# Patient Record
Sex: MALE | Race: WHITE | Employment: OTHER | URBAN - METROPOLITAN AREA
[De-identification: names, ages, dates, MRNs, and addresses within clinical notes are randomized per-mention and may not be internally consistent; named-entity substitution may affect disease eponyms.]

---

## 2017-04-11 ENCOUNTER — HOSPITAL ENCOUNTER (OUTPATIENT)
Dept: NON INVASIVE DIAGNOSTICS | Age: 82
Discharge: HOME | End: 2017-04-11
Attending: INTERNAL MEDICINE
Payer: MEDICARE

## 2017-04-11 DIAGNOSIS — R01.1 HEART MURMUR, SYSTOLIC: ICD-10-CM

## 2017-04-11 PROCEDURE — 93306 TTE W/DOPPLER COMPLETE: CPT

## 2017-04-12 NOTE — PROGRESS NOTES
Echo report reviewed and shows moderate Aortic stenosis which is the cause of his murmur  It is not bad enough to require replacement at this time but I recommend cardiology consult to follow him closely.  Please inform the patient

## 2017-04-21 PROBLEM — I35.0 AORTIC STENOSIS, MODERATE: Status: ACTIVE | Noted: 2017-04-21

## 2017-04-21 PROBLEM — Z87.39 HISTORY OF GOUT: Status: ACTIVE | Noted: 2017-04-21

## 2017-07-28 PROBLEM — Z87.39 HISTORY OF GOUT: Status: RESOLVED | Noted: 2017-04-21 | Resolved: 2017-07-28

## 2018-02-08 ENCOUNTER — HOSPITAL ENCOUNTER (OUTPATIENT)
Dept: PHYSICAL THERAPY | Age: 83
Discharge: HOME | End: 2018-02-08
Attending: INTERNAL MEDICINE
Payer: MEDICARE

## 2018-02-08 NOTE — PROGRESS NOTES
Nelly Lennox  : 1932  Primary: [employer] Medicare o  Secondary:  [address]  [facility] [address], [place] [address]  [phone]   [phone]        OUTPATIENT DAILY NOTE    NAME/AGE/GENDER: Nelly Lennox is a 80 y.o. male. DATE: 2018    Patient canceled physical therapy initial evaluation today.     Alayna Nguyễn, PT

## 2018-02-12 ENCOUNTER — HOSPITAL ENCOUNTER (OUTPATIENT)
Dept: PHYSICAL THERAPY | Age: 83
Discharge: HOME | End: 2018-02-12
Attending: INTERNAL MEDICINE
Payer: MEDICARE

## 2018-02-12 PROCEDURE — 97161 PT EVAL LOW COMPLEX 20 MIN: CPT

## 2018-02-12 PROCEDURE — G8979 MOBILITY GOAL STATUS: HCPCS

## 2018-02-12 PROCEDURE — G8978 MOBILITY CURRENT STATUS: HCPCS

## 2018-02-12 PROCEDURE — 97110 THERAPEUTIC EXERCISES: CPT

## 2018-02-12 NOTE — THERAPY EVALUATION
Mary Kwok  : 1932 [facility]  [address], [address], [place] [address]  [phone]   [phone]        OUTPATIENT PHYSICAL THERAPY:Initial Assessment 2018   ICD-10: Treatment Diagnosis: Low back pain (M54.5)  Precautions/Allergies:   Neurontin [gabapentin]   Fall Risk Score: 1 (? 5 = High Risk)  MD Orders: evaluate and treat MEDICAL/REFERRING DIAGNOSIS:  Chronic left-sided low back pain without sciatica [M54.5, G89.29]   DATE OF ONSET: --  REFERRING PHYSICIAN: Sheridan David MD  RETURN PHYSICIAN APPOINTMENT: --     INITIAL ASSESSMENT:  Mr. Joie Denise presents with left side low back pain with strength, ROM and postural abnormalities. Pt will benefit from skilled physical therapy to address dysfunctions and pain. PROBLEM LIST (Impacting functional limitations):  1. Decreased ADL/Functional Activities  2. Increased Pain  3. Decreased [place] with Home Exercise Program INTERVENTIONS PLANNED:  1. Home Exercise Program (HEP)  2. Manual Therapy including joint and soft tissue manipulation and mobilization, and dry needling  3. Therapeutic Exercise/Strengthening  4. Modalities including heat/cold application and electrical stimulation   TREATMENT PLAN:  Effective Dates: 18 TO 18. Frequency/Duration: 2 times a week for 4 weeks  GOALS: (Goals have been discussed and agreed upon with patient.)  Short-Term Functional Goals: Time Frame: 4 weeks  1. Pt will be independent with > or = 2 exercises in HEP. 2. Pt will report < or = 4/10 pain getting into and out of car. Discharge Goals: Time Frame: 8 weeks  1. Pt will be independent with > or = 4 exercises in HEP. 2. Pt will report < or = 2/10 pain getting into and out of car. 3. Pt will report < or = 10 on Oswestry.    Rehabilitation Potential For Stated Goals: Good  Regarding Daina Osler Wheless's therapy, I certify that the treatment plan above will be carried out by a therapist or under their direction. Thank you for this referral,  Joselyn Echols, PT, DPT                 The information in this section was collected on 2/12/18 (except where otherwise noted). 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 The back pain began Nov/Dec 2017. MD gave him a mm relaxor which helped some. Pain is worse getting in/out of car, sitting in car. Past Medical History/Comorbidities:   Mr. Dionicio Ma  has a past medical history of Cancer of prostate ([facility] [address] (2001); CKD (chronic kidney disease) stage 3, GFR 30-59 ml/min (8/18/2012); Gout (8/18/2012); Metabolic syndrome (1/09/5724); Mixed hyperlipidemia (8/18/2012); Moderate aortic valve stenosis; Peptic ulcer disease (8/18/2012); Unspecified essential hypertension (8/18/2012); and Urinary retention. He also has no past medical history of Difficult intubation; Malignant hyperthermia due to anesthesia; Nausea & vomiting; Pseudocholinesterase deficiency; or Unspecified adverse effect of anesthesia. Mr. Dionicio Ma  has a past surgical history that includes hx appendectomy (1950); hx cholecystectomy; hx gastrectomy; hx colonoscopy (2002); and hx turp (2000, 2015). Social History/Living Environment:     house  Prior Level of Function/Work/Activity:  Works  for dental lab 3 days per week  Dominant Side:         RIGHT  Current Medications:       Current Outpatient Prescriptions:     tiZANidine (ZANAFLEX) 4 mg tablet, Take 1 Tab by mouth three (3) times daily as needed. , Disp: 30 Tab, Rfl: 1    methylPREDNISolone (MEDROL DOSEPACK) 4 mg tablet, As directed, Disp: 1 Dose Pack, Rfl: 0    tamsulosin (FLOMAX) 0.4 mg capsule, Take 1 Cap by mouth daily (after dinner). , Disp: 90 Cap, Rfl: 1    benazepril (LOTENSIN) 40 mg tablet, Take 1 Tab by mouth daily. , Disp: 90 Tab, Rfl: 1    amLODIPine (NORVASC) 5 mg tablet, Take 1 Tab by mouth daily. , Disp: 90 Tab, Rfl: 1    allopurinol (ZYLOPRIM) 300 mg tablet, Take 1 Tab by mouth daily. , Disp: 90 Tab, Rfl: 1    SORE THROAT, BENZOCAINE-MENTH, 15-2.6 mg lozg lozenge, , Disp: , Rfl:     acetaminophen (TYLENOL EXTRA STRENGTH) 500 mg tablet, Take 1,000 mg by mouth nightly., Disp: , Rfl:     red yeast rice extract 600 mg cap, Take 600 mg by mouth daily. , Disp: , Rfl:    Date Last Reviewed:  2/12/2018     Number of Personal Factors/Comorbidities that affect the Plan of Care: 1-2: MODERATE COMPLEXITY   EXAMINATION:   Observation/Orthostatic Postural Assessment:          In standing forward trunk lean at the hips, increased hip flexion   Palpation:          Increased tenderness to PA glides to mid-lumbar segments and associated left low back pain  ROM:          See below  Strength:          Hip strength grossly 4/5 bilaterally        Decreased core strength  Balance:          No functional deficits noted  SFMA Top Tier (FN = Functional and Non-painful, FP = Functional and Painful, DP = Dysfunctional and Painful, DN = Dysfunctional and Non-painful)  Cervical flexion: FN  Cervical extension: DN   Cervical rotation: L FN, R DN  Upper Extremity Pattern 1 (extension, IR): L FN, R DN  Upper Extremity Pattern 2 (flexion, ER): L FN, R DN  Multi-segmental flexion: DN  Multi-segmental extension: DP  Multi-segmental rotation: L DP, R DP  Single-Leg Stance: L --, R --  Overhead Deep Squat: --   Summary: trunk extension and right rotation resulted in the most pain increases     Body Structures Involved:  1. Nerves  2. Joints  3. Muscles Body Functions Affected:  1. Sensory/Pain  2. Neuromusculoskeletal  3. Movement Related Activities and Participation Affected:  1. General Tasks and Demands  2. Mobility  3.  Community, Social and [place]   Number of elements (examined above) that affect the Plan of Care: 1-2: LOW COMPLEXITY   CLINICAL PRESENTATION:   Presentation: Stable and uncomplicated: LOW COMPLEXITY   CLINICAL DECISION MAKING:   Outcome Measure: Tool Used: Modified Oswestry Low Back Pain Questionnaire  Score:  Initial: 12/50  Most Recent: X/50 (Date: -- )   Interpretation of Score: Each section is scored on a 0-5 scale, 5 representing the greatest disability. The scores of each section are added together for a total score of 50. Score 0 1-10 11-20 21-30 31-40 41-49 50   Modifier CH CI CJ CK CL CM CN     ? Mobility - Walking and Moving Around:     - CURRENT STATUS: CJ - 20%-39% impaired, limited or restricted    - GOAL STATUS: CI - 1%-19% impaired, limited or restricted    - D/C STATUS:  ---------------To be determined---------------    Medical Necessity:   · Skilled intervention continues to be required due to decreased function. Reason for Services/Other Comments:  · Patient continues to require skilled intervention due to decreased function. Use of outcome tool(s) and clinical judgement create a POC that gives a: Clear prediction of patient's progress: LOW COMPLEXITY            TREATMENT:   (In addition to Assessment/Re-Assessment sessions the following treatments were rendered)  Pre-treatment Symptoms/Complaints:  3/10 pain  Pain: Initial:     3/10 Post Session:  1/10     THERAPEUTIC EXERCISE: (10 minutes):  Exercises per grid below to improve mobility and strength. Date:  2/12 Date:   Date:     Activity/Exercise Parameters Parameters Parameters         Prone press up X 10     Standing lumbar extension over fulcrum X 10                                 MANUAL THERAPY: ( 5 minutes): To increase motion and reduce pain  - cross friction massage over L lumbar paraspinal    MODALITIES: (10 minutes): for pain and tone reduction  Moist heat to low back    Treatment/Session Assessment:    · Response to Treatment:  Pt reported decreased pain after manual therapy plus extension exercises, increased pain during flexion activities.   · Compliance with Program/Exercises: Will assess as treatment progresses. · Recommendations/Intent for next treatment session: \"Next visit will focus on advancements to more challenging activities\".   Total Treatment Duration:  PT Patient Time In/Time Out  Time In: 1100  Time Out: 1200    Future Appointments  Date Time Provider Nishant Fisher   2/16/2018 1:00 PM Sloan Ferrer PT, DPT [facility]   2/19/2018 7:30 AM Gale Basilio PT [facility]   2/23/2018 1:45 PM Sloan Ferrer PT, DPT [facility]   2/26/2018 7:30 AM Gale Basilio PT [facility]   3/2/2018 1:00 PM Sloan Ferrer PT, DPT [facility]   3/5/2018 7:30 AM Gale Basilio PT [facility]   3/9/2018 1:00 PM Sloan Ferrer PT, DPT [facility]   3/12/2018 7:30 AM Gale Basilio PT [facility]   3/16/2018 1:00 PM Sloan Ferrer PT, DPT [facility]   3/27/2018 8:15 AM Kane Marc MD [facility]       Sloan Ferrer PT, DPT

## 2018-02-12 NOTE — PROGRESS NOTES
Ambulatory/Rehab Services H2 Model Falls Risk Assessment    Risk Factor Pts. ·   Confusion/Disorientation/Impulsivity  []    4 ·   Symptomatic Depression  []   2 ·   Altered Elimination  []   1 ·   Dizziness/Vertigo  []   1 ·   Gender (Male)  [x]   1 ·   Any administered antiepileptics (anticonvulsants):  []   2 ·   Any administered benzodiazepines:  []   1 ·   Visual Impairment (specify):  []   1 ·   Portable Oxygen Use  []   1 ·   Orthostatic ? BP  []   1 ·   History of Recent Falls (within 3 mos.)  []   5     Ability to Rise from Chair (choose one) Pts. ·   Ability to rise in a single movement  []   0 ·   Pushes up, successful in one attempt  []   1 ·   Multiple attempts, but successful  []   3 ·   Unable to rise without assistance  []   4   Total: (5 or greater = High Risk) 1     Falls Prevention Plan:   []                Physical Limitations to Exercise (specify):   []                Mobility Assistance Device (type):   []                Exercise/Equipment Adaptation (specify):    ©2010 [facility] [address] Patent #[id number].  Federal Law prohibits the replication, distribution or use without written permission from [facility] [employer]

## 2018-02-19 ENCOUNTER — HOSPITAL ENCOUNTER (OUTPATIENT)
Dept: PHYSICAL THERAPY | Age: 83
End: 2018-02-19
Attending: INTERNAL MEDICINE
Payer: MEDICARE

## 2018-02-23 ENCOUNTER — APPOINTMENT (OUTPATIENT)
Dept: PHYSICAL THERAPY | Age: 83
End: 2018-02-23
Attending: INTERNAL MEDICINE
Payer: MEDICARE

## 2018-02-26 ENCOUNTER — APPOINTMENT (OUTPATIENT)
Dept: PHYSICAL THERAPY | Age: 83
End: 2018-02-26
Attending: INTERNAL MEDICINE
Payer: MEDICARE

## 2018-03-02 ENCOUNTER — APPOINTMENT (OUTPATIENT)
Dept: PHYSICAL THERAPY | Age: 83
End: 2018-03-02
Attending: INTERNAL MEDICINE

## 2018-03-05 ENCOUNTER — APPOINTMENT (OUTPATIENT)
Dept: PHYSICAL THERAPY | Age: 83
End: 2018-03-05
Attending: INTERNAL MEDICINE

## 2018-03-09 ENCOUNTER — APPOINTMENT (OUTPATIENT)
Dept: PHYSICAL THERAPY | Age: 83
End: 2018-03-09
Attending: INTERNAL MEDICINE

## 2018-03-12 ENCOUNTER — APPOINTMENT (OUTPATIENT)
Dept: PHYSICAL THERAPY | Age: 83
End: 2018-03-12
Attending: INTERNAL MEDICINE

## 2018-03-16 ENCOUNTER — APPOINTMENT (OUTPATIENT)
Dept: PHYSICAL THERAPY | Age: 83
End: 2018-03-16
Attending: INTERNAL MEDICINE

## 2018-11-07 ENCOUNTER — HOSPITAL ENCOUNTER (OUTPATIENT)
Dept: GENERAL RADIOLOGY | Age: 83
Discharge: HOME | End: 2018-11-07
Attending: INTERNAL MEDICINE
Payer: MEDICARE

## 2018-11-07 DIAGNOSIS — R13.19 OTHER DYSPHAGIA: ICD-10-CM

## 2018-11-07 PROCEDURE — 74247 XR UPPER GI W KUB AIR CONT: CPT

## 2018-11-07 PROCEDURE — 74011000250 HC RX REV CODE- 250

## 2018-11-07 PROCEDURE — 74011000255 HC RX REV CODE- 255

## 2018-11-07 RX ADMIN — BARIUM SULFATE 300 ML: 0.6 SUSPENSION ORAL at 14:06

## 2018-11-07 RX ADMIN — BARIUM SULFATE 135 ML: 980 POWDER, FOR SUSPENSION ORAL at 14:05

## 2018-11-07 RX ADMIN — ANTACID/ANTIFLATULENT 4 G: 380; 550; 10; 10 GRANULE, EFFERVESCENT ORAL at 14:06

## 2018-11-07 NOTE — PROGRESS NOTES
Upper GI xray report reviewed and showed reflux and a small hiatal hernia, but his swallowing was normal without any cause for his symptoms seen.   Please inform the patient

## 2019-02-18 PROBLEM — J06.9 URI, ACUTE: Status: ACTIVE | Noted: 2019-02-18

## 2019-11-27 PROBLEM — J06.9 URI, ACUTE: Status: RESOLVED | Noted: 2019-02-18 | Resolved: 2019-11-27

## 2020-01-31 ENCOUNTER — HOSPITAL ENCOUNTER (INPATIENT)
Age: 85
LOS: 1 days | Discharge: HOME HEALTH | DRG: 065 | End: 2020-02-02
Attending: EMERGENCY MEDICINE | Admitting: INTERNAL MEDICINE
Payer: MEDICARE

## 2020-01-31 ENCOUNTER — APPOINTMENT (OUTPATIENT)
Dept: CT IMAGING | Age: 85
DRG: 065 | End: 2020-01-31
Attending: EMERGENCY MEDICINE
Payer: MEDICARE

## 2020-01-31 DIAGNOSIS — E78.2 MIXED HYPERLIPIDEMIA: ICD-10-CM

## 2020-01-31 DIAGNOSIS — R42 DIZZINESS: ICD-10-CM

## 2020-01-31 DIAGNOSIS — R27.0 ATAXIA: ICD-10-CM

## 2020-01-31 DIAGNOSIS — R47.1 DYSARTHRIA: Primary | ICD-10-CM

## 2020-01-31 DIAGNOSIS — I35.0 AORTIC STENOSIS, MODERATE: ICD-10-CM

## 2020-01-31 DIAGNOSIS — I10 ESSENTIAL HYPERTENSION: ICD-10-CM

## 2020-01-31 DIAGNOSIS — I63.9 ACUTE CVA (CEREBROVASCULAR ACCIDENT) (HCC): ICD-10-CM

## 2020-01-31 PROBLEM — E87.5 HYPERKALEMIA: Status: ACTIVE | Noted: 2020-01-31

## 2020-01-31 PROBLEM — N17.9 AKI (ACUTE KIDNEY INJURY) (HCC): Status: ACTIVE | Noted: 2020-01-31

## 2020-01-31 PROBLEM — R20.2 LEFT LEG PARESTHESIAS: Status: ACTIVE | Noted: 2020-01-31

## 2020-01-31 LAB
ALBUMIN SERPL-MCNC: 3.7 G/DL (ref 3.2–4.6)
ALBUMIN/GLOB SERPL: 1.1 {RATIO} (ref 1.2–3.5)
ALP SERPL-CCNC: 109 U/L (ref 50–136)
ALT SERPL-CCNC: 26 U/L (ref 12–65)
ANION GAP SERPL CALC-SCNC: 8 MMOL/L (ref 7–16)
AST SERPL-CCNC: 30 U/L (ref 15–37)
BASOPHILS # BLD: 0.1 K/UL (ref 0–0.2)
BASOPHILS NFR BLD: 1 % (ref 0–2)
BILIRUB SERPL-MCNC: 0.5 MG/DL (ref 0.2–1.1)
BUN SERPL-MCNC: 33 MG/DL (ref 8–23)
CALCIUM SERPL-MCNC: 9.3 MG/DL (ref 8.3–10.4)
CHLORIDE SERPL-SCNC: 110 MMOL/L (ref 98–107)
CO2 SERPL-SCNC: 20 MMOL/L (ref 21–32)
CREAT SERPL-MCNC: 1.71 MG/DL (ref 0.8–1.5)
DIFFERENTIAL METHOD BLD: ABNORMAL
EOSINOPHIL # BLD: 0.6 K/UL (ref 0–0.8)
EOSINOPHIL NFR BLD: 6 % (ref 0.5–7.8)
ERYTHROCYTE [DISTWIDTH] IN BLOOD BY AUTOMATED COUNT: 13.9 % (ref 11.9–14.6)
GLOBULIN SER CALC-MCNC: 3.5 G/DL (ref 2.3–3.5)
GLUCOSE SERPL-MCNC: 90 MG/DL (ref 65–100)
HCT VFR BLD AUTO: 42.7 % (ref 41.1–50.3)
HGB BLD-MCNC: 13.6 G/DL (ref 13.6–17.2)
IMM GRANULOCYTES # BLD AUTO: 0.1 K/UL (ref 0–0.5)
IMM GRANULOCYTES NFR BLD AUTO: 1 % (ref 0–5)
LYMPHOCYTES # BLD: 1.9 K/UL (ref 0.5–4.6)
LYMPHOCYTES NFR BLD: 22 % (ref 13–44)
MCH RBC QN AUTO: 33.5 PG (ref 26.1–32.9)
MCHC RBC AUTO-ENTMCNC: 31.9 G/DL (ref 31.4–35)
MCV RBC AUTO: 105.2 FL (ref 79.6–97.8)
MONOCYTES # BLD: 0.8 K/UL (ref 0.1–1.3)
MONOCYTES NFR BLD: 9 % (ref 4–12)
NEUTS SEG # BLD: 5.5 K/UL (ref 1.7–8.2)
NEUTS SEG NFR BLD: 61 % (ref 43–78)
NRBC # BLD: 0 K/UL (ref 0–0.2)
PLATELET # BLD AUTO: 158 K/UL (ref 150–450)
PMV BLD AUTO: 12 FL (ref 9.4–12.3)
POTASSIUM SERPL-SCNC: 5.9 MMOL/L (ref 3.5–5.1)
PROT SERPL-MCNC: 7.2 G/DL (ref 6.3–8.2)
RBC # BLD AUTO: 4.06 M/UL (ref 4.23–5.6)
SODIUM SERPL-SCNC: 138 MMOL/L (ref 136–145)
TROPONIN I SERPL-MCNC: <0.02 NG/ML (ref 0.02–0.05)
WBC # BLD AUTO: 8.9 K/UL (ref 4.3–11.1)

## 2020-01-31 PROCEDURE — 74011250637 HC RX REV CODE- 250/637: Performed by: FAMILY MEDICINE

## 2020-01-31 PROCEDURE — 85025 COMPLETE CBC W/AUTO DIFF WBC: CPT

## 2020-01-31 PROCEDURE — 99285 EMERGENCY DEPT VISIT HI MDM: CPT

## 2020-01-31 PROCEDURE — 96372 THER/PROPH/DIAG INJ SC/IM: CPT

## 2020-01-31 PROCEDURE — 70450 CT HEAD/BRAIN W/O DYE: CPT

## 2020-01-31 PROCEDURE — 74011250636 HC RX REV CODE- 250/636: Performed by: FAMILY MEDICINE

## 2020-01-31 PROCEDURE — 84484 ASSAY OF TROPONIN QUANT: CPT

## 2020-01-31 PROCEDURE — 93005 ELECTROCARDIOGRAM TRACING: CPT | Performed by: EMERGENCY MEDICINE

## 2020-01-31 PROCEDURE — 99218 HC RM OBSERVATION: CPT

## 2020-01-31 PROCEDURE — 80053 COMPREHEN METABOLIC PANEL: CPT

## 2020-01-31 RX ORDER — FINASTERIDE 5 MG/1
5 TABLET, FILM COATED ORAL DAILY
Status: DISCONTINUED | OUTPATIENT
Start: 2020-02-01 | End: 2020-02-02 | Stop reason: HOSPADM

## 2020-01-31 RX ORDER — PANTOPRAZOLE SODIUM 40 MG/1
40 TABLET, DELAYED RELEASE ORAL DAILY
Status: DISCONTINUED | OUTPATIENT
Start: 2020-02-01 | End: 2020-02-02 | Stop reason: HOSPADM

## 2020-01-31 RX ORDER — AMLODIPINE BESYLATE 5 MG/1
5 TABLET ORAL DAILY
Status: DISCONTINUED | OUTPATIENT
Start: 2020-02-01 | End: 2020-02-02 | Stop reason: HOSPADM

## 2020-01-31 RX ORDER — SODIUM CHLORIDE 0.9 % (FLUSH) 0.9 %
5-40 SYRINGE (ML) INJECTION EVERY 8 HOURS
Status: DISCONTINUED | OUTPATIENT
Start: 2020-01-31 | End: 2020-02-02 | Stop reason: HOSPADM

## 2020-01-31 RX ORDER — SODIUM CHLORIDE 9 MG/ML
100 INJECTION, SOLUTION INTRAVENOUS CONTINUOUS
Status: DISPENSED | OUTPATIENT
Start: 2020-01-31 | End: 2020-02-01

## 2020-01-31 RX ORDER — LISINOPRIL 20 MG/1
40 TABLET ORAL DAILY
Status: DISCONTINUED | OUTPATIENT
Start: 2020-02-01 | End: 2020-02-02 | Stop reason: HOSPADM

## 2020-01-31 RX ORDER — FLUTICASONE PROPIONATE 50 MCG
2 SPRAY, SUSPENSION (ML) NASAL DAILY
Status: DISCONTINUED | OUTPATIENT
Start: 2020-02-01 | End: 2020-02-02 | Stop reason: HOSPADM

## 2020-01-31 RX ORDER — ASPIRIN 325 MG
325 TABLET ORAL DAILY
Status: DISCONTINUED | OUTPATIENT
Start: 2020-02-01 | End: 2020-02-02 | Stop reason: HOSPADM

## 2020-01-31 RX ORDER — HEPARIN SODIUM 5000 [USP'U]/ML
5000 INJECTION, SOLUTION INTRAVENOUS; SUBCUTANEOUS EVERY 8 HOURS
Status: DISCONTINUED | OUTPATIENT
Start: 2020-01-31 | End: 2020-02-02 | Stop reason: HOSPADM

## 2020-01-31 RX ORDER — SODIUM CHLORIDE 0.9 % (FLUSH) 0.9 %
5-40 SYRINGE (ML) INJECTION AS NEEDED
Status: DISCONTINUED | OUTPATIENT
Start: 2020-01-31 | End: 2020-02-02 | Stop reason: HOSPADM

## 2020-01-31 RX ORDER — ATORVASTATIN CALCIUM 40 MG/1
40 TABLET, FILM COATED ORAL
Status: DISCONTINUED | OUTPATIENT
Start: 2020-01-31 | End: 2020-02-02 | Stop reason: HOSPADM

## 2020-01-31 RX ORDER — ALLOPURINOL 300 MG/1
150 TABLET ORAL DAILY
Status: DISCONTINUED | OUTPATIENT
Start: 2020-02-01 | End: 2020-02-02 | Stop reason: HOSPADM

## 2020-01-31 RX ORDER — TAMSULOSIN HYDROCHLORIDE 0.4 MG/1
0.4 CAPSULE ORAL
Status: DISCONTINUED | OUTPATIENT
Start: 2020-02-01 | End: 2020-02-02 | Stop reason: HOSPADM

## 2020-01-31 RX ORDER — ALLOPURINOL 300 MG/1
300 TABLET ORAL DAILY
Status: DISCONTINUED | OUTPATIENT
Start: 2020-02-01 | End: 2020-01-31 | Stop reason: DRUGHIGH

## 2020-01-31 RX ADMIN — ATORVASTATIN CALCIUM 40 MG: 40 TABLET, FILM COATED ORAL at 22:32

## 2020-01-31 RX ADMIN — HEPARIN SODIUM 5000 UNITS: 5000 INJECTION INTRAVENOUS; SUBCUTANEOUS at 22:34

## 2020-01-31 RX ADMIN — Medication 10 ML: at 22:41

## 2020-01-31 RX ADMIN — SODIUM CHLORIDE 100 ML/HR: 900 INJECTION, SOLUTION INTRAVENOUS at 22:40

## 2020-01-31 NOTE — ED TRIAGE NOTES
Ems called to home for dizziness. [place] normal at 0900 today but felt dizziness and off balance when he got out of bed. Describes as off balance not the room spinning. Speech is a little delayed but not slurred. bgl 90, bp 130/70, hr 50s SA. Denies headache, no balance issues per EMS with walking.

## 2020-01-31 NOTE — ED PROVIDER NOTES
This is an 55-year-old gentleman who awoke at 8 AM.  Did not notice any issues but did not get out of bed. When he got to bed at 9 AM he noticed himself feeling off balance and had to hold on 2 items for support and keep from falling. He did not have a fall. He still he felt kind of clumsy in his left arm and felt like his left leg was not working right. A neighbor earlier today noticed that his speech was off. He came in by EMS. He states that this point he feels fine when he is laying down. He feels like he would have some trouble walking and being off balance if he got up. He states he does not feel like he has any difficulty with his left arm or leg anymore. States his speech is almost completely better. No history of stroke or TIA. Has history hypertension prostate cancers. Remote history of peptic ulcer disease. Denies any cardiac issues. No vertigo or double vision trouble swallowing. The history is provided by the patient. Dizziness   This is a new problem. The current episode started 6 to 12 hours ago. The problem has been gradually improving. There was left upper extremity focality noted. Primary symptoms include focal weakness, loss of balance and speech difficulty. Pertinent negatives include no loss of sensation, no visual change and no mental status change. There has been no fever. Pertinent negatives include no shortness of breath, no chest pain, no vomiting, no altered mental status, no confusion, no headaches, no nausea and no bladder incontinence.         Past Medical History:   Diagnosis Date    Cancer of prostate ([facility] [address] 2001    radiation tx     CKD (chronic kidney disease) stage 3, GFR 30-59 ml/min ([facility]) 8/18/2012    Gout 3/95/0913    Metabolic syndrome 7/08/0561    Mixed hyperlipidemia 8/18/2012    Moderate aortic valve stenosis     Peptic ulcer disease 8/18/2012    Unspecified essential hypertension 8/18/2012    Urinary retention        Past Surgical History: Procedure Laterality Date    HX APPENDECTOMY  1950    HX CHOLECYSTECTOMY      HX COLONOSCOPY  2002    HX GASTRECTOMY      V&A, 1999    HX TURP  ,     Dx CA, followed by radiation Rx         Family History:   Problem Relation Age of Onset    Dementia Father        Social History     Socioeconomic History    Marital status:      Spouse name: Not on file    Number of children: Not on file    Years of education: Not on file    Highest education level: Not on file   Occupational History    Not on file   Social Needs    Financial resource strain: Not on file    Food insecurity:     Worry: Not on file     Inability: Not on file    Transportation needs:     Medical: Not on file     Non-medical: Not on file   Tobacco Use    Smoking status: Former Smoker     Packs/day: 1.00     Years: 10.00     Pack years: 10.00     Last attempt to quit: 1968     Years since quittin.0    Smokeless tobacco: Never Used    Tobacco comment: quit 60 years ago    Substance and Sexual Activity    Alcohol use: No    Drug use: No    Sexual activity: Not Currently   Lifestyle    Physical activity:     Days per week: Not on file     Minutes per session: Not on file    Stress: Not on file   Relationships    Social connections:     Talks on phone: Not on file     Gets together: Not on file     Attends Mandaen service: Not on file     Active member of club or organization: Not on file     Attends meetings of clubs or organizations: Not on file     Relationship status: Not on file    Intimate partner violence:     Fear of current or ex partner: Not on file     Emotionally abused: Not on file     Physically abused: Not on file     Forced sexual activity: Not on file   Other Topics Concern    Not on file   Social History Narrative    Lives alone, , drives, two daughters he is close to, Nahid Castañeda [phone], Temo Martino, close to both of them         ALLERGIES: Neurontin [gabapentin]    Review of Systems   Constitutional: Negative for chills and fever. Respiratory: Negative for cough and shortness of breath. Cardiovascular: Negative for chest pain and palpitations. Gastrointestinal: Negative for abdominal pain, diarrhea, nausea and vomiting. Genitourinary: Negative for bladder incontinence, dysuria and flank pain. Musculoskeletal: Negative for back pain and neck pain. Skin: Negative for color change and rash. Neurological: Positive for dizziness, focal weakness, speech difficulty, weakness, light-headedness and loss of balance. Negative for syncope, numbness and headaches. Psychiatric/Behavioral: Negative for confusion. All other systems reviewed and are negative. There were no vitals filed for this visit. Physical Exam  Vitals signs and nursing note reviewed. Constitutional:       General: He is not in acute distress. Appearance: He is well-developed. HENT:      Head: Normocephalic and atraumatic. Right Ear: External ear normal.      Left Ear: External ear normal.      Mouth/Throat:      Pharynx: No oropharyngeal exudate. Eyes:      Conjunctiva/sclera: Conjunctivae normal.      Pupils: Pupils are equal, round, and reactive to light. Neck:      Musculoskeletal: Normal range of motion and neck supple. Cardiovascular:      Rate and Rhythm: Normal rate and regular rhythm. Heart sounds: No murmur. Pulmonary:      Effort: No respiratory distress. Breath sounds: Normal breath sounds. Abdominal:      General: Bowel sounds are normal.      Palpations: Abdomen is soft. There is no mass. Tenderness: There is no abdominal tenderness. There is no guarding or rebound. Hernia: No hernia is present. Skin:     General: Skin is warm and dry. Neurological:      Mental Status: He is alert and oriented to person, place, and time. GCS: GCS eye subscore is 4. GCS verbal subscore is 5. GCS motor subscore is 6. Motor: No pronator drift. Coordination: Finger-Nose-Finger Test normal.      Gait: Gait normal.      Comments: Nl speech   Psychiatric:         Speech: Speech normal.          MDM  Number of Diagnoses or Management Options  Diagnosis management comments: Concern for TIA or small stroke. Not TPA candidate due to time factors. Do not believe he would be a candidate for revascularization as his stroke scale is may be 1 at this point. Amount and/or Complexity of Data Reviewed  Clinical lab tests: ordered and reviewed  Tests in the radiology section of CPT®: ordered and reviewed  Tests in the medicine section of CPT®: ordered and reviewed  Independent visualization of images, tracings, or specimens: yes    Risk of Complications, Morbidity, and/or Mortality  Presenting problems: moderate  Diagnostic procedures: minimal  Management options: low    Patient Progress  Patient progress: stable         Procedures    Results Include:    Recent Results (from the past 24 hour(s))   CBC WITH AUTOMATED DIFF    Collection Time: 01/31/20  6:41 PM   Result Value Ref Range    WBC 8.9 4.3 - 11.1 K/uL    RBC 4.06 (L) 4.23 - 5.6 M/uL    HGB 13.6 13.6 - 17.2 g/dL    HCT 42.7 41.1 - 50.3 %    .2 (H) 79.6 - 97.8 FL    MCH 33.5 (H) 26.1 - 32.9 PG    MCHC 31.9 31.4 - 35.0 g/dL    RDW 13.9 11.9 - 14.6 %    PLATELET 057 699 - 210 K/uL    MPV 12.0 9.4 - 12.3 FL    ABSOLUTE NRBC 0.00 0.0 - 0.2 K/uL    DF AUTOMATED      NEUTROPHILS 61 43 - 78 %    LYMPHOCYTES 22 13 - 44 %    MONOCYTES 9 4.0 - 12.0 %    EOSINOPHILS 6 0.5 - 7.8 %    BASOPHILS 1 0.0 - 2.0 %    IMMATURE GRANULOCYTES 1 0.0 - 5.0 %    ABS. NEUTROPHILS 5.5 1.7 - 8.2 K/UL    ABS. LYMPHOCYTES 1.9 0.5 - 4.6 K/UL    ABS. MONOCYTES 0.8 0.1 - 1.3 K/UL    ABS. EOSINOPHILS 0.6 0.0 - 0.8 K/UL    ABS. BASOPHILS 0.1 0.0 - 0.2 K/UL    ABS. IMM.  GRANS. 0.1 0.0 - 0.5 K/UL   METABOLIC PANEL, COMPREHENSIVE    Collection Time: 01/31/20  6:41 PM   Result Value Ref Range    Sodium 138 136 - 145 mmol/L    Potassium 5.9 (H) 3.5 - 5.1 mmol/L    Chloride 110 (H) 98 - 107 mmol/L    CO2 20 (L) 21 - 32 mmol/L    Anion gap 8 7 - 16 mmol/L    Glucose 90 65 - 100 mg/dL    BUN 33 (H) 8 - 23 MG/DL    Creatinine 1.71 (H) 0.8 - 1.5 MG/DL    GFR est AA 49 (L) >60 ml/min/1.73m2    GFR est non-AA 40 (L) >60 ml/min/1.73m2    Calcium 9.3 8.3 - 10.4 MG/DL    Bilirubin, total 0.5 0.2 - 1.1 MG/DL    ALT (SGPT) 26 12 - 65 U/L    AST (SGOT) 30 15 - 37 U/L    Alk. phosphatase 109 50 - 136 U/L    Protein, total 7.2 6.3 - 8.2 g/dL    Albumin 3.7 3.2 - 4.6 g/dL    Globulin 3.5 2.3 - 3.5 g/dL    A-G Ratio 1.1 (L) 1.2 - 3.5     TROPONIN I    Collection Time: 01/31/20  6:41 PM   Result Value Ref Range    Troponin-I, Qt. <0.02 (L) 0.02 - 0.05 NG/ML     Ct Head Wo Cont    Result Date: 1/31/2020  Exam: CT head without contrast. Indication: Ataxia. Comparison: None. Multiple axial images obtained through the brain without intravenous contrast. Radiation dose reduction techniques were used for this study: All CT scans performed at this facility use one or all of the following: Automated exposure control, adjustment of the mA and/or kVp according to patient's size, iterative reconstruction. FINDINGS: Diffuse cerebral atrophy with commensurate ex vacuo dilatation of the ventricles. Scattered periventricular and centrum semiovale white matter hypointensities most indicative of chronic ischemic white matter change. No evidence of intracranial mass, hemorrhage, or large territorial infarct. The basal cisterns are patent. No extra-axial fluid collection or mass effect. Bilateral lens replacements. The paranasal sinuses are clear. The mastoid air cells and middle ears are clear. No significant osseous or extracranial soft tissue lesions. IMPRESSION: 1. No evidence of acute intracranial abnormality. Chronic changes as above. Concern for TIA or small stroke. Will discuss with hospitalist regarding admission.

## 2020-02-01 ENCOUNTER — APPOINTMENT (OUTPATIENT)
Dept: ULTRASOUND IMAGING | Age: 85
DRG: 065 | End: 2020-02-01
Attending: INTERNAL MEDICINE
Payer: MEDICARE

## 2020-02-01 ENCOUNTER — APPOINTMENT (OUTPATIENT)
Dept: MRI IMAGING | Age: 85
DRG: 065 | End: 2020-02-01
Attending: FAMILY MEDICINE
Payer: MEDICARE

## 2020-02-01 PROBLEM — I63.9 ACUTE CVA (CEREBROVASCULAR ACCIDENT) (HCC): Status: ACTIVE | Noted: 2020-02-01

## 2020-02-01 LAB
ANION GAP SERPL CALC-SCNC: 5 MMOL/L (ref 7–16)
ATRIAL RATE: 55 BPM
BUN SERPL-MCNC: 29 MG/DL (ref 8–23)
CALCIUM SERPL-MCNC: 9.6 MG/DL (ref 8.3–10.4)
CALCULATED P AXIS, ECG09: 40 DEGREES
CALCULATED R AXIS, ECG10: -61 DEGREES
CALCULATED T AXIS, ECG11: -21 DEGREES
CHLORIDE SERPL-SCNC: 114 MMOL/L (ref 98–107)
CHOLEST SERPL-MCNC: 131 MG/DL
CO2 SERPL-SCNC: 23 MMOL/L (ref 21–32)
CREAT SERPL-MCNC: 1.61 MG/DL (ref 0.8–1.5)
CRP SERPL HS-MCNC: 0.6 MG/L
CRP SERPL-MCNC: <0.3 MG/DL (ref 0–0.9)
DIAGNOSIS, 93000: NORMAL
ERYTHROCYTE [DISTWIDTH] IN BLOOD BY AUTOMATED COUNT: 13.8 % (ref 11.9–14.6)
ERYTHROCYTE [SEDIMENTATION RATE] IN BLOOD: 6 MM/HR (ref 0–20)
EST. AVERAGE GLUCOSE BLD GHB EST-MCNC: 105 MG/DL
GLUCOSE SERPL-MCNC: 82 MG/DL (ref 65–100)
HBA1C MFR BLD: 5.3 % (ref 4.8–6)
HCT VFR BLD AUTO: 38.8 % (ref 41.1–50.3)
HDLC SERPL-MCNC: 45 MG/DL (ref 40–60)
HDLC SERPL: 2.9 {RATIO}
HGB BLD-MCNC: 12.3 G/DL (ref 13.6–17.2)
LDLC SERPL CALC-MCNC: 62.2 MG/DL
LIPID PROFILE,FLP: ABNORMAL
MCH RBC QN AUTO: 33 PG (ref 26.1–32.9)
MCHC RBC AUTO-ENTMCNC: 31.7 G/DL (ref 31.4–35)
MCV RBC AUTO: 104 FL (ref 79.6–97.8)
NRBC # BLD: 0 K/UL (ref 0–0.2)
P-R INTERVAL, ECG05: 170 MS
PLATELET # BLD AUTO: 133 K/UL (ref 150–450)
PMV BLD AUTO: 11.6 FL (ref 9.4–12.3)
POTASSIUM SERPL-SCNC: 5.1 MMOL/L (ref 3.5–5.1)
Q-T INTERVAL, ECG07: 420 MS
QRS DURATION, ECG06: 108 MS
QTC CALCULATION (BEZET), ECG08: 401 MS
RBC # BLD AUTO: 3.73 M/UL (ref 4.23–5.6)
SODIUM SERPL-SCNC: 142 MMOL/L (ref 136–145)
TRIGL SERPL-MCNC: 119 MG/DL (ref 35–150)
VENTRICULAR RATE, ECG03: 55 BPM
VLDLC SERPL CALC-MCNC: 23.8 MG/DL (ref 6–23)
WBC # BLD AUTO: 7.3 K/UL (ref 4.3–11.1)

## 2020-02-01 PROCEDURE — 70551 MRI BRAIN STEM W/O DYE: CPT

## 2020-02-01 PROCEDURE — 86140 C-REACTIVE PROTEIN: CPT

## 2020-02-01 PROCEDURE — 93306 TTE W/DOPPLER COMPLETE: CPT

## 2020-02-01 PROCEDURE — 85027 COMPLETE CBC AUTOMATED: CPT

## 2020-02-01 PROCEDURE — 74011250637 HC RX REV CODE- 250/637: Performed by: INTERNAL MEDICINE

## 2020-02-01 PROCEDURE — 65660000000 HC RM CCU STEPDOWN

## 2020-02-01 PROCEDURE — 92610 EVALUATE SWALLOWING FUNCTION: CPT

## 2020-02-01 PROCEDURE — 93880 EXTRACRANIAL BILAT STUDY: CPT

## 2020-02-01 PROCEDURE — 80061 LIPID PANEL: CPT

## 2020-02-01 PROCEDURE — 99222 1ST HOSP IP/OBS MODERATE 55: CPT | Performed by: NURSE PRACTITIONER

## 2020-02-01 PROCEDURE — 97161 PT EVAL LOW COMPLEX 20 MIN: CPT

## 2020-02-01 PROCEDURE — 97116 GAIT TRAINING THERAPY: CPT

## 2020-02-01 PROCEDURE — 74011250637 HC RX REV CODE- 250/637: Performed by: FAMILY MEDICINE

## 2020-02-01 PROCEDURE — 86141 C-REACTIVE PROTEIN HS: CPT

## 2020-02-01 PROCEDURE — 85652 RBC SED RATE AUTOMATED: CPT

## 2020-02-01 PROCEDURE — 74011250636 HC RX REV CODE- 250/636: Performed by: FAMILY MEDICINE

## 2020-02-01 PROCEDURE — 36415 COLL VENOUS BLD VENIPUNCTURE: CPT

## 2020-02-01 PROCEDURE — 97165 OT EVAL LOW COMPLEX 30 MIN: CPT

## 2020-02-01 PROCEDURE — 83036 HEMOGLOBIN GLYCOSYLATED A1C: CPT

## 2020-02-01 PROCEDURE — 99218 HC RM OBSERVATION: CPT

## 2020-02-01 PROCEDURE — 80048 BASIC METABOLIC PNL TOTAL CA: CPT

## 2020-02-01 RX ADMIN — HEPARIN SODIUM 5000 UNITS: 5000 INJECTION INTRAVENOUS; SUBCUTANEOUS at 14:31

## 2020-02-01 RX ADMIN — AMLODIPINE BESYLATE 5 MG: 5 TABLET ORAL at 09:06

## 2020-02-01 RX ADMIN — ASPIRIN 325 MG ORAL TABLET 325 MG: 325 PILL ORAL at 09:06

## 2020-02-01 RX ADMIN — Medication 1 EACH: at 22:29

## 2020-02-01 RX ADMIN — Medication 10 ML: at 14:32

## 2020-02-01 RX ADMIN — Medication 5 ML: at 06:25

## 2020-02-01 RX ADMIN — ALLOPURINOL 150 MG: 300 TABLET ORAL at 09:06

## 2020-02-01 RX ADMIN — ATORVASTATIN CALCIUM 40 MG: 40 TABLET, FILM COATED ORAL at 20:58

## 2020-02-01 RX ADMIN — PANTOPRAZOLE SODIUM 40 MG: 40 TABLET, DELAYED RELEASE ORAL at 09:06

## 2020-02-01 RX ADMIN — HEPARIN SODIUM 5000 UNITS: 5000 INJECTION INTRAVENOUS; SUBCUTANEOUS at 06:23

## 2020-02-01 RX ADMIN — HEPARIN SODIUM 5000 UNITS: 5000 INJECTION INTRAVENOUS; SUBCUTANEOUS at 20:58

## 2020-02-01 RX ADMIN — Medication 10 ML: at 21:00

## 2020-02-01 RX ADMIN — FINASTERIDE 5 MG: 5 TABLET, FILM COATED ORAL at 09:06

## 2020-02-01 RX ADMIN — FLUTICASONE PROPIONATE 2 SPRAY: 50 SPRAY, METERED NASAL at 09:10

## 2020-02-01 RX ADMIN — LISINOPRIL 40 MG: 20 TABLET ORAL at 09:06

## 2020-02-01 RX ADMIN — TAMSULOSIN HYDROCHLORIDE 0.4 MG: 0.4 CAPSULE ORAL at 18:01

## 2020-02-01 NOTE — PROGRESS NOTES
01/31/20 2126   NIH Stroke Scale   Interval Other (comment)  (Dual admission [facility]. )   LOC 0   LOC Questions 0   LOC Commands 0   Best Gaze 0   Visual 0   Facial Palsy 0   Motor Right Arm 0   Motor Left Arm 0   Motor Right Leg 0   Motor Left Leg 0   Limb Ataxia 0   Sensory 0   Best Language 0   Dysarthria 0   Extinction and Inattention 0   Total 0     Dual admission [facility] with Perla Zelaya RN.

## 2020-02-01 NOTE — PROGRESS NOTES
Problem: Self Care Deficits Care Plan (Adult)  Goal: *Acute Goals and Plan of Care (Insert Text)  Description  1. Patient will complete total body bathing and dressing with modified independence and adaptive equipment as needed. 2. Patient will complete toileting with independence. 3. Patient will tolerate 30 minutes of OT treatment with 2-3 rest breaks to increase activity tolerance for ADLs. 4. Patient will complete functional transfers with modified independence and adaptive equipment as needed. 5. Patient will complete functional mobility for household distances with modified independence and good safety awareness. Timeframe: 7 visits      Outcome: Progressing Towards Goal     OCCUPATIONAL THERAPY: Initial Assessment and AM 2/1/2020  INPATIENT:    Payor: Isaac Gordon / Plan: [address] [place] [address] HMO / Product Type: Managed Care Medicare /      NAME/AGE/GENDER: Janessa Gallegos is a 80 y.o. male   PRIMARY DIAGNOSIS:  CVA (cerebral vascular accident) ([facility] [address] [I63.9]  Acute CVA (cerebrovascular accident) ([address] [I63.9] Acute CVA (cerebrovascular accident) ([facility] [address] Acute CVA (cerebrovascular accident) ([address]        ICD-10: Treatment Diagnosis:    Generalized Muscle Weakness (M62.81)  Other lack of cordination (R27.8)  Dizziness and Giddiness (R42)   Precautions/Allergies:     Neurontin [gabapentin]      ASSESSMENT:     Mr. Michael Shah presents to the hospital with acute CVA. Pt reports feeling dizzy/off-balanced with some L sided deficits. Pt was supine in the bed upon arrival. Pt is alert and appropriate for his age. Pt reports that he feels his speech has returned to normal but did have some difficulty with eating this am. Pt completed bed mobility with SBA. Pt sits at midline at the edge of the bed. Pt denies any visual deficits but does report an episode of blurry vision ~3 weeks ago. Pt was able to see therapist's badge and visual tracking/visual fields appear WFL.  Pt has intact sensation and strength in B UEs. Pt does appears to have some decreased coordination with finger to nose in the L UE initially. Pt is L handed. Pt stood with CGA and reports he felt unsteady. Pt provided with a walker in which he states helped him to feel more steady. Pt completed functional mobility with CGA/SBA out into the hallway to get on a stretcher to go down to MRI. Assessment was somewhat brief/limited due to patient going off the floor. Pt appears to be functioning slightly below baseline and will benefit from OT services to address stated goals and plan of care. This section established at most recent assessment   PROBLEM LIST (Impairments causing functional limitations):  Decreased ADL/Functional Activities  Decreased Transfer Abilities  Decreased Ambulation Ability/Technique  Decreased Balance  Decreased Activity Tolerance  Decreased [place] with Home Exercise Program   INTERVENTIONS PLANNED: (Benefits and precautions of occupational therapy have been discussed with the patient.)  Activities of daily living training  Adaptive equipment training  Balance training  Clothing management  Neuromuscular re-eduation  Therapeutic activity  Therapeutic exercise     TREATMENT PLAN: Frequency/Duration: Follow patient 3 times per week to address above goals. Rehabilitation Potential For Stated Goals: Excellent     REHAB RECOMMENDATIONS (at time of discharge pending progress):    Placement: It is my opinion, based on this patient's performance to date, that Mr. Mesfin Galvez may benefit from participating in 1-2 additional therapy sessions in order to continue to assess for rehab potential and then make recommendation for disposition at discharge.   Equipment:   TBD               OCCUPATIONAL PROFILE AND HISTORY:   History of Present Injury/Illness (Reason for Referral):  See H&P  Past Medical History/Comorbidities:   Mr. Mesfin Galvez  has a past medical history of Cancer of prostate ([facility] [address] (2001), CKD (chronic kidney disease) stage 3, GFR 30-59 ml/min ([facility] [address] (8/18/2012), Gout (7/42/4990), Metabolic syndrome (7/21/0904), Mixed hyperlipidemia (8/18/2012), Moderate aortic valve stenosis, Peptic ulcer disease (8/18/2012), Unspecified essential hypertension (8/18/2012), and Urinary retention. He also has no past medical history of Difficult intubation, Malignant hyperthermia due to anesthesia, Nausea & vomiting, Pseudocholinesterase deficiency, or Unspecified adverse effect of anesthesia. Mr. Randee Dhillon  has a past surgical history that includes hx appendectomy (1950); hx cholecystectomy; hx gastrectomy; hx colonoscopy (2002); and hx turp (2000, 2015). Social History/Living Environment:   Home Environment: Private residence  # Steps to Enter: 3  Rails to Enter: Yes  Hand Rails : Bilateral  One/Two Story Residence: One story  Living Alone: No  Support Systems: Child(baltazar)  Patient Expects to be Discharged to[de-identified] Private residence  Current DME Used/Available at Home: [employer], straight, Walker, rolling  Tub or Shower Type: Shower  Prior Level of Function/Work/Activity:  Pt lives with his daughter and is typically independent with ADL/functional mobility. Pt denies any falls. Pt still drives and was working up until Labor Day of last year. Personal Factors: Other factors that influence how disability is experienced by the patient:  multiple co-morbidities    Number of Personal Factors/Comorbidities that affect the Plan of Care: Expanded review of therapy/medical records (1-2):  MODERATE COMPLEXITY   ASSESSMENT OF OCCUPATIONAL PERFORMANCE[de-identified]   Activities of Daily Living:   Basic ADLs (From Assessment) Complex ADLs (From Assessment)   Feeding: Setup  Oral Facial Hygiene/Grooming: Setup  Bathing: Minimum assistance  Upper Body Dressing: Stand-by assistance  Lower Body Dressing: Minimum assistance  Toileting: Minimum assistance Instrumental ADL  Meal Preparation: Moderate assistance  Homemaking:  Moderate assistance   Grooming/Bathing/Dressing Activities of Daily Living     Cognitive Retraining  Safety/Judgement: Awareness of environment; Fall prevention                       Bed/Mat Mobility  Rolling: Stand-by assistance  Supine to Sit: Stand-by assistance  Sit to Supine: Contact guard assistance  Sit to Stand: Contact guard assistance  Stand to Sit: Contact guard assistance  Bed to Chair: Contact guard assistance  Scooting: Supervision     Most Recent Physical Functioning:   Gross Assessment:  AROM: Within functional limits  Strength:  Within functional limits  Coordination: Generally decreased, functional(slightly less coordinated on L side)  Tone: Normal  Sensation: Intact               Posture:     Balance:  Sitting: Intact  Standing: Impaired  Standing - Static: Fair  Standing - Dynamic : Fair Bed Mobility:  Rolling: Stand-by assistance  Supine to Sit: Stand-by assistance  Sit to Supine: Contact guard assistance  Scooting: Supervision  Wheelchair Mobility:     Transfers:  Sit to Stand: Contact guard assistance  Stand to Sit: Contact guard assistance  Bed to Chair: Contact guard assistance            Patient Vitals for the past 6 hrs:   BP BP Patient Position SpO2 Pulse   20 0800 121/70 At rest 92 % 64   20 1200 135/73 At rest 94 % 72       Mental Status  Neurologic State: Alert  Orientation Level: Oriented X4  Cognition: Appropriate for age attention/concentration, Follows commands  Perception: Appears intact  Perseveration: No perseveration noted  Safety/Judgement: Awareness of environment, Fall prevention                          Physical Skills Involved:  Balance  Activity Tolerance  Gross Motor Control Cognitive Skills Affected (resulting in the inability to perform in a timely and safe manner):  None  Psychosocial Skills Affected:  None    Number of elements that affect the Plan of Care: 1-3:  LOW COMPLEXITY   CLINICAL DECISION MAKIN [facility] [address] [id number] AM-PAC 6 Clicks   Daily Activity Inpatient Short Form  How much help from another person does the patient currently need. .. Total A Lot A Little None   1. Putting on and taking off regular lower body clothing? [] 1   [] 2   [x] 3   [] 4   2. Bathing (including washing, rinsing, drying)? [] 1   [] 2   [x] 3   [] 4   3. Toileting, which includes using toilet, bedpan or urinal?   [] 1   [] 2   [x] 3   [] 4   4. Putting on and taking off regular upper body clothing? [] 1   [] 2   [x] 3   [] 4   5. Taking care of personal grooming such as brushing teeth? [] 1   [] 2   [x] 3   [] 4   6. Eating meals? [] 1   [] 2   [x] 3   [] 4   © 2007, Trustees of [address] [place] [address] [address] [address], under license to [employer]. All rights reserved      Score:  Initial: 18 Most Recent: X (Date: -- )    Interpretation of Tool:  Represents activities that are increasingly more difficult (i.e. Bed mobility, Transfers, Gait). Medical Necessity:     Patient demonstrates   good   rehab potential due to higher previous functional level. Reason for Services/Other Comments:  Patient continues to require skilled intervention due to   Decreased balance and safety with ADL/functional transfers   . Use of outcome tool(s) and clinical judgement create a POC that gives a: LOW COMPLEXITY         TREATMENT:   (In addition to Assessment/Re-Assessment sessions the following treatments were rendered)     Pre-treatment Symptoms/Complaints:    Pain: Initial:   Pain Intensity 1: 0  Post Session:  0/10     Assessment/Reassessment only, no treatment provided today    Braces/Orthotics/Lines/Etc:   O2 Device: Room air  Treatment/Session Assessment:    Response to Treatment:  Eval only  Interdisciplinary Collaboration:   Occupational Therapist  Registered Nurse  After treatment position/precautions:   Stretcher with transport to MRI   Compliance with Program/Exercises: Will assess as treatment progresses. Recommendations/Intent for next treatment session:   \"Next visit will focus on advancements to more challenging activities and reduction in assistance provided\".   Total Treatment Duration:  OT Patient Time In/Time Out  Time In: 0915  Time Out: [address] [facility],[address], OT

## 2020-02-01 NOTE — PROGRESS NOTES
Hospitalist Progress Note    Patient: Megan Loving MRN: [id number]  SSN: [id number]    YOB: 1932  Age: 80 y.o. Sex: male      Admit Date: 1/31/2020    LOS: 0 days     Subjective:     80year old CM with as PMH of HLD admitted on 1/31/20 for CVA work up due to acute dizziness and left sided heaviness. 2/1 - Feels slightly better. Still with \"dizziness\" when stands up to walk. Left sided heaviness improving. Denies CP/SOB/palpitations. Review of systems negative except stated above. Objective:     Visit Vitals  /70 (BP 1 Location: Right arm, BP Patient Position: At rest)   Pulse 64   Temp 98 °F (36.7 °C)   Resp 19   SpO2 92%      Oxygen Therapy  O2 Sat (%): 92 % (02/01/20 0800)  Pulse via Oximetry: 50 beats per minute (02/01/20 0000)  O2 Device: Room air (02/01/20 0000)      Intake and Output:     Intake/Output Summary (Last 24 hours) at 2/1/2020 1147  Last data filed at 2/1/2020 0322  Gross per 24 hour   Intake    Output 475 ml   Net -475 ml         Physical Exam:   GENERAL: alert, cooperative, no distress, appears stated age  EYE: conjunctivae/corneas clear. PERRL. THROAT & NECK: normal and no erythema or exudates noted. LUNG: clear to auscultation bilaterally  HEART: regular rate and rhythm, S1S2, III/VI TOM, no JVD  ABDOMEN: soft, non-tender, non-distended. Bowel sounds normal.   EXTREMITIES:  No edema, 2+ pedal/radial pulses bilaterally, finger-to-nose slightly off  SKIN: no rash or abnormalities  NEUROLOGIC: A&Ox3. Cranial nerves 2-12 grossly intact.     Lab/Data Review:  Recent Results (from the past 24 hour(s))   EKG, 12 LEAD, INITIAL    Collection Time: 01/31/20  6:31 PM   Result Value Ref Range    Ventricular Rate 55 BPM    Atrial Rate 55 BPM    P-R Interval 170 ms    QRS Duration 108 ms    Q-T Interval 420 ms    QTC Calculation (Bezet) 401 ms    Calculated P Axis 40 degrees    Calculated R Axis -61 degrees    Calculated T Axis -21 degrees    Diagnosis       Sinus bradycardia with sinus arrhythmia  Left axis deviation / lafb  Possible Anterior infarct , age undetermined  Abnormal ECG  When compared with ECG of 10-AUG-2000 16:22,  Inverted T waves have replaced nonspecific T wave abnormality in Inferior   leads  Confirmed by [facility]  MD ()KYRIE ([id number]) on 2/1/2020 8:15:01 AM     CBC WITH AUTOMATED DIFF    Collection Time: 01/31/20  6:41 PM   Result Value Ref Range    WBC 8.9 4.3 - 11.1 K/uL    RBC 4.06 (L) 4.23 - 5.6 M/uL    HGB 13.6 13.6 - 17.2 g/dL    HCT 42.7 41.1 - 50.3 %    .2 (H) 79.6 - 97.8 FL    MCH 33.5 (H) 26.1 - 32.9 PG    MCHC 31.9 31.4 - 35.0 g/dL    RDW 13.9 11.9 - 14.6 %    PLATELET 322 852 - 944 K/uL    MPV 12.0 9.4 - 12.3 FL    ABSOLUTE NRBC 0.00 0.0 - 0.2 K/uL    DF AUTOMATED      NEUTROPHILS 61 43 - 78 %    LYMPHOCYTES 22 13 - 44 %    MONOCYTES 9 4.0 - 12.0 %    EOSINOPHILS 6 0.5 - 7.8 %    BASOPHILS 1 0.0 - 2.0 %    IMMATURE GRANULOCYTES 1 0.0 - 5.0 %    ABS. NEUTROPHILS 5.5 1.7 - 8.2 K/UL    ABS. LYMPHOCYTES 1.9 0.5 - 4.6 K/UL    ABS. MONOCYTES 0.8 0.1 - 1.3 K/UL    ABS. EOSINOPHILS 0.6 0.0 - 0.8 K/UL    ABS. BASOPHILS 0.1 0.0 - 0.2 K/UL    ABS. IMM. GRANS. 0.1 0.0 - 0.5 K/UL   METABOLIC PANEL, COMPREHENSIVE    Collection Time: 01/31/20  6:41 PM   Result Value Ref Range    Sodium 138 136 - 145 mmol/L    Potassium 5.9 (H) 3.5 - 5.1 mmol/L    Chloride 110 (H) 98 - 107 mmol/L    CO2 20 (L) 21 - 32 mmol/L    Anion gap 8 7 - 16 mmol/L    Glucose 90 65 - 100 mg/dL    BUN 33 (H) 8 - 23 MG/DL    Creatinine 1.71 (H) 0.8 - 1.5 MG/DL    GFR est AA 49 (L) >60 ml/min/1.73m2    GFR est non-AA 40 (L) >60 ml/min/1.73m2    Calcium 9.3 8.3 - 10.4 MG/DL    Bilirubin, total 0.5 0.2 - 1.1 MG/DL    ALT (SGPT) 26 12 - 65 U/L    AST (SGOT) 30 15 - 37 U/L    Alk.  phosphatase 109 50 - 136 U/L    Protein, total 7.2 6.3 - 8.2 g/dL    Albumin 3.7 3.2 - 4.6 g/dL    Globulin 3.5 2.3 - 3.5 g/dL    A-G Ratio 1.1 (L) 1.2 - 3.5     TROPONIN I    Collection Time: 01/31/20 6:41 PM   Result Value Ref Range    Troponin-I, Qt. <0.02 (L) 0.02 - 0.05 NG/ML   CBC W/O DIFF    Collection Time: 02/01/20  5:17 AM   Result Value Ref Range    WBC 7.3 4.3 - 11.1 K/uL    RBC 3.73 (L) 4.23 - 5.6 M/uL    HGB 12.3 (L) 13.6 - 17.2 g/dL    HCT 38.8 (L) 41.1 - 50.3 %    .0 (H) 79.6 - 97.8 FL    MCH 33.0 (H) 26.1 - 32.9 PG    MCHC 31.7 31.4 - 35.0 g/dL    RDW 13.8 11.9 - 14.6 %    PLATELET 233 (L) 733 - 450 K/uL    MPV 11.6 9.4 - 12.3 FL    ABSOLUTE NRBC 0.00 0.0 - 0.2 K/uL   LIPID PANEL    Collection Time: 02/01/20  5:17 AM   Result Value Ref Range    LIPID PROFILE          Cholesterol, total 131 <200 MG/DL    Triglyceride 119 35 - 150 MG/DL    HDL Cholesterol 45 40 - 60 MG/DL    LDL, calculated 62.2 <100 MG/DL    VLDL, calculated 23.8 (H) 6.0 - 23.0 MG/DL    CHOL/HDL Ratio 2.9     HEMOGLOBIN A1C WITH EAG    Collection Time: 02/01/20  5:17 AM   Result Value Ref Range    Hemoglobin A1c 5.3 4.8 - 6.0 %    Est. average glucose 379 mg/dL   METABOLIC PANEL, BASIC    Collection Time: 02/01/20  5:17 AM   Result Value Ref Range    Sodium 142 136 - 145 mmol/L    Potassium 5.1 3.5 - 5.1 mmol/L    Chloride 114 (H) 98 - 107 mmol/L    CO2 23 21 - 32 mmol/L    Anion gap 5 (L) 7 - 16 mmol/L    Glucose 82 65 - 100 mg/dL    BUN 29 (H) 8 - 23 MG/DL    Creatinine 1.61 (H) 0.8 - 1.5 MG/DL    GFR est AA 52 (L) >60 ml/min/1.73m2    GFR est non-AA 43 (L) >60 ml/min/1.73m2    Calcium 9.6 8.3 - 10.4 MG/DL       Imaging:  Mri Brain Wo Cont    Result Date: 2/1/2020  MRI of the brain INDICATION: Ataxia and left-sided weakness, history of prostate cancer Standard MRI sequences were obtained through the brain in multiple planes without IV contrast FINDINGS: Small areas of peripheral resected diffusion are also present in the left occipital and superior parietal cortex. .  These appear more acute. There is no associated hemorrhage. There is moderate chronic change in white matter. The ventricles are normal in size.   There are no extra-axial fluid collections. There are no intracranial masses. There are no bony lesions. The sinuses are clear. IMPRESSION: Multiple small lacunar infarcts. Left occipital and parietal infarcts are probably acute. Right carli infarct is likely subacute. No evidence of hemorrhage. Ct Head Wo Cont    Result Date: 1/31/2020  Exam: CT head without contrast. Indication: Ataxia. Comparison: None. Multiple axial images obtained through the brain without intravenous contrast. Radiation dose reduction techniques were used for this study: All CT scans performed at this facility use one or all of the following: Automated exposure control, adjustment of the mA and/or kVp according to patient's size, iterative reconstruction. FINDINGS: Diffuse cerebral atrophy with commensurate ex vacuo dilatation of the ventricles. Scattered periventricular and centrum semiovale white matter hypointensities most indicative of chronic ischemic white matter change. No evidence of intracranial mass, hemorrhage, or large territorial infarct. The basal cisterns are patent. No extra-axial fluid collection or mass effect. Bilateral lens replacements. The paranasal sinuses are clear. The mastoid air cells and middle ears are clear. No significant osseous or extracranial soft tissue lesions. IMPRESSION: 1. No evidence of acute intracranial abnormality. Chronic changes as above. No results found for this visit on 01/31/20. Cultures:   All Micro Results     None          Assessment/Plan:     Principal Problem:    Acute CVA (cerebrovascular accident) ([address] (2/1/2020)  - MRI Brain with multiple small lacunar infarcts - left occipital and parietal infarcts are acute & right carli infarct is likely subacute  - Continue remote telemetry  - Continue ASA 325mg  - Continue Lipitor  - Continue Lisinopril  - Check Carotid U/S  - ECHO pending  - Consult Neurology  - PT/OT/SLP    Active Problems:    Dizziness (1/31/2020)  - Due to #1  - Slowly improving  - PT/OT      Left leg paresthesias (1/31/2020)  - Due to #1  - Slowly improving  - PT/OT      Irregular cardiac rhythm (5/14/2013)  - Patient unsure to tell me rhythm  - States he had \"complete work up\" twice with normal results  - Continue remote telemetry      Hyperkalemia (1/31/2020)  - Resolved  - Check BMP daily      Hypertension (8/18/2012)  - Stable  - Continue Lisinopril  - Continue Amlodipine      Chronic kidney disease, stage III (moderate) (HCC) (8/18/2012)  - Stable      Peptic ulcer disease (8/18/2012)  - No acute issues  - Continue Protonix      Aortic stenosis, moderate (4/21/2017)  - ECHO pending      Mixed hyperlipidemia (8/18/2012)  - Continue Atorvastatin    Dispo - Check ECHO and carotid U/S. Neurology consult. PT/OT/SLP. Hopefully discharge in next 1-2 days.     DIET REGULAR    DVT Prophylaxis: Heparin      Signed By: Alexander Mathis DO     February 1, 2020

## 2020-02-01 NOTE — CONSULTS
Consult    Patient: Doris Cristina MRN: [id number]     YOB: 1932  Age: 80 y.o. Sex: male      Subjective:      Doris Cristina is a 80 y.o. male who is being seen for stroke. PMH significant for prostate CA, CKD stage 3, AV stenosis, PUD, HTN, Hyperlipidemia, former smoker. Patient stated he went to bed on Thursday night around 10 PM, when he woke up on Friday morning at 9:00 AM he noticed that he was having difficulty ambulating and stated he felt off balance, dizzy, and weak in his LUE/LE. He stated he had to hold on to the wall and chair to keep from falling. He stated he felt clumsy with his LUE, and LLE was not working right. Stated he had some improvement in his symptoms, and did not seek medical attention at that time. About 4 PM, he called his friend on the phone and his friend reported his speech was slurred. His friend came to his house and called 911. Denies visual changes, nausea/vomiting, HA, CP, SOB, palpitation. EMS brought patient to ED. Code S was not initiated. Initial NIH 1 for dysarthria. CT of head negative for acute intracranial abnormality. He was not candidate for tPA due to outside of 4.5hour timeframe and low NIH score of 1. He was not candidate for CRHISTINA due to low NIH. TTE completed in 8/19 demonstrated EF 60-65%, LA and RA mild dilation, AV sclerosis with reduced leaflet excursion, mild AV stenosis.       Past Medical History:   Diagnosis Date    Cancer of prostate ([facility] [address] 2001    radiation tx     CKD (chronic kidney disease) stage 3, GFR 30-59 ml/min ([facility]) 8/18/2012    Gout 7/32/4577    Metabolic syndrome 3/03/0250    Mixed hyperlipidemia 8/18/2012    Moderate aortic valve stenosis     Peptic ulcer disease 8/18/2012    Unspecified essential hypertension 8/18/2012    Urinary retention      Past Surgical History:   Procedure Laterality Date    HX APPENDECTOMY  1950    HX CHOLECYSTECTOMY      HX COLONOSCOPY  2002    HX GASTRECTOMY      V&A, 1999  HX TURP  , 2015    Dx CA, followed by radiation Rx      Family History   Problem Relation Age of Onset    Dementia Father      Social History     Tobacco Use    Smoking status: Former Smoker     Packs/day: 1.00     Years: 10.00     Pack years: 10.00     Last attempt to quit: 1968     Years since quittin.0    Smokeless tobacco: Never Used    Tobacco comment: quit 60 years ago    Substance Use Topics    Alcohol use: No      Current Facility-Administered Medications   Medication Dose Route Frequency Provider Last Rate Last Dose    amLODIPine (NORVASC) tablet 5 mg  5 mg Oral DAILY Arian TREVIÑO MD   5 mg at 20 0906    lisinopril (PRINIVIL, ZESTRIL) tablet 40 mg  40 mg Oral DAILY Arian TREVIÑO MD   40 mg at 20 2478    finasteride (PROSCAR) tablet 5 mg  5 mg Oral DAILY Arian TREVIÑO MD   5 mg at 20 0906    fluticasone propionate (FLONASE) 50 mcg/actuation nasal spray 2 Spray  2 Spray Both Nostrils DAILY Yao Gonsalez MD   2 Startex at 20 0910    pantoprazole (PROTONIX) tablet 40 mg  40 mg Oral DAILY Arian TREVIÑO MD   40 mg at 20 5134    tamsulosin (FLOMAX) capsule 0.4 mg  0.4 mg Oral PCD Yao Gonsalez MD        sodium chloride (NS) flush 5-40 mL  5-40 mL IntraVENous Q8H Arian TREVIÑO MD   5 mL at 20 8018    sodium chloride (NS) flush 5-40 mL  5-40 mL IntraVENous PRN Yao Gonsalez MD        0.9% sodium chloride infusion  100 mL/hr IntraVENous CONTINUOUS Arian TREVIÑO  mL/hr at 20 2240 100 mL/hr at 20 2240    aspirin tablet 325 mg  325 mg Oral DAILY Arian TREVIÑO MD   325 mg at 20 3488    atorvastatin (LIPITOR) tablet 40 mg  40 mg Oral QHS Arian TREVIÑO MD   40 mg at 20 2232    heparin (porcine) injection 5,000 Units  5,000 Units SubCUTAneous Q8H Arian TREVIÑO MD   5,000 Units at 20 7109    allopurinoL (ZYLOPRIM) tablet 150 mg  150 mg Oral DAILY Yao Gonsalez MD   150 mg at 02/01/20 0906        Allergies   Allergen Reactions    Neurontin [Gabapentin] Other (comments)     Dizzy. Pt reports that he's now taking the medicine at night before bed - no longer having issues and is continuing to take this med.  9/22/2016       Review of Systems:  CONSTITUTIONAL:  Denies weight loss, fever, chills, weakness or fatigue. HEENT:  Eyes:  Denies visual loss, blurred vision, double vision or yellow sclerae. Ears, Nose, Throat: [place] Denies sneezing, congestion, runny nose or sore throat. SKIN:  Denies rash or itching. CARDIOVASCULAR:  Denies chest pain, chest pressure or chest discomfort. No palpitations or edema. RESPIRATORY:  Denies shortness of breath, cough or sputum. GASTROINTESTINAL:  Denies anorexia, nausea, vomiting or diarrhea. No abdominal pain or blood. GENITOURINARY:  Denies burning with urination. NEUROLOGICAL:  LUE/LE weakness, gait imbalance Denies headache, dizziness, syncope, paralysis, ataxia, numbness or tingling in the extremities. Denies change in bowel or bladder control. MUSCULOSKELETAL: chronic bilateral knee pain  Denies muscle, back pain, joint pain or stiffness. HEMATOLOGIC:  Denies anemia, bleeding or bruising. LYMPHATICS:  Denies enlarged nodes. PSYCHIATRIC: Denies history depression or anxiety. ENDOCRINOLOGIC:  Denies reports of sweating, cold or heat intolerance. No polyuria or polydipsia. ALLERGIES:  Denies history of asthma, hives, eczema or rhinitis. Objective:     Vitals:    02/01/20 0000 02/01/20 0400 02/01/20 0800 02/01/20 1200   BP: 146/69 120/55 121/70 135/73   Pulse: (!) 50 (!) 46 64 72   Resp: 19 20 19 18   Temp: 97.9 °F (36.6 °C) 97.4 °F (36.3 °C) 98 °F (36.7 °C) 97.6 °F (36.4 °C)   SpO2: 98% 94% 92% 94%        Physical Exam:  General - Well developed, well nourished, in no apparent distress. Pleasant and conversent. HEENT - Normocephalic, atraumatic. Conjunctiva, tympanic membranes, and oropharynx are clear.    Neck - Supple without masses, no bruits   Cardiovascular - Regular rate and rhythm. Normal S1, S2 with systolic murmur, no rubs, or gallops. Lungs - Clear to auscultation. Abdomen - Soft, nontender with normal bowel sounds. Extremities - Peripheral pulses intact. No edema and no rashes. Neurological examination - Comprehension, attention, memory and reasoning are intact. Language and speech are mild dysarthria. On cranial nerve examination, (II, III, IV, VI) pupils are equal, round, and reactive to light. Visual acuity is adequate. Visual fields are full to finger confrontation. Extraocular motility is normal. (V, VII) Face is symmetric and sensation is intact to light touch. (VIII) Hearing is intact. (IX, X) Palate and uvula elevate symmetrically. Voice is normal. (XI) Shoulder shrug is strong and equal bilaterally. (XII)Tongue is midline. Motor examination - There is normal muscle tone and bulk. Power is full throughout, with exception of LUE pronation. Muscle stretch reflexes are normoactive and there are no pathological reflexes present. Plantar response is flexor. Sensation is intact to light touch, pinprick, vibration and proprioception in all extremities. Cerebellar examination is normal finger/nose and heel/shin.       NIHSS   NIHSS Score: 1  1a-Level of Consciousness 0  1b-What is Month/Age 0  1c-Open/Close Eyes&Hand 0  2 -Best Gaze 0  3 -Visual Fields 0  4 -Facial Palsy 0  5a-Motor-Left Arm 0  5b-Motor-Right Arm 0  6a-Motor-Left Leg 0  6b-Motor-Right Leg 0  7 -Limb Ataxia 0  8 -Sensory 0  9 -Best Language 0  10-Dysarthria 1  11-Extinction/Inattention 0    Lab Results   Component Value Date/Time    Cholesterol, total 131 02/01/2020 05:17 AM    HDL Cholesterol 45 02/01/2020 05:17 AM    LDL, calculated 62.2 02/01/2020 05:17 AM    VLDL, calculated 23.8 (H) 02/01/2020 05:17 AM    Triglyceride 119 02/01/2020 05:17 AM    CHOL/HDL Ratio 2.9 02/01/2020 05:17 AM        Lab Results   Component Value Date/Time    Hemoglobin A1c 5.3 02/01/2020 05:17 AM        CT Results (most recent):  Results from [place] encounter on 01/31/20   CT HEAD WO CONT    Narrative Exam: CT head without contrast.  Indication: Ataxia. Comparison: None. Multiple axial images obtained through the brain without intravenous contrast.   Radiation dose reduction techniques were used for this study: All CT scans  performed at this facility use one or all of the following: Automated exposure  control, adjustment of the mA and/or kVp according to patient's size, iterative  reconstruction. FINDINGS:    Diffuse cerebral atrophy with commensurate ex vacuo dilatation of the  ventricles. Scattered periventricular and centrum semiovale white matter  hypointensities most indicative of chronic ischemic white matter change. No  evidence of intracranial mass, hemorrhage, or large territorial infarct. The  basal cisterns are patent. No extra-axial fluid collection or mass effect. Bilateral lens replacements. The paranasal sinuses are clear. The mastoid air  cells and middle ears are clear. No significant osseous or extracranial soft  tissue lesions. Impression IMPRESSION:  1. No evidence of acute intracranial abnormality. Chronic changes as above.        Results for orders placed or performed during the hospital encounter of 01/31/20   EKG, 12 LEAD, INITIAL   Result Value Ref Range    Ventricular Rate 55 BPM    Atrial Rate 55 BPM    P-R Interval 170 ms    QRS Duration 108 ms    Q-T Interval 420 ms    QTC Calculation (Bezet) 401 ms    Calculated P Axis 40 degrees    Calculated R Axis -61 degrees    Calculated T Axis -21 degrees    Diagnosis       Sinus bradycardia with sinus arrhythmia  Left axis deviation / lafb  Possible Anterior infarct , age undetermined  Abnormal ECG  When compared with ECG of 10-AUG-2000 16:22,  Inverted T waves have replaced nonspecific T wave abnormality in Inferior   leads  Confirmed by Jonas Escalona MD (), KYRIE AYALA ([id number]) on 2/1/2020 8:15:01 AM          MRI Results (most recent):   Results from [place] encounter on 01/31/20   MRI BRAIN WO CONT    Narrative MRI of the brain     INDICATION: Ataxia and left-sided weakness, history of prostate cancer    Standard MRI sequences were obtained through the brain in multiple planes  without IV contrast    FINDINGS:  Small areas of peripheral resected diffusion are also present in the left  occipital and superior parietal cortex. .  These appear more acute. There is no  associated hemorrhage. There is moderate chronic change in white matter. The  ventricles are normal in size. There are no extra-axial fluid collections. There are no intracranial masses. There are no bony lesions. The sinuses are  clear. Impression IMPRESSION: Multiple small lacunar infarcts. Left occipital and parietal  infarcts are probably acute. Right carli infarct is likely subacute. No  evidence of hemorrhage. Most recent CTA:      Most recent MRA:      Most recent Echo:  No results found for this visit on 01/31/20. Assessment:     80year old male being seen for acute ischemic stroke, multiple vascular territories, likely cardio embolic etiology. NIH 1 for dysarthria. CT of head negative for acute intracranial abnormalities. No CTA or MRA given patient's renal function. TTE completed in 8/19 demonstrated EF 60-65%, LA and RA mild dilation, AV sclerosis with reduced leaflet excursion, mild AV stenosis. Carotid US and TTE with bubble study pending.       Plan:     · Start ASA 81 mg daily   · Initiate high intensity statin   · Neurochecks Q4H  · Carotid US pending  · Bedside swallow test   · Labs: A1c, FLP, TSH, Cardiac enzymes, BMP, CBC  · Telemetry and echocardiogram with bubble study  · PT/OT/ST  · DVT prophylaxis   · BP management - normotensive, with long-term goal <130/80  · Smoking cessation if applicable   · Diabetes education if applicable   · Depression Screening prior to discharge      Signed By: Doe Mcintosh NP     February 1, 2020      Attending physician note  MRI reviewed. Location of infarcts specifically cortical, and the lateral medullary all suggest cardioembolic event. Echo last summer not specifically remarkable but being repeated. Based upon the size of the infarcts there is no contraindication to the immediate usage of anticoagulation, and even if no findings on the echo I would have sufficient strong presumption that that is the etiology to recommend a direct oral anticoagulant  Only item conceivably in the differential would be a vasculitis which based upon age and gender would be most unlikely.   Sedimentation rate and C-reactive protein will be checked

## 2020-02-01 NOTE — PROGRESS NOTES
[address]  Face to Face Encounter    Patients Name: Codi Dominguez    YOB: 1932    Ordering Physician: Yarely Lopez DO    Primary Diagnosis: CVA (cerebral vascular accident) [facility]) [I63.9]  Acute CVA (cerebrovascular accident) [facility]) [I63.9]    Date of Face to Face:   2/1/2020                                  Face to Face Encounter findings are related to primary reason for home care:   yes. 1. I certify that the patient needs intermittent care as follows: skilled nursing care:  skilled observation/assessment, patient education  physical therapy: strengthening  occupational therapy:  ADL safety (ie. cooking, bathing, dressing)    2. I certify that this patient is homebound, that is: 1) patient requires the use of a walker device, special transportation, or assistance of another to leave the home; or 2) patient's condition makes leaving the home medically contraindicated; and 3) patient has a normal inability to leave the home and leaving the home requires considerable and taxing effort. Patient may leave the home for infrequent and short duration for medical reasons, and occasional absences for non-medical reasons. Homebound status is due to the following functional limitations: Patient with strength deficits limiting the performance of all ADL's without caregiver assistance or the use of an assistive device. 3. I certify that this patient is under my care and that I, or a nurse practitioner or  [id number], or clinical nurse specialist, or certified nurse midwife, working with me, had a Face-to-Face Encounter that meets the physician Face-to-Face Encounter requirements.   The following are the clinical findings from the [address] [place] [address] encounter that support the need for skilled services and is a summary of the encounter: see hospital medical record     See attached progess note      Rad Peoples LMSW  2/1/2020      THE FOLLOWING TO BE COMPLETED BY THE COMMUNITY PHYSICIAN:    I concur with the findings described above from the F2F encounter that this patient is homebound and in need of a skilled service.     Certifying Physician: _____________________________________      Printed Certifying Physician Name: _____________________________________    Date: _________________

## 2020-02-01 NOTE — PROGRESS NOTES
SPEECH PATHOLOGY NOTE:  Consult received and chart reviewed. Attempted to see patient for initial assessment, but he is currently off floor for MRI. Will try again at a later time/date as schedule permits and patient available.   Dulce Gonsales MA, CCC-SLP

## 2020-02-01 NOTE — PROGRESS NOTES
02/01/20 0654   NIH Stroke Scale   Interval Other (comment)  (Dual shift change NIH. )   LOC 0   LOC Questions 0   LOC Commands 0   Best Gaze 0   Visual 0   Facial Palsy 0   Motor Right Arm 0   Motor Left Arm 0   Motor Right Leg 0   Motor Left Leg 0   Limb Ataxia 0   Sensory 0   Best Language 0   Dysarthria 0   Extinction and Inattention 0   Total 0     Dual shift change NIH with Ericka Mueller RN.

## 2020-02-01 NOTE — PROGRESS NOTES
02/01/20 1836   NIH Stroke Scale   Interval Other (comment)  (dual nih with Chavez Mejia RN)   LOC 0   LOC Questions 0   LOC Commands 0   Best Gaze 0   Visual 0   Facial Palsy 0   Motor Right Arm 0   Motor Left Arm 0   Motor Right Leg 0   Motor Left Leg 0   Limb Ataxia 0   Sensory 0   Best Language 0   Dysarthria 0   Extinction and Inattention 0   Total 0   Dual [facility] with Ashlyn ELLIOTT

## 2020-02-01 NOTE — PROGRESS NOTES
01/31/20 2126   Dual Skin Pressure Injury Assessment   Dual Skin Pressure Injury Assessment X   Second Care Provider (Based on [address] [place] [address]) Beba Montoya RN   Skin Integumentary   Skin Integumentary (WDL) X   Skin Color Appropriate for ethnicity; Red  (Reddened edgar area. )   Skin Condition/Temp Dry;Fragile; Warm  (Thick toenails. )   Skin Integrity Scars (comment); Other (comment)  (Scattered scars. Area to back; \"old boil\" per pt. )   Turgor Non-tenting   Hair Growth Present   Varicosities Absent   Wound Prevention and Protection Methods   Orientation of Wound Prevention Posterior   Location of Wound Prevention Sacrum/Coccyx   Wound Offloading (Prevention Methods) Bed, pressure reduction mattress;Pillows;Repositioning     Sacral area intact. Reddened edgar area. Mucous membranes pink, moist, intact.

## 2020-02-01 NOTE — PROGRESS NOTES
Problem: Mobility Impaired (Adult and Pediatric)  Goal: *Acute Goals and Plan of Care (Insert Text)  Description  LTG:  (1.)Mr. Checo Juarez will move from supine to sit and sit to supine , scoot up and down and roll side to side in bed with MODIFIED INDEPENDENCE within 7 treatment day(s). (2.)Mr. Checo Juarez will transfer from bed to chair and chair to bed with MODIFIED INDEPENDENCE using the least restrictive device within 7 treatment day(s). (3.)Mr. Checo Juarez will ambulate with MODIFIED INDEPENDENCE for >xv=046 feet with the least restrictive device within 7 treatment day(s). (4.)Mr. Checo Juarez will perform B LE therapeutic exercises x 20 reps with INDEPENDENCE within 7 days to increase strength for improved safety and independence in transfers and gait. (5.)Mr. Checo Juarez will ascend/descend 4 steps with 1 handrail(s) and MODIFIED INDEPENDENCE within 7 day(s) for safe entry/exit of home.  ________________________________________________________________________________________________     Outcome: Progressing Towards Goal      PHYSICAL THERAPY: Initial Assessment, Daily Note, and PM 2/1/2020  INPATIENT: PT Visit Days : 1  Payor: Edgar Davis / Plan: [address] [place] [address] HMO / Product Type: [employer] Care Medicare /       NAME/AGE/GENDER: Denise Moralez is a 80 y.o. male   PRIMARY DIAGNOSIS: CVA (cerebral vascular accident) ([address] [I63.9]  Acute CVA (cerebrovascular accident) ([address] [I63.9] Acute CVA (cerebrovascular accident) ([address] Acute CVA (cerebrovascular accident) ([facility] [address]        ICD-10: Treatment Diagnosis:    Other lack of cordination (R27.8)  Difficulty in walking, Not elsewhere classified (R26.2)   Precaution/Allergies:  Neurontin [gabapentin]      ASSESSMENT:     Mr. Checo Juarez presents standing in room on contact with daughter at bedside. Pt lives with his daughter in one level home with basement. Pt was independent with all functional mobility and gait without assistive device prior to admission.  Pt drives and has no history of falls. Pt admitted for CVA. Pt is alert, oriented and agreeable to PT evaluation. Pt was able to move around room and perform ADLs modified independently with no significant loss of balance. Pt then ambulated 500' with no assistive device. Pt has old knee and hip injury which leads to an antalgic gait pattern. Pt demonstrated impaired dynamic standing balance but no overt  loss of balance. Worked on decreased trunk sway and increase step clearance with improved posture. Gait pattern improved with cueing. Pt left up in bedside chair with call bell in reach and chair alarm on and SLP at bedside. Skilled PT to con't to follow to maximize function and independence. Would benefit from HHPT if balance remains impaired at discharge. This section established at most recent assessment   PROBLEM LIST (Impairments causing functional limitations):  Decreased Ambulation Ability/Technique  Decreased Balance  Decreased [place] with Home Exercise Program   INTERVENTIONS PLANNED: (Benefits and precautions of physical therapy have been discussed with the patient.)  Balance Exercise  Bed Mobility  Gait Training  Home Exercise Program (HEP)  Therapeutic Activites  Therapeutic Exercise/Strengthening  Transfer Training     TREATMENT PLAN: Frequency/Duration: 3 times a week for duration of hospital stay  Rehabilitation Potential For Stated Goals: Good     REHAB RECOMMENDATIONS (at time of discharge pending progress):    Placement: It is my opinion, based on this patient's performance to date, that Mr. Machelle Kapadia may benefit from [address] after discharge due to the functional deficits listed above that are likely to improve with skilled rehabilitation because he/she has multiple medical issues that affect his/her functional mobility in the community.   Equipment:   None at this time              HISTORY:   History of Present Injury/Illness (Reason for Referral):  Per chart: Katelynn Kessler is an 80 y.o. male with a past medical history of CKD stage III,  who presents to the ER with report of feeling off balance and some clumsiness in his left arm and left leg. This started around 0900 this morning and has persisted until a few hours ago. Currently, he reports resolution of symptoms unless he gets up to walk, at which point he feels unsteady. Denies any problems with speech or swallowing  . Past Medical History/Comorbidities:   Mr. Michael Shah  has a past medical history of Cancer of prostate ([facility] [address] (2001), CKD (chronic kidney disease) stage 3, GFR 30-59 ml/min ([facility] [address] (8/18/2012), Gout (0/61/7079), Metabolic syndrome (7/43/6480), Mixed hyperlipidemia (8/18/2012), Moderate aortic valve stenosis, Peptic ulcer disease (8/18/2012), Unspecified essential hypertension (8/18/2012), and Urinary retention. He also has no past medical history of Difficult intubation, Malignant hyperthermia due to anesthesia, Nausea & vomiting, Pseudocholinesterase deficiency, or Unspecified adverse effect of anesthesia. Mr. Michael Shah  has a past surgical history that includes hx appendectomy (1950); hx cholecystectomy; hx gastrectomy; hx colonoscopy (2002); and hx turp (2000, 2015). Social History/Living Environment:   Home Environment: Private residence  # Steps to Enter: 3  Rails to Enter: Yes  Hand Rails : Bilateral  Wheelchair Ramp: No  One/Two Story Residence: One story  Living Alone: No  Support Systems: Child(baltazar)  Patient Expects to be Discharged to[de-identified] Private residence  Current DME Used/Available at Home: [address], [address], straight, Walker, rolling  Tub or Shower Type: Shower  Prior Level of Function/Work/Activity:  Pt lives with daughter in one level home. Drives. No recent falls. Independent with gait and ADLs. Personal Factors:          Sex:  male        Age:  80 y.o.    Number of Personal Factors/Comorbidities that affect the Plan of Care: 1-2: MODERATE COMPLEXITY   EXAMINATION:   Most Recent Physical Functioning:   Gross Assessment:  AROM: Within functional limits  Strength: Generally decreased, functional  Coordination: Generally decreased, functional  Tone: Normal  Sensation: Intact               Posture:     Balance:  Sitting: Intact  Standing: Impaired  Standing - Static: Fair  Standing - Dynamic : Fair Bed Mobility:  Rolling: (pt standing in room on contact, not tested)  Wheelchair Mobility:     Transfers:  Sit to Stand: Stand-by assistance  Stand to Sit: Stand-by assistance  Bed to Chair: Stand-by assistance  Gait:     Step Length: Right shortened;Left shortened  Swing Pattern: Right asymmetrical  Stance: Right decreased;Weight shift  Gait Abnormalities: Antalgic;Decreased step clearance;Trunk sway increased  Distance (ft): 500 Feet (ft)  Ambulation - Level of Assistance: Stand-by assistance      Body Structures Involved:  Nerves  Bones  Muscles Body Functions Affected:  Neuromusculoskeletal  Movement Related Activities and Participation Affected:  General Tasks and [address], Social and Civic Life   Number of elements that affect the Plan of Care: 4+: HIGH COMPLEXITY   CLINICAL PRESENTATION:   Presentation: Stable and uncomplicated: LOW COMPLEXITY   CLINICAL DECISION MAKIN [facility] [address] [id number] AM-PAC 6 Clicks   Basic Mobility Inpatient Short Form  How much difficulty does the patient currently have. .. Unable A Lot A Little None   1. Turning over in bed (including adjusting bedclothes, sheets and blankets)? [] 1   [] 2   [] 3   [x] 4   2. Sitting down on and standing up from a chair with arms ( e.g., wheelchair, bedside commode, etc.)   [] 1   [] 2   [x] 3   [] 4   3. Moving from lying on back to sitting on the side of the bed? [] 1   [] 2   [] 3   [x] 4   How much help from another person does the patient currently need. .. Total A Lot A Little None   4. Moving to and from a bed to a chair (including a wheelchair)? [] 1   [] 2   [x] 3   [] 4   5. Need to walk in hospital room?    [] 1   [] 2   [x] 3   [] 4 6.  Climbing 3-5 steps with a railing? [] 1   [] 2   [x] 3   [] 4   © 2007, Trustees of [address] [place] [address] [address] [address], under license to [employer]. All rights reserved      Score:  Initial: 20 Most Recent: X (Date: -- )    Interpretation of Tool:  Represents activities that are increasingly more difficult (i.e. Bed mobility, Transfers, Gait). Medical Necessity:     Patient demonstrates   good   rehab potential due to higher previous functional level. Reason for Services/Other Comments:  Patient   continues to require present interventions due to patient's inability to function at baseline   . Use of outcome tool(s) and clinical judgement create a POC that gives a: Clear prediction of patient's progress: LOW COMPLEXITY            TREATMENT:   (In addition to Assessment/Re-Assessment sessions the following treatments were rendered)   Pre-treatment Symptoms/Complaints: feels speech is still slurred compared to baseline  Pain: Initial:   Pain Intensity 1: 0  Post Session:  0/10     Gait Training ( 10 minutes):  Gait training to improve and/or restore physical functioning as related to mobility, strength, balance, and coordination. Ambulated 500 Feet (ft) with Stand-by assistance using no assistive device  and minimal   verbal cues related to their stance phase, knee position and motion, hip position and motion, and pelvis position and motion to promote proper body alignment and promote proper body mechanics. Instruction in proper gait cycle to improve safety and reduce fall risk.     Braces/Orthotics/Lines/Etc:   O2 Device: Room air  Treatment/Session Assessment:    Response to Treatment:  tolerated with no signs of distress  Interdisciplinary Collaboration:   Physical Therapist  Registered Nurse  After treatment position/precautions:   Up in chair  Bed alarm/tab alert on  Bed/Chair-wheels locked  Bed in low position  Call light within reach  RN notified  SLP at bedside for evaluation   Compliance with Program/Exercises: Will assess as treatment progresses  Recommendations/Intent for next treatment session: \"Next visit will focus on advancements to more challenging activities and reduction in assistance provided\".   Total Treatment Duration:  PT Patient Time In/Time Out  Time In: 1325  Time Out: [address], PT

## 2020-02-01 NOTE — DISCHARGE INSTRUCTIONS

## 2020-02-01 NOTE — H&P
HOSPITALIST INITIAL HISTORY AND PHYSICAL    NAME:  Leonard Andrade   Age:  80 y.o.  :   1932   MRN:   [id number]  PCP: Princess Oneyda MD  Consulting MD:  Treatment Team: Attending Provider: Yusef Hall MD    CHIEF COMPLAINT: weakness, dizziness    HISTORY OF PRESENT ILLNESS:   Leonard Andrade is an 80 y.o. male with a past medical history of CKD stage III,  who presents to the ER with report of feeling off balance and some clumsiness in his left arm and left leg. This started around 0900 this morning and has persisted until a few hours ago. Currently, he reports resolution of symptoms unless he gets up to walk, at which point he feels unsteady. Denies any problems with speech or swallowing  . REVIEW OF SYSTEMS: Comprehensive ROS performed. Denies fever, chills, nausea, vomiting, otherwise negative. Past Medical History:   Diagnosis Date    Cancer of prostate ([facility] [address]     radiation tx     CKD (chronic kidney disease) stage 3, GFR 30-59 ml/min [facility] 2012    Gout     Metabolic syndrome     Mixed hyperlipidemia 2012    Moderate aortic valve stenosis     Peptic ulcer disease 2012    Unspecified essential hypertension 2012    Urinary retention         Past Surgical History:   Procedure Laterality Date    HX APPENDECTOMY      HX CHOLECYSTECTOMY      HX COLONOSCOPY      HX GASTRECTOMY      V&A,     HX TURP  ,     Dx CA, followed by radiation Rx       Prior to Admission Medications   Prescriptions Last Dose Informant Patient Reported? Taking?   acetaminophen (TYLENOL EXTRA STRENGTH) 500 mg tablet   Yes No   Sig: Take 1,000 mg by mouth nightly. allopurinol (ZYLOPRIM) 300 mg tablet   No No   Sig: Take 1 Tab by mouth daily. amLODIPine (NORVASC) 5 mg tablet   No No   Sig: Take 1 Tab by mouth daily. benazepril (LOTENSIN) 40 mg tablet   No No   Sig: Take 1 Tab by mouth daily.    finasteride (PROSCAR) 5 mg tablet   No No Sig: Take 1 Tab by mouth daily. fluticasone propionate (FLONASE) 50 mcg/actuation nasal spray   No No   Si Sprays by Both Nostrils route daily. pantoprazole (PROTONIX) 40 mg tablet   No No   Sig: Take 1 Tab by mouth daily. red yeast rice extract 600 mg cap   Yes No   Sig: Take 600 mg by mouth daily. tamsulosin (FLOMAX) 0.4 mg capsule   No No   Sig: Take 1 Cap by mouth daily (after dinner). Facility-Administered Medications: None       Allergies   Allergen Reactions    Neurontin [Gabapentin] Other (comments)     Dizzy. Pt reports that he's now taking the medicine at night before bed - no longer having issues and is continuing to take this med.  2016       FAMILY HISTORY: Reviewed. Negative except   Family History   Problem Relation Age of Onset    Dementia Father        Social History     Tobacco Use    Smoking status: Former Smoker     Packs/day: 1.00     Years: 10.00     Pack years: 10.00     Last attempt to quit: 1968     Years since quittin.0    Smokeless tobacco: Never Used    Tobacco comment: quit 60 years ago    Substance Use Topics    Alcohol use: No         Objective:     Visit Vitals  /81 (BP 1 Location: Right arm, BP Patient Position: At rest)   Pulse (!) 52   Temp 98.4 °F (36.9 °C)   Resp 17   SpO2 95%      Temp (24hrs), Av °F (36.7 °C), Min:97.5 °F (36.4 °C), Max:98.4 °F (36.9 °C)    Oxygen Therapy  O2 Sat (%): 95 % (20)  Pulse via Oximetry: 52 beats per minute (20)  O2 Device: Room air (20)  Physical Exam:  General:    The patient is a pleasant elderly male in no acute distress. Head:   Normocephalic/atraumatic. Eyes:  No palpebral pallor or scleral icterus. ENT:  External auricular and nasal exam within normal limits. Mucous membranes are moist.  Neck:  Supple, non-tender, no JVD. Lungs:   Clear to auscultation bilaterally without wheezes or crackles.     No respiratory distress or accessory muscle use.  Heart:   Regular rate and rhythm, without murmurs, rubs, or gallops. Abdomen:   Soft, non-tender, non-distended with normoactive bowel sounds. Genitourinary: No tenderness over the bladder or bilateral CVAs. Extremities: Without clubbing, cyanosis, or edema. Skin:     Normal color, texture, and turgor. No rashes, lesions, or jaundice. Pulses: Radial and dorsalis pedis pulses present 2+ bilaterally. Capillary refill <2s. Neurologic: CN II-XII grossly intact and symmetrical.     Moving all four extremities well with no focal deficits. 5/5 BUE/BLE strength proximally/distally  Psychiatric: Pleasant demeanor, appropriate affect.  Alert and oriented x 3    Data Review:   Recent Results (from the past 24 hour(s))   EKG, 12 LEAD, INITIAL    Collection Time: 01/31/20  6:31 PM   Result Value Ref Range    Ventricular Rate 55 BPM    Atrial Rate 55 BPM    P-R Interval 170 ms    QRS Duration 108 ms    Q-T Interval 420 ms    QTC Calculation (Bezet) 401 ms    Calculated P Axis 40 degrees    Calculated R Axis -61 degrees    Calculated T Axis -21 degrees    Diagnosis       Sinus bradycardia with sinus arrhythmia  Left axis deviation  Possible Anterior infarct , age undetermined  Abnormal ECG  When compared with ECG of 10-AUG-2000 16:22,  Inverted T waves have replaced nonspecific T wave abnormality in Inferior   leads     CBC WITH AUTOMATED DIFF    Collection Time: 01/31/20  6:41 PM   Result Value Ref Range    WBC 8.9 4.3 - 11.1 K/uL    RBC 4.06 (L) 4.23 - 5.6 M/uL    HGB 13.6 13.6 - 17.2 g/dL    HCT 42.7 41.1 - 50.3 %    .2 (H) 79.6 - 97.8 FL    MCH 33.5 (H) 26.1 - 32.9 PG    MCHC 31.9 31.4 - 35.0 g/dL    RDW 13.9 11.9 - 14.6 %    PLATELET 173 523 - 754 K/uL    MPV 12.0 9.4 - 12.3 FL    ABSOLUTE NRBC 0.00 0.0 - 0.2 K/uL    DF AUTOMATED      NEUTROPHILS 61 43 - 78 %    LYMPHOCYTES 22 13 - 44 %    MONOCYTES 9 4.0 - 12.0 %    EOSINOPHILS 6 0.5 - 7.8 %    BASOPHILS 1 0.0 - 2.0 %    IMMATURE GRANULOCYTES 1 0.0 - 5.0 %    ABS. NEUTROPHILS 5.5 1.7 - 8.2 K/UL    ABS. LYMPHOCYTES 1.9 0.5 - 4.6 K/UL    ABS. MONOCYTES 0.8 0.1 - 1.3 K/UL    ABS. EOSINOPHILS 0.6 0.0 - 0.8 K/UL    ABS. BASOPHILS 0.1 0.0 - 0.2 K/UL    ABS. IMM. GRANS. 0.1 0.0 - 0.5 K/UL   METABOLIC PANEL, COMPREHENSIVE    Collection Time: 01/31/20  6:41 PM   Result Value Ref Range    Sodium 138 136 - 145 mmol/L    Potassium 5.9 (H) 3.5 - 5.1 mmol/L    Chloride 110 (H) 98 - 107 mmol/L    CO2 20 (L) 21 - 32 mmol/L    Anion gap 8 7 - 16 mmol/L    Glucose 90 65 - 100 mg/dL    BUN 33 (H) 8 - 23 MG/DL    Creatinine 1.71 (H) 0.8 - 1.5 MG/DL    GFR est AA 49 (L) >60 ml/min/1.73m2    GFR est non-AA 40 (L) >60 ml/min/1.73m2    Calcium 9.3 8.3 - 10.4 MG/DL    Bilirubin, total 0.5 0.2 - 1.1 MG/DL    ALT (SGPT) 26 12 - 65 U/L    AST (SGOT) 30 15 - 37 U/L    Alk. phosphatase 109 50 - 136 U/L    Protein, total 7.2 6.3 - 8.2 g/dL    Albumin 3.7 3.2 - 4.6 g/dL    Globulin 3.5 2.3 - 3.5 g/dL    A-G Ratio 1.1 (L) 1.2 - 3.5     TROPONIN I    Collection Time: 01/31/20  6:41 PM   Result Value Ref Range    Troponin-I, Qt. <0.02 (L) 0.02 - 0.05 NG/ML       Imaging /Procedures /Studies:  Ct Head Wo Cont    Result Date: 1/31/2020  IMPRESSION: 1. No evidence of acute intracranial abnormality. Chronic changes as above. Assessment and Plan:     Principal Problem:    CVA (cerebral vascular accident) ([facility] [address] (1/31/2020)    Concerning symptoms. Observe on remote tele. MRI brain, TTE. NPO. PT/OT/ST/rehab. Consults. ASA, Lipitor, Lovenox. Active Problems:    Dizziness (1/31/2020)    Per above. Left leg paresthesias (1/31/2020)    Per above. Hyperkalemia (1/31/2020)    Mild. IVF. FOllow BMP. ANTONY (acute kidney injury) ([facility] [address] (1/31/2020)    IVF, follow BMP. Chronic kidney disease, stage III (moderate) (HCC) (8/18/2012)    Follow BMP. Hypertension (8/18/2012)    Stable. Continue home meds. Mixed hyperlipidemia (8/18/2012)    Stable. Continue home meds.       Peptic ulcer disease (8/18/2012)    Stable. DVT Prophylaxis: Lovenox      Code Status: FULL CODE      Disposition: Observe on remote tele for evaluation and treatment as per above.       Anticipated discharge: < 2midnights     Signed By: Jarrett Valdivia MD     January 31, 2020

## 2020-02-01 NOTE — PROGRESS NOTES
LMSW met with pt. He resides alone and normally manages ADL's. Consulted for discharge planning after admission for CVA. Reviewed therapy notes for recommendations. PT and OT recommended HH follow up therapy. ST plans to do a cognitive assessment but cleared pt today for a regular diet with thin liquids. Provided pt with [facility] for review. Pt chose a referral to [facility]. Will finalize referral to [facility] for follow up care as appropriate prior to pt discharge home. Care Management Interventions  PCP Verified by CM: Eugenie Layne)  Mode of Transport at Discharge: Other (see comment)(family)  Transition of Care Consult (CM Consult): [address]: Yes  Discharge Durable Medical Equipment: (Pt has a rolling walker and cane per his report)  Physical Therapy Consult: Yes  Occupational Therapy Consult: Yes  Speech Therapy Consult: Yes  Current Support Network: Own Home, Family Lives Nearby  Confirm Follow Up Transport: Family  The Plan for Transition of Care is Related to the Following Treatment Goals : Pt will need supportive care services to return to his functional baseline.     The Patient and/or Patient Representative was Provided with a Choice of Provider and Agrees with the Discharge Plan?: Yes  Name of the Patient Representative Who was Provided with a Choice of Provider and Agrees with the Discharge Plan: pt  Freedom of Choice List was Provided with Basic Dialogue that Supports the Patient's Individualized Plan of Care/Goals, Treatment Preferences and Shares the Quality Data Associated with the Providers?: Yes  [place] Resource Information Provided?: No  Discharge Location  Discharge Placement: Home with home health

## 2020-02-01 NOTE — PROGRESS NOTES
SPEECH LANGUAGE PATHOLOGY: DYSPHAGIA- Initial Assessment    NAME/AGE/GENDER: [facility] is a 80 y.o. male  DATE: 2/1/2020  PRIMARY DIAGNOSIS: CVA (cerebral vascular accident) ([facility] [I63.9]  Acute CVA (cerebrovascular accident) ([facility] [I63.9]      ICD-10: Treatment Diagnosis: R13.12 Dysphagia, Oropharyngeal Phase    INTERDISCIPLINARY COLLABORATION: Physical Therapist  PRECAUTIONS/ALLERGIES: Neurontin [gabapentin]       SUBJECTIVE   Patient just completing physical therapy assessment and is upright in chair. History of Present Injury/Illness: Mr. Torie Turner  has a past medical history of Cancer of prostate ([facility] (2001), CKD (chronic kidney disease) stage 3, GFR 30-59 ml/min ([facility] (8/18/2012), Gout (2/08/5287), Metabolic syndrome (3/44/8400), Mixed hyperlipidemia (8/18/2012), Moderate aortic valve stenosis, Peptic ulcer disease (8/18/2012), Unspecified essential hypertension (8/18/2012), and Urinary retention. He also has no past medical history of Difficult intubation, Malignant hyperthermia due to anesthesia, Nausea & vomiting, Pseudocholinesterase deficiency, or Unspecified adverse effect of anesthesia. Azalia Mcelroy He also  has a past surgical history that includes hx appendectomy (1950); hx cholecystectomy; hx gastrectomy; hx colonoscopy (2002); and hx turp (2000, 2015). Problem List:  (Impairments causing functional limitations):  1.  CVA   Previous Dysphagia: NONE REPORTED    Diet Prior to Evaluation: regular textures, thin liquids       Orientation:   Person  Place  Time  Situation    Pain: Pain Scale 1: Numeric (0 - 10)  Pain Intensity 1: 0       Cognitive-Linguistic Screen:   Speech Production:   o WNL   Expressive Language:  o Appears intact   Receptive Language:  o Appears intact   Cognition:   o Needs further assessment  o Appears intact       OBJECTIVE   Oral Motor:   · Labial: No impairment  · Dentition: Intact  · Oral Hygiene: Adequate  · Lingual: No impairment     Swallow assessment:   Patient presented with thin liquids, applesauce, mixed consistency and cracker. Patient self-fed all trials. ASSESSMENT   Patient presents with oral and pharyngeal phase of swallow within functional limits with no overt signs or symptoms of aspiration observed with any consistency assessed. Swallows of all textures timely, clear to cervical auscultation and with adequate laryngeal excursion. Voice clear after swallow. No oral residue observed. Conversational speech clear and appropriate. Patient may benefit from high-level cognitive assessment due to CVA per MRI. Recommend continue current regular texture diet and thin liquids. Give meds with water. No further skilled swallow intervention currently indicated. Tool Used: Dysphagia Outcome and Severity Scale (DOLORES)    Score Comments   Normal Diet  [] 7 With no strategies or extra time needed   Functional Swallow  [] 6 May have mild oral or pharyngeal delay   Mild Dysphagia  [] 5 Which may require one diet consistency restricted    Mild-Moderate Dysphagia  [] 4 With 1-2 diet consistencies restricted   Moderate Dysphagia  [] 3 With 2 or more diet consistencies restricted   Moderate-Severe Dysphagia  [] 2 With partial PO strategies (trials with ST only)   Severe Dysphagia  [] 1 With inability to tolerate any PO safely      Score:  Initial: 7 Most Recent: 7 (Date 02/01/20 )   Interpretation of Tool: The Dysphagia Outcome and Severity Scale (DOLORSE) is a simple, easy-to-use, 7-point scale developed to systematically rate the functional severity of dysphagia based on objective assessment and make recommendations for diet level, independence level, and type of nutrition. Current Medications:   No current facility-administered medications on file prior to encounter. Current Outpatient Medications on File Prior to Encounter   Medication Sig Dispense Refill    allopurinol (ZYLOPRIM) 300 mg tablet Take 1 Tab by mouth daily.  90 Tab 1    amLODIPine (NORVASC) 5 mg tablet Take 1 Tab by mouth daily. 90 Tab 1    benazepril (LOTENSIN) 40 mg tablet Take 1 Tab by mouth daily. 90 Tab 1    finasteride (PROSCAR) 5 mg tablet Take 1 Tab by mouth daily. 90 Tab 1    fluticasone propionate (FLONASE) 50 mcg/actuation nasal spray 2 Sprays by Both Nostrils route daily. 3 Bottle 1    pantoprazole (PROTONIX) 40 mg tablet Take 1 Tab by mouth daily. 90 Tab 1    tamsulosin (FLOMAX) 0.4 mg capsule Take 1 Cap by mouth daily (after dinner). 90 Cap 1    acetaminophen (TYLENOL EXTRA STRENGTH) 500 mg tablet Take 1,000 mg by mouth nightly.  red yeast rice extract 600 mg cap Take 600 mg by mouth daily.              PLAN    FREQUENCY/DURATION: Speech therapy to follow up for assessment of cognitive-linguistic function.     - Recommendations for next treatment session: Next treatment will address cognitive assessment     REHABILITATION POTENTIAL FOR STATED GOALS: Excellent         RECOMMENDATIONS   DIET:    continue prescribed diet    MEDICATIONS: With liquid     ASPIRATION PRECAUTIONS  · Slow rate of intake  · Small bites/sips  · Upright at 90 degrees during meal     COMPENSATORY STRATEGIES/MODIFICATIONS  · None     EDUCATION:  · Recommendations discussed with Patient     RECOMMENDATIONS for CONTINUED SPEECH THERAPY:   To be determined after cognitive assessment       SAFETY:  After treatment position/precautions:  · Up in chair    Total Treatment Duration:   Time In: 1350  Time Out: 705 [facility] MA, CCC-SLP

## 2020-02-02 ENCOUNTER — HOME HEALTH ADMISSION (OUTPATIENT)
Dept: HOME HEALTH SERVICES | Facility: HOME HEALTH | Age: 85
End: 2020-02-02

## 2020-02-02 VITALS
TEMPERATURE: 97.7 F | HEART RATE: 73 BPM | RESPIRATION RATE: 18 BRPM | DIASTOLIC BLOOD PRESSURE: 61 MMHG | SYSTOLIC BLOOD PRESSURE: 105 MMHG | OXYGEN SATURATION: 97 %

## 2020-02-02 LAB
ANION GAP SERPL CALC-SCNC: 7 MMOL/L (ref 7–16)
BUN SERPL-MCNC: 32 MG/DL (ref 8–23)
CALCIUM SERPL-MCNC: 9.1 MG/DL (ref 8.3–10.4)
CHLORIDE SERPL-SCNC: 113 MMOL/L (ref 98–107)
CO2 SERPL-SCNC: 21 MMOL/L (ref 21–32)
CREAT SERPL-MCNC: 1.65 MG/DL (ref 0.8–1.5)
ERYTHROCYTE [DISTWIDTH] IN BLOOD BY AUTOMATED COUNT: 13.7 % (ref 11.9–14.6)
GLUCOSE SERPL-MCNC: 98 MG/DL (ref 65–100)
HCT VFR BLD AUTO: 37.3 % (ref 41.1–50.3)
HGB BLD-MCNC: 12 G/DL (ref 13.6–17.2)
MCH RBC QN AUTO: 33.1 PG (ref 26.1–32.9)
MCHC RBC AUTO-ENTMCNC: 32.2 G/DL (ref 31.4–35)
MCV RBC AUTO: 102.8 FL (ref 79.6–97.8)
NRBC # BLD: 0 K/UL (ref 0–0.2)
PLATELET # BLD AUTO: 129 K/UL (ref 150–450)
PMV BLD AUTO: 11.3 FL (ref 9.4–12.3)
POTASSIUM SERPL-SCNC: 5 MMOL/L (ref 3.5–5.1)
RBC # BLD AUTO: 3.63 M/UL (ref 4.23–5.6)
SODIUM SERPL-SCNC: 141 MMOL/L (ref 136–145)
WBC # BLD AUTO: 7 K/UL (ref 4.3–11.1)

## 2020-02-02 PROCEDURE — 74011250636 HC RX REV CODE- 250/636: Performed by: FAMILY MEDICINE

## 2020-02-02 PROCEDURE — 80048 BASIC METABOLIC PNL TOTAL CA: CPT

## 2020-02-02 PROCEDURE — 99232 SBSQ HOSP IP/OBS MODERATE 35: CPT | Performed by: PSYCHIATRY & NEUROLOGY

## 2020-02-02 PROCEDURE — 36415 COLL VENOUS BLD VENIPUNCTURE: CPT

## 2020-02-02 PROCEDURE — 74011250637 HC RX REV CODE- 250/637: Performed by: FAMILY MEDICINE

## 2020-02-02 PROCEDURE — 85027 COMPLETE CBC AUTOMATED: CPT

## 2020-02-02 RX ORDER — ATORVASTATIN CALCIUM 40 MG/1
40 TABLET, FILM COATED ORAL
Qty: 30 TAB | Refills: 0 | Status: SHIPPED | OUTPATIENT
Start: 2020-02-02 | End: 2020-02-07 | Stop reason: SDUPTHER

## 2020-02-02 RX ORDER — ASPIRIN 81 MG/1
81 TABLET ORAL DAILY
Qty: 30 TAB | Refills: 0 | Status: SHIPPED | OUTPATIENT
Start: 2020-02-02

## 2020-02-02 RX ADMIN — FINASTERIDE 5 MG: 5 TABLET, FILM COATED ORAL at 08:10

## 2020-02-02 RX ADMIN — PANTOPRAZOLE SODIUM 40 MG: 40 TABLET, DELAYED RELEASE ORAL at 08:10

## 2020-02-02 RX ADMIN — ALLOPURINOL 150 MG: 300 TABLET ORAL at 08:10

## 2020-02-02 RX ADMIN — ASPIRIN 325 MG ORAL TABLET 325 MG: 325 PILL ORAL at 08:10

## 2020-02-02 RX ADMIN — HEPARIN SODIUM 5000 UNITS: 5000 INJECTION INTRAVENOUS; SUBCUTANEOUS at 05:52

## 2020-02-02 RX ADMIN — Medication 10 ML: at 05:53

## 2020-02-02 NOTE — PROGRESS NOTES
02/02/20 0655   NIH Stroke Scale   Interval Other (comment)  (dual NIH with Olga ELLIOTT)   LOC 0   LOC Questions 0   LOC Commands 0   Best Gaze 0   Visual 0   Facial Palsy 0   Motor Right Arm 0   Motor Left Arm 0   Motor Right Leg 0   Motor Left Leg 0   Limb Ataxia 0   Sensory 0   Best Language 0   Dysarthria 0   Extinction and Inattention 0   Total 0

## 2020-02-02 NOTE — PROGRESS NOTES
Pt resting in bed, call light within reach, side rails up x 3, and bed low and locked. Shift assessment complete. NIH performed in chart. No needs at this time.

## 2020-02-02 NOTE — DISCHARGE SUMMARY
Hospitalist Discharge Summary     Patient ID:  Juliana Malik  [id number]  77 y.o.  12/27/1932  Admit date: 1/31/2020  6:25 PM  Discharge date and time: 2/2/2020  Attending: Thelda Cushing, MD  PCP:  Shae Daly MD  Treatment Team: Attending Provider: Thelda Cushing, MD; Utilization Review: Mario Castillo RN; Consulting Provider: Ceci Bach NP    Principal Diagnosis Acute CVA (cerebrovascular accident) [facility])   Principal Problem:    Acute CVA (cerebrovascular accident) ([facility] (2/1/2020)    Active Problems:    Hypertension (8/18/2012)      Mixed hyperlipidemia (8/18/2012)      Overview: Calcium score of 24      Chronic kidney disease, stage III (moderate) ([facility] (8/18/2012)      Peptic ulcer disease (8/18/2012)      Overview: S/P Vagotomy & Antrectomy, Dr.Dane Jeremias Hoang            Irregular cardiac rhythm (5/14/2013)      Overview: EKG 9/22/2016 shows PAC's, sinus rhythm, left anterior fascicular block      Aortic stenosis, moderate (4/21/2017)      Dizziness (1/31/2020)      Left leg paresthesias (1/31/2020)      Hyperkalemia (1/31/2020)             Hospital Course:  Please refer to the admission H&P for details of presentation. In summary, the patient is an 79yo M with a history of prostate CA, CKD3, AV stenosis, HLD, HTN, former tobacco abuse who presented with dizziness and slurred speech for several hours. Evaluated as code S in ER. Initial CT head negative, and outside time window for TPA. Initial NIH only 1. Found on MRI to have multiple small lacunar infarcts acute and subacute. Neurology consulted: findings demonstrative of cardioembolic events and recommended starting oral anticoagulation. No arrythmia seen on tele, but patient does report some unknown history of abnormal rhythm. Echo with mild AS, otherwise unremarkable. CRP/ESR wnl. Carotid US negative. Labs otherwise unremarkable. All symptoms resolved at time of discharge. Will be discharged home with [facility].       Eliquis, baby asa, and statin initiated. Continue follow-up with pcp for HTN control. Significant Diagnostic Studies:   DUPLEX CAROTID BILATERAL   Final Result   IMPRESSION:     No hemodynamically significant stenosis is identified. Atherosclerosis is   present at the carotid bulbs. MRI BRAIN WO CONT   Final Result   IMPRESSION: Multiple small lacunar infarcts. Left occipital and parietal   infarcts are probably acute. Right carli infarct is likely subacute. No   evidence of hemorrhage. CT HEAD WO CONT   Final Result   IMPRESSION:   1. No evidence of acute intracranial abnormality. Chronic changes as above. Labs: Results:       Chemistry Recent Labs     02/02/20 0519 02/01/20 0517 01/31/20  1841   GLU 98 82 90    142 138   K 5.0 5.1 5.9*   * 114* 110*   CO2 21 23 20*   BUN 32* 29* 33*   CREA 1.65* 1.61* 1.71*   CA 9.1 9.6 9.3   AGAP 7 5* 8   AP  --   --  109   TP  --   --  7.2   ALB  --   --  3.7   GLOB  --   --  3.5   AGRAT  --   --  1.1*      CBC w/Diff Recent Labs     02/02/20 0519 02/01/20 0517 01/31/20  1841   WBC 7.0 7.3 8.9   RBC 3.63* 3.73* 4.06*   HGB 12.0* 12.3* 13.6   HCT 37.3* 38.8* 42.7   * 133* 158   GRANS  --   --  61   LYMPH  --   --  22   EOS  --   --  6      Cardiac Enzymes No results for input(s): CPK, CKND1, TOMAS in the last 72 hours. No lab exists for component: CKRMB, TROIP   Coagulation No results for input(s): PTP, INR, APTT, INREXT in the last 72 hours. Lipid Panel Lab Results   Component Value Date/Time    Cholesterol, total 131 02/01/2020 05:17 AM    HDL Cholesterol 45 02/01/2020 05:17 AM    LDL, calculated 62.2 02/01/2020 05:17 AM    VLDL, calculated 23.8 (H) 02/01/2020 05:17 AM    Triglyceride 119 02/01/2020 05:17 AM    CHOL/HDL Ratio 2.9 02/01/2020 05:17 AM      BNP No results for input(s): BNPP in the last 72 hours.    Liver Enzymes Recent Labs     01/31/20  1841   TP 7.2   ALB 3.7      SGOT 30      Thyroid Studies Lab Results Component Value Date/Time    TSH 3.920 11/27/2019 08:51 AM            Discharge Exam:  Visit Vitals  /61 (BP 1 Location: Left arm, BP Patient Position: At rest)   Pulse 73   Temp 97.7 °F (36.5 °C)   Resp 18   SpO2 97%     General appearance: alert, cooperative, no distress, appears stated age   Lungs: clear to auscultation bilaterally  Heart: regular rate and rhythm, S1, S2 normal, no murmur, click, rub or gallop  Abdomen: soft, non-tender. Bowel sounds normal. No masses,  no organomegaly  Extremities: no cyanosis or edema  Neurologic: Grossly normal    Disposition: home  Discharge Condition: stable  Patient Instructions:   Current Discharge Medication List      START taking these medications    Details   atorvastatin (LIPITOR) 40 mg tablet Take 1 Tab by mouth nightly. Qty: 30 Tab, Refills: 0      aspirin delayed-release 81 mg tablet Take 1 Tab by mouth daily. Qty: 30 Tab, Refills: 0      apixaban (ELIQUIS) 5 mg tablet Take 1 Tab by mouth two (2) times a day. Qty: 60 Tab, Refills: 0         CONTINUE these medications which have NOT CHANGED    Details   allopurinol (ZYLOPRIM) 300 mg tablet Take 1 Tab by mouth daily. Qty: 90 Tab, Refills: 1    Associated Diagnoses: Chronic idiopathic gout involving toe of right foot without tophus      amLODIPine (NORVASC) 5 mg tablet Take 1 Tab by mouth daily. Qty: 90 Tab, Refills: 1    Associated Diagnoses: Essential hypertension      benazepril (LOTENSIN) 40 mg tablet Take 1 Tab by mouth daily. Qty: 90 Tab, Refills: 1    Associated Diagnoses: Essential hypertension      finasteride (PROSCAR) 5 mg tablet Take 1 Tab by mouth daily. Qty: 90 Tab, Refills: 1    Associated Diagnoses: Urinary retention      fluticasone propionate (FLONASE) 50 mcg/actuation nasal spray 2 Sprays by Both Nostrils route daily.   Qty: 3 Bottle, Refills: 1    Associated Diagnoses: Eustachian tube dysfunction, right; Impacted cerumen of right ear      pantoprazole (PROTONIX) 40 mg tablet Take 1 Tab by mouth daily. Qty: 90 Tab, Refills: 1    Associated Diagnoses: Other dysphagia      tamsulosin (FLOMAX) 0.4 mg capsule Take 1 Cap by mouth daily (after dinner). Qty: 90 Cap, Refills: 1    Associated Diagnoses: Urinary retention      acetaminophen (TYLENOL EXTRA STRENGTH) 500 mg tablet Take 1,000 mg by mouth nightly. STOP taking these medications       red yeast rice extract 600 mg cap Comments:   Reason for Stopping:               Activity: PT/OT per Home Health  Diet: Resume previous diet    Follow-up:     Follow-up Appointments   Procedures    FOLLOW UP VISIT Appointment in: One Week PCP     PCP     Standing Status:   Standing     Number of Occurrences:   1     Order Specific Question:   Appointment in     Answer:    One Week           Time spent to discharge patient 35 minutes  Signed:  Jody Levy MD  2/2/2020  12:44 PM

## 2020-02-02 NOTE — CONSULTS
Admit Date: 1/31/2020  Referring Physician: Dr. Osborne Schools Making/Plan/Recommend: Medical chart reviewed. Patient seen and examined. Briefly, this is an 79 YO with PMH of prostate CA, CKD stage 3, AV stenosis, PUD, HTN who was admitted on 1/31 secondary to L sided weakness and ataxia with MRI + for multiple lacunar infarcts. In the left occipital/parietal and right carli areas. Seen by therapy with current level of function: ADLs - modified independence, ambulation 500' without AD and SBA. Cleared by ST for regular/thin liquid diet. Pt lives with his daughter in an one level home with basement. PLOF: independent and still drives. Functionally, patient is too advanced for acute rehabilitation. Agree with continuing home health therapy. Thank you for the opportunity to participate in the care of this patient.

## 2020-02-02 NOTE — PROGRESS NOTES
Discharge instructions reviewed, prescriptions given to pt.  Pt taken down via wheelchair, home with daughter

## 2020-02-02 NOTE — PROGRESS NOTES
Neurology Daily Progress Note     Assessment:     Acute ischemic stroke, multiple vascular territories, likely cardio embolic etiology. CT of head negative for acute intracranial abnormalities. No CTA or MRA given patient's renal function. TTE completed in 8/19 demonstrated EF 60-65%, LA and RA mild dilation, AV sclerosis with reduced leaflet excursion, mild AV stenosis. Carotid US negative for high grade stenosis. TTE EF 55-60%, negative for PFO or atrial enlargement; AV sclerosis with reduced leaflet excursion with mild stenosis. ESR and CRP negative. Continue secondary stroke prevention, recommend starting on Eliquis 5mg po BID. No additional work up is needed at this time. Patient can follow up with Dr. Laila Byers in the outpatient clinic after discharge. Will sign off. Plan:     · Continue ASA 81 mg daily   · Recommend starting on Eliquis 5mg po BID. · Continue high intensity statin   · Neurochecks Q4H  · Telemetry  · PT/OT/ST  · DVT prophylaxis   · BP management - normotensive, with long-term goal <130/80  · Smoking cessation if applicable   · Diabetes education if applicable   · Depression Screening prior to discharge      Subjective: Interval history:    Patient is doing well. Denies dizziness or weakness. Carotid US negative for high grade stenosis. TTE EF 55-60%, negative for PFO or atrial enlargement; AV sclerosis with reduced leaflet excursion with mild stenosis. ESR and CRP negative. History:    Blanquita Dukes is a 80 y.o. male who is being seen for stroke. Review of systems negative with exception of pertinent positives and negatives noted above.        Objective:     Vitals:    02/02/20 0400 02/02/20 0800 02/02/20 0815 02/02/20 1200   BP: 111/66 106/64 98/58 105/61   Pulse: 61 (!) 59  73   Resp: 18 18  18   Temp: 97.9 °F (36.6 °C) 97.6 °F (36.4 °C)  97.7 °F (36.5 °C)   SpO2: 92% 93%  97%          Current Facility-Administered Medications:     lip protectant (BLISTEX) ointment 1 Each, 1 Each, Topical, PRN, Jeanie Ortega, , 1 Each at 02/01/20 2229    amLODIPine (NORVASC) tablet 5 mg, 5 mg, Oral, DAILY, Daily Ramirez MD, Stopped at 02/02/20 0810    lisinopril (PRINIVIL, ZESTRIL) tablet 40 mg, 40 mg, Oral, DAILY, Daily Ramirez MD, Stopped at 02/02/20 0810    finasteride (PROSCAR) tablet 5 mg, 5 mg, Oral, DAILY, Yolanda TREVIÑO MD, 5 mg at 02/02/20 0810    fluticasone propionate (FLONASE) 50 mcg/actuation nasal spray 2 Spray, 2 Spray, Both Nostrils, DAILY, Yolanda TREVIÑO MD, 2 [place] at 02/01/20 0910    pantoprazole (PROTONIX) tablet 40 mg, 40 mg, Oral, DAILY, Yolanda TREVIÑO MD, 40 mg at 02/02/20 0810    tamsulosin (FLOMAX) capsule 0.4 mg, 0.4 mg, Oral, PCD, Yolanda TREVIÑO MD, 0.4 mg at 02/01/20 1801    sodium chloride (NS) flush 5-40 mL, 5-40 mL, IntraVENous, Q8H, Yolanda TREVIÑO MD, 10 mL at 02/02/20 0553    sodium chloride (NS) flush 5-40 mL, 5-40 mL, IntraVENous, PRN, Daily Ramirez MD    aspirin tablet 325 mg, 325 mg, Oral, DAILY, Yolanda TREVIÑO MD, 325 mg at 02/02/20 0810    atorvastatin (LIPITOR) tablet 40 mg, 40 mg, Oral, QHS, Yolanda TREVIÑO MD, 40 mg at 02/01/20 2058    heparin (porcine) injection 5,000 Units, 5,000 Units, SubCUTAneous, Q8H, Yolanda TREVIÑO MD, 5,000 Units at 02/02/20 3260    allopurinoL (ZYLOPRIM) tablet 150 mg, 150 mg, Oral, DAILY, Yolanda TREVIÑO MD, 150 mg at 02/02/20 6037    Recent Results (from the past 12 hour(s))   CBC W/O DIFF    Collection Time: 02/02/20  5:19 AM   Result Value Ref Range    WBC 7.0 4.3 - 11.1 K/uL    RBC 3.63 (L) 4.23 - 5.6 M/uL    HGB 12.0 (L) 13.6 - 17.2 g/dL    HCT 37.3 (L) 41.1 - 50.3 %    .8 (H) 79.6 - 97.8 FL    MCH 33.1 (H) 26.1 - 32.9 PG    MCHC 32.2 31.4 - 35.0 g/dL    RDW 13.7 11.9 - 14.6 %    PLATELET 728 (L) 526 - 450 K/uL    MPV 11.3 9.4 - 12.3 FL    ABSOLUTE NRBC 0.00 0.0 - 0.2 K/uL   METABOLIC PANEL, BASIC    Collection Time: 02/02/20  5:19 AM   Result Value Ref Range    Sodium 141 136 - 145 mmol/L    Potassium 5.0 3.5 - 5.1 mmol/L    Chloride 113 (H) 98 - 107 mmol/L    CO2 21 21 - 32 mmol/L    Anion gap 7 7 - 16 mmol/L    Glucose 98 65 - 100 mg/dL    BUN 32 (H) 8 - 23 MG/DL    Creatinine 1.65 (H) 0.8 - 1.5 MG/DL    GFR est AA 51 (L) >60 ml/min/1.73m2    GFR est non-AA 42 (L) >60 ml/min/1.73m2    Calcium 9.1 8.3 - 10.4 MG/DL     MRI Results (most recent):  Results from Hospital Encounter encounter on 01/31/20   MRI BRAIN WO CONT    Narrative MRI of the brain     INDICATION: Ataxia and left-sided weakness, history of prostate cancer    Standard MRI sequences were obtained through the brain in multiple planes  without IV contrast    FINDINGS:  Small areas of peripheral resected diffusion are also present in the left  occipital and superior parietal cortex. .  These appear more acute. There is no  associated hemorrhage. There is moderate chronic change in white matter. The  ventricles are normal in size. There are no extra-axial fluid collections. There are no intracranial masses. There are no bony lesions. The sinuses are  clear. Impression IMPRESSION: Multiple small lacunar infarcts. Left occipital and parietal  infarcts are probably acute. Right carli infarct is likely subacute. No  evidence of hemorrhage. Physical Exam:  General - Well developed, well nourished, in no apparent distress. Pleasant and conversent. HEENT - Normocephalic, atraumatic. Conjunctiva, tympanic membranes, and oropharynx are clear. Neck - Supple without masses, no bruits   Cardiovascular - Regular rate and rhythm. Normal S1, S2 with systolic murmur, no rubs, or gallops. Lungs - Clear to auscultation. Abdomen - Soft, nontender with normal bowel sounds. Extremities - Peripheral pulses intact. No edema and no rashes.      Neurological examination - Comprehension, attention, memory and reasoning are intact. Language and speech are mild dysarthria.    On cranial nerve examination, (II, III, IV, VI) pupils are equal, round, and reactive to light. Visual acuity is adequate. Visual fields are full to finger confrontation. Extraocular motility is normal. (V, VII) Face is symmetric and sensation is intact to light touch. (VIII) Hearing is intact. (IX, X) Palate and uvula elevate symmetrically. Voice is normal. (XI) Shoulder shrug is strong and equal bilaterally. (XII)Tongue is midline. Motor examination - There is normal muscle tone and bulk. Power is full throughout, with exception of LUE pronation. Muscle stretch reflexes are normoactive and there are no pathological reflexes present. Plantar response is flexor. Sensation is intact to light touch, pinprick, vibration and proprioception in all extremities. Cerebellar examination is normal finger/nose and heel/matta. Signed By: Jo Boxer, NP     February 2, 2020    Patient seen and examined.   He appears at neurologic baseline  Patient was not on any platelet inhibitor or anticoagulant therapy may therefore be reasonable to just add an aspirin at this point time but in the event of any recurrence particularly if continues to have evidence of embolic phenomenon on MRI will be appropriate to add on oral anticoagulant    Cardiac work-up negative at this point however

## 2020-02-04 ENCOUNTER — PATIENT OUTREACH (OUTPATIENT)
Dept: CASE MANAGEMENT | Age: 85
End: 2020-02-04

## 2020-02-04 NOTE — PROGRESS NOTES
This note will not be viewable in [address] Transition of Care Discharge Follow-up Questionnaire   Date/Time of Call:    2/4/2020 10:15 AM   What was the patient hospitalized for? Acute CVA   Does the patient understand his/her diagnosis and/or treatment and what happened during the hospitalization? Yes, spoke with patient and he states he has an understanding of his recent hospitalization, diagnosis and treatment. Did the patient receive discharge instructions? Yes    CM Assessed Risk for Readmission:         Patient stated Risk for Readmission:        Medium risk for readmission. Review any discharge instructions (see discharge instructions/AVS in [facility]). Ask patient if they understand these. Do they have any questions? Discharge instructions reviewed with patient and he has an understanding. Were home services ordered (nursing, PT, OT, ST, etc.)? Yes PT/OT    If so, has the first visit occurred? If not, why? (Assist with coordination of services if necessary.)    No, patient states that PCP did not want therapy to begin until he sees the patient on 2/7/2020. Was any DME ordered? No patient has his own RW and Patt Encarnacion    If so, has it been received? If not, why?  (Assist patient in obtaining DME orders &/or equipment if necessary.) N/A   Completed review of all medications (new, discontinued, and continued meds per the d/c instructions and medication tab in [facility]) START taking:  apixaban 2.5 mg tablet (ELIQUIS)  aspirin delayed-release 81 mg tablet  atorvastatin 40 mg tablet (LIPITOR)  STOP taking:  red yeast rice extract 600 mg Cap   Were all new prescriptions filled? If not, why?  (Assist patient in obtaining medications if necessary  escalate for CCM &/or SW if ongoing issues are verbalized by pt or anticipated)    Yes    Does the patient understand the purpose and dosing instructions for all medications?   (If patient has questions, provide explanation and education.)    Medications reviewed with patient and he verbalizes an understanding. Does the patient have any problems in performing ADLs? (If patient is unable to perform ADLs  what is the limiting factor(s)? Do they have a support system that can assist? If no support system is present, discuss possible assistance that they may be able to obtain. Escalate for CCM/SW if ongoing issues are verbalized by pt or anticipated)    Patient has no difficulty with ADLs. Does the patient have all follow-up appointments scheduled? 7 day f/up with PCP?   (f/up with PCP may be w/in 14 days if patient has a f/up with their specialist w/in 7 days)     7-14 day f/up with specialist?   (or per discharge instructions)     If f/up has not been made  what actions has the care coordinator made to accomplish this? Has transportation been arranged? FU with PCP Dr. Nae Oro scheduled for 2/7/2020. All other appointments scheduled appropriately. Discussed the importance of FU appointments with patient and he verbalized understanding. No transportation needs at this time. Any other questions or concerns expressed by the patient? Schedule next appointment with MIHAELA ATKINS Coordinator or refer to RN Case Manager/ per the workflow guidelines. When is care coordinators next follow-up call scheduled? If referred for CCM  what RN care manager       Was the referral assigned?                             7-14 days    [facility] Call Completed By: Vinod Dominguez LPN Care Coordinator

## 2020-02-14 ENCOUNTER — PATIENT OUTREACH (OUTPATIENT)
Dept: CASE MANAGEMENT | Age: 85
End: 2020-02-14

## 2020-02-14 NOTE — PROGRESS NOTES
This note will not be viewable in [address] Transitions of Care  Follow up Outreach Note   Outreach type Phone call: Spoke with patient     Home visit:   Date/Time of Outreach: 2/14/2020 12:00  PM      Has patient attended PCP or specialist follow-up appointments since last contact? What was outcome of appointment? When is next follow-up scheduled? FU with PCP Dr. Josefina Brantley completed on 2/7/2020. Next appointment scheduled for 3/27/2020. New patient appointment at [address] [place] [address] [facility] scheduled for 2/25/2020. Review medications. Any medication changes since last outreach? Does patient have any questions or issues related to their medications? Medications reviewed with patient. He verbalizes an understanding of medications. Home health active? If yes  any issue? Progress? No patient has a referral for [facility] but the patient says that no one has called to set up an appointment. CC called [facility] to clarify. They stated that the patient had to be cleared by his PCP prior to having home health services. CC called PCP office and left a message for Dr. Josefina Brantley for a return call. Referrals needed?  (CM, SW, HH, etc. )     No    Other issues/Miscellaneous? (Transportation, access to meals, ability to perform ADLs, adequate caregiver support, etc.)    Next Outreach Scheduled? Graduation from program?    15-30 days          Next Steps/Goals (if applicable):             Outreach completed by:    Eddie Viveros LPN Care Coordinator

## 2020-02-17 ENCOUNTER — APPOINTMENT (OUTPATIENT)
Dept: MRI IMAGING | Age: 85
DRG: 069 | End: 2020-02-17
Attending: FAMILY MEDICINE
Payer: MEDICARE

## 2020-02-17 ENCOUNTER — APPOINTMENT (OUTPATIENT)
Dept: CT IMAGING | Age: 85
DRG: 069 | End: 2020-02-17
Attending: EMERGENCY MEDICINE
Payer: MEDICARE

## 2020-02-17 ENCOUNTER — HOSPITAL ENCOUNTER (INPATIENT)
Age: 85
LOS: 1 days | Discharge: HOME | DRG: 069 | End: 2020-02-18
Attending: EMERGENCY MEDICINE | Admitting: FAMILY MEDICINE
Payer: MEDICARE

## 2020-02-17 ENCOUNTER — APPOINTMENT (OUTPATIENT)
Dept: MRI IMAGING | Age: 85
DRG: 069 | End: 2020-02-17
Attending: NURSE PRACTITIONER
Payer: MEDICARE

## 2020-02-17 DIAGNOSIS — G45.9 TIA (TRANSIENT ISCHEMIC ATTACK): Primary | ICD-10-CM

## 2020-02-17 PROBLEM — R53.1 LEFT-SIDED WEAKNESS: Status: ACTIVE | Noted: 2020-02-17

## 2020-02-17 PROBLEM — R47.81 SLURRED SPEECH: Status: ACTIVE | Noted: 2020-02-17

## 2020-02-17 LAB
ALBUMIN SERPL-MCNC: 3.7 G/DL (ref 3.2–4.6)
ALBUMIN/GLOB SERPL: 1.2 {RATIO} (ref 1.2–3.5)
ALP SERPL-CCNC: 107 U/L (ref 50–136)
ALT SERPL-CCNC: 45 U/L (ref 12–65)
ANION GAP SERPL CALC-SCNC: 5 MMOL/L (ref 7–16)
ANION GAP SERPL CALC-SCNC: 6 MMOL/L (ref 7–16)
AST SERPL-CCNC: 28 U/L (ref 15–37)
ATRIAL RATE: 76 BPM
BASOPHILS # BLD: 0.1 K/UL (ref 0–0.2)
BASOPHILS # BLD: 0.1 K/UL (ref 0–0.2)
BASOPHILS NFR BLD: 1 % (ref 0–2)
BASOPHILS NFR BLD: 1 % (ref 0–2)
BILIRUB SERPL-MCNC: 0.5 MG/DL (ref 0.2–1.1)
BUN SERPL-MCNC: 33 MG/DL (ref 8–23)
BUN SERPL-MCNC: 36 MG/DL (ref 8–23)
CALCIUM SERPL-MCNC: 9.5 MG/DL (ref 8.3–10.4)
CALCIUM SERPL-MCNC: 9.7 MG/DL (ref 8.3–10.4)
CALCULATED P AXIS, ECG09: 65 DEGREES
CALCULATED R AXIS, ECG10: -66 DEGREES
CALCULATED T AXIS, ECG11: 61 DEGREES
CHLORIDE SERPL-SCNC: 115 MMOL/L (ref 98–107)
CHLORIDE SERPL-SCNC: 116 MMOL/L (ref 98–107)
CO2 SERPL-SCNC: 21 MMOL/L (ref 21–32)
CO2 SERPL-SCNC: 23 MMOL/L (ref 21–32)
CREAT SERPL-MCNC: 1.8 MG/DL (ref 0.8–1.5)
CREAT SERPL-MCNC: 1.95 MG/DL (ref 0.8–1.5)
DIAGNOSIS, 93000: NORMAL
DIFFERENTIAL METHOD BLD: ABNORMAL
DIFFERENTIAL METHOD BLD: ABNORMAL
EOSINOPHIL # BLD: 0.4 K/UL (ref 0–0.8)
EOSINOPHIL # BLD: 0.6 K/UL (ref 0–0.8)
EOSINOPHIL NFR BLD: 5 % (ref 0.5–7.8)
EOSINOPHIL NFR BLD: 7 % (ref 0.5–7.8)
ERYTHROCYTE [DISTWIDTH] IN BLOOD BY AUTOMATED COUNT: 14.1 % (ref 11.9–14.6)
ERYTHROCYTE [DISTWIDTH] IN BLOOD BY AUTOMATED COUNT: 14.2 % (ref 11.9–14.6)
GLOBULIN SER CALC-MCNC: 3.2 G/DL (ref 2.3–3.5)
GLUCOSE BLD STRIP.AUTO-MCNC: 102 MG/DL (ref 65–100)
GLUCOSE SERPL-MCNC: 109 MG/DL (ref 65–100)
GLUCOSE SERPL-MCNC: 109 MG/DL (ref 65–100)
HCT VFR BLD AUTO: 41.1 % (ref 41.1–50.3)
HCT VFR BLD AUTO: 42.3 % (ref 41.1–50.3)
HGB BLD-MCNC: 13 G/DL (ref 13.6–17.2)
HGB BLD-MCNC: 13.2 G/DL (ref 13.6–17.2)
IMM GRANULOCYTES # BLD AUTO: 0 K/UL (ref 0–0.5)
IMM GRANULOCYTES # BLD AUTO: 0 K/UL (ref 0–0.5)
IMM GRANULOCYTES NFR BLD AUTO: 0 % (ref 0–5)
IMM GRANULOCYTES NFR BLD AUTO: 1 % (ref 0–5)
INR PPP: 1
LYMPHOCYTES # BLD: 1.7 K/UL (ref 0.5–4.6)
LYMPHOCYTES # BLD: 2.2 K/UL (ref 0.5–4.6)
LYMPHOCYTES NFR BLD: 20 % (ref 13–44)
LYMPHOCYTES NFR BLD: 26 % (ref 13–44)
MCH RBC QN AUTO: 33.1 PG (ref 26.1–32.9)
MCH RBC QN AUTO: 33.3 PG (ref 26.1–32.9)
MCHC RBC AUTO-ENTMCNC: 31.2 G/DL (ref 31.4–35)
MCHC RBC AUTO-ENTMCNC: 31.6 G/DL (ref 31.4–35)
MCV RBC AUTO: 105.4 FL (ref 79.6–97.8)
MCV RBC AUTO: 106 FL (ref 79.6–97.8)
MONOCYTES # BLD: 0.6 K/UL (ref 0.1–1.3)
MONOCYTES # BLD: 0.6 K/UL (ref 0.1–1.3)
MONOCYTES NFR BLD: 7 % (ref 4–12)
MONOCYTES NFR BLD: 7 % (ref 4–12)
NEUTS SEG # BLD: 4.9 K/UL (ref 1.7–8.2)
NEUTS SEG # BLD: 5.5 K/UL (ref 1.7–8.2)
NEUTS SEG NFR BLD: 58 % (ref 43–78)
NEUTS SEG NFR BLD: 67 % (ref 43–78)
NRBC # BLD: 0 K/UL (ref 0–0.2)
NRBC # BLD: 0 K/UL (ref 0–0.2)
P-R INTERVAL, ECG05: 152 MS
PLATELET # BLD AUTO: 151 K/UL (ref 150–450)
PLATELET # BLD AUTO: 161 K/UL (ref 150–450)
PMV BLD AUTO: 11.3 FL (ref 9.4–12.3)
PMV BLD AUTO: 11.4 FL (ref 9.4–12.3)
POTASSIUM SERPL-SCNC: 4.8 MMOL/L (ref 3.5–5.1)
POTASSIUM SERPL-SCNC: 4.9 MMOL/L (ref 3.5–5.1)
PROT SERPL-MCNC: 6.9 G/DL (ref 6.3–8.2)
PROTHROMBIN TIME: 13 SEC (ref 12–14.7)
Q-T INTERVAL, ECG07: 376 MS
QRS DURATION, ECG06: 114 MS
QTC CALCULATION (BEZET), ECG08: 423 MS
RBC # BLD AUTO: 3.9 M/UL (ref 4.23–5.6)
RBC # BLD AUTO: 3.99 M/UL (ref 4.23–5.6)
SODIUM SERPL-SCNC: 143 MMOL/L (ref 136–145)
SODIUM SERPL-SCNC: 143 MMOL/L (ref 136–145)
VENTRICULAR RATE, ECG03: 76 BPM
WBC # BLD AUTO: 8.2 K/UL (ref 4.3–11.1)
WBC # BLD AUTO: 8.4 K/UL (ref 4.3–11.1)

## 2020-02-17 PROCEDURE — 80053 COMPREHEN METABOLIC PANEL: CPT

## 2020-02-17 PROCEDURE — 99218 HC RM OBSERVATION: CPT

## 2020-02-17 PROCEDURE — 85025 COMPLETE CBC W/AUTO DIFF WBC: CPT

## 2020-02-17 PROCEDURE — 82962 GLUCOSE BLOOD TEST: CPT

## 2020-02-17 PROCEDURE — 86580 TB INTRADERMAL TEST: CPT | Performed by: FAMILY MEDICINE

## 2020-02-17 PROCEDURE — 70551 MRI BRAIN STEM W/O DYE: CPT

## 2020-02-17 PROCEDURE — 85610 PROTHROMBIN TIME: CPT

## 2020-02-17 PROCEDURE — 70450 CT HEAD/BRAIN W/O DYE: CPT

## 2020-02-17 PROCEDURE — 74011250637 HC RX REV CODE- 250/637: Performed by: FAMILY MEDICINE

## 2020-02-17 PROCEDURE — 94760 N-INVAS EAR/PLS OXIMETRY 1: CPT

## 2020-02-17 PROCEDURE — 70544 MR ANGIOGRAPHY HEAD W/O DYE: CPT

## 2020-02-17 PROCEDURE — 74011250637 HC RX REV CODE- 250/637: Performed by: EMERGENCY MEDICINE

## 2020-02-17 PROCEDURE — 92610 EVALUATE SWALLOWING FUNCTION: CPT

## 2020-02-17 PROCEDURE — 81003 URINALYSIS AUTO W/O SCOPE: CPT

## 2020-02-17 PROCEDURE — 36415 COLL VENOUS BLD VENIPUNCTURE: CPT

## 2020-02-17 PROCEDURE — 93005 ELECTROCARDIOGRAM TRACING: CPT | Performed by: EMERGENCY MEDICINE

## 2020-02-17 PROCEDURE — 99284 EMERGENCY DEPT VISIT MOD MDM: CPT

## 2020-02-17 PROCEDURE — 74011000302 HC RX REV CODE- 302: Performed by: FAMILY MEDICINE

## 2020-02-17 RX ORDER — NALOXONE HYDROCHLORIDE 0.4 MG/ML
0.4 INJECTION, SOLUTION INTRAMUSCULAR; INTRAVENOUS; SUBCUTANEOUS AS NEEDED
Status: DISCONTINUED | OUTPATIENT
Start: 2020-02-17 | End: 2020-02-18 | Stop reason: HOSPADM

## 2020-02-17 RX ORDER — TAMSULOSIN HYDROCHLORIDE 0.4 MG/1
0.4 CAPSULE ORAL
Status: DISCONTINUED | OUTPATIENT
Start: 2020-02-17 | End: 2020-02-18 | Stop reason: HOSPADM

## 2020-02-17 RX ORDER — ASPIRIN 81 MG/1
81 TABLET ORAL DAILY
Status: DISCONTINUED | OUTPATIENT
Start: 2020-02-18 | End: 2020-02-17 | Stop reason: SDUPTHER

## 2020-02-17 RX ORDER — POLYETHYLENE GLYCOL 3350 17 G/17G
17 POWDER, FOR SOLUTION ORAL
Status: DISCONTINUED | OUTPATIENT
Start: 2020-02-17 | End: 2020-02-18 | Stop reason: HOSPADM

## 2020-02-17 RX ORDER — ATORVASTATIN CALCIUM 40 MG/1
40 TABLET, FILM COATED ORAL
Status: DISCONTINUED | OUTPATIENT
Start: 2020-02-17 | End: 2020-02-18 | Stop reason: HOSPADM

## 2020-02-17 RX ORDER — LABETALOL HYDROCHLORIDE 5 MG/ML
5 INJECTION, SOLUTION INTRAVENOUS
Status: DISCONTINUED | OUTPATIENT
Start: 2020-02-17 | End: 2020-02-18 | Stop reason: HOSPADM

## 2020-02-17 RX ORDER — ONDANSETRON 2 MG/ML
4 INJECTION INTRAMUSCULAR; INTRAVENOUS
Status: DISCONTINUED | OUTPATIENT
Start: 2020-02-17 | End: 2020-02-18 | Stop reason: HOSPADM

## 2020-02-17 RX ORDER — GUAIFENESIN 100 MG/5ML
81 LIQUID (ML) ORAL DAILY
Status: DISCONTINUED | OUTPATIENT
Start: 2020-02-17 | End: 2020-02-18 | Stop reason: HOSPADM

## 2020-02-17 RX ORDER — ENOXAPARIN SODIUM 100 MG/ML
1 INJECTION SUBCUTANEOUS
Status: DISCONTINUED | OUTPATIENT
Start: 2020-02-17 | End: 2020-02-17 | Stop reason: ALTCHOICE

## 2020-02-17 RX ORDER — ALLOPURINOL 300 MG/1
300 TABLET ORAL DAILY
Status: DISCONTINUED | OUTPATIENT
Start: 2020-02-18 | End: 2020-02-18 | Stop reason: HOSPADM

## 2020-02-17 RX ORDER — PANTOPRAZOLE SODIUM 40 MG/1
40 TABLET, DELAYED RELEASE ORAL DAILY
Status: DISCONTINUED | OUTPATIENT
Start: 2020-02-18 | End: 2020-02-18 | Stop reason: HOSPADM

## 2020-02-17 RX ORDER — ACETAMINOPHEN 325 MG/1
650 TABLET ORAL
Status: DISCONTINUED | OUTPATIENT
Start: 2020-02-17 | End: 2020-02-18 | Stop reason: HOSPADM

## 2020-02-17 RX ORDER — FINASTERIDE 5 MG/1
5 TABLET, FILM COATED ORAL DAILY
Status: DISCONTINUED | OUTPATIENT
Start: 2020-02-18 | End: 2020-02-18 | Stop reason: HOSPADM

## 2020-02-17 RX ADMIN — ATORVASTATIN CALCIUM 40 MG: 40 TABLET, FILM COATED ORAL at 21:25

## 2020-02-17 RX ADMIN — Medication 1 EACH: at 17:45

## 2020-02-17 RX ADMIN — APIXABAN 10 MG: 5 TABLET, FILM COATED ORAL at 10:40

## 2020-02-17 RX ADMIN — APIXABAN 2.5 MG: 2.5 TABLET, FILM COATED ORAL at 21:25

## 2020-02-17 RX ADMIN — TUBERCULIN PURIFIED PROTEIN DERIVATIVE 5 UNITS: 5 INJECTION, SOLUTION INTRADERMAL at 16:27

## 2020-02-17 RX ADMIN — ASPIRIN 81 MG 81 MG: 81 TABLET ORAL at 12:52

## 2020-02-17 RX ADMIN — TAMSULOSIN HYDROCHLORIDE 0.4 MG: 0.4 CAPSULE ORAL at 17:45

## 2020-02-17 RX ADMIN — ACETAMINOPHEN 650 MG: 325 TABLET, FILM COATED ORAL at 17:45

## 2020-02-17 NOTE — PROGRESS NOTES
Patient with recent admit and discharge from [facility] (1-31-20 to 2-2-20) with referral to [facility]. PCP - Dr. Castro [facility]) - last office visit noted on 2-7-20. Patient also followed by [facility].

## 2020-02-17 NOTE — H&P
Hospitalist Admission History and Physical     NAME:  Epifania Hatchet   Age:  80 y.o.  :   1932   MRN:   [id number]  PCP: Keith Perry MD  Consulting MD:  Treatment Team: Attending Provider: Miryam Potter MD; Primary Nurse: Kala Olea    Chief Complaint   Patient presents with    Extremity Weakness         HPI:   Patient is a 80 y.o. male who presented to the ED for cc tingling in his left arm and leg. Also admits to difficulty with ambulation for the past one hour along with slurred speech. Hx of HLD, HTN, BPH, CKD stage 3, AV stenosis, prostate CA s/p radiation, and CVAs in the past. Patient with recent discharge on 20 for cc slurred speech in which he was found to have CVA thought to be cardioembolic etiology. Eliquis, ASA, and statin started. Patient states he stopped taking his Eliquis. Neurology has seen and recommended starting back his Eliquis. Vitals - stable    Labs- Benign    CT head no acute process. Past Medical History:   Diagnosis Date    Cancer of prostate ([address]     radiation tx     CKD (chronic kidney disease) stage 3, GFR 30-59 ml/min ([facility] 2012    Gout     Metabolic syndrome     Mixed hyperlipidemia 2012    Moderate aortic valve stenosis     Peptic ulcer disease 2012    Unspecified essential hypertension 2012    Urinary retention         Past Surgical History:   Procedure Laterality Date    HX APPENDECTOMY  1950    HX CHOLECYSTECTOMY      HX COLONOSCOPY      HX GASTRECTOMY      V&A,     HX TURP  ,     Dx CA, followed by radiation Rx        Family History   Problem Relation Age of Onset    Dementia Father        Social History     Patient does not qualify to have social determinant information on file (likely too young).    Social History Narrative    Lives alone, , drives, two daughters he is close to, Maximino Craig [phone], Maida Harmon, close to both of them        Social History     Tobacco Use    Smoking status: Former Smoker     Packs/day: 1.00     Years: 10.00     Pack years: 10.00     Last attempt to quit: 1968     Years since quittin.1    Smokeless tobacco: Never Used    Tobacco comment: quit 60 years ago    Substance Use Topics    Alcohol use: No        Social History     Substance and Sexual Activity   Drug Use No         Allergies   Allergen Reactions    Neurontin [Gabapentin] Other (comments)     Dizzy. Pt reports that he's now taking the medicine at night before bed - no longer having issues and is continuing to take this med.  2016       Prior to Admission medications    Medication Sig Start Date End Date Taking? Authorizing Provider   atorvastatin (LIPITOR) 40 mg tablet Take 1 Tab by mouth nightly. 20   Muna Chisholm MD   aspirin delayed-release 81 mg tablet Take 1 Tab by mouth daily. 20   Ann Sadler MD   apixaban (ELIQUIS) 2.5 mg tablet Take 1 Tab by mouth two (2) times a day. 20   Ann Sadler MD   allopurinol (ZYLOPRIM) 300 mg tablet Take 1 Tab by mouth daily. 19   Muna Chisholm MD   amLODIPine (NORVASC) 5 mg tablet Take 1 Tab by mouth daily. 19   Muna Chisholm MD   benazepril (LOTENSIN) 40 mg tablet Take 1 Tab by mouth daily. 19   Muna Chisholm MD   finasteride (PROSCAR) 5 mg tablet Take 1 Tab by mouth daily. 19   Muna Chisholm MD   fluticasone propionate (FLONASE) 50 mcg/actuation nasal spray 2 Sprays by Both Nostrils route daily. 19   Muna Chisholm MD   pantoprazole (PROTONIX) 40 mg tablet Take 1 Tab by mouth daily. 19   Muna Chisholm MD   tamsulosin (FLOMAX) 0.4 mg capsule Take 1 Cap by mouth daily (after dinner). 19   Muna Chisholm MD   acetaminophen (TYLENOL EXTRA STRENGTH) 500 mg tablet Take 1,000 mg by mouth nightly.     Provider, Historical           Review of Systems    Constitutional:  NAD  Eyes:  no change in visual acuity, no photophobia  Ears, nose, mouth, throat, and face: no  Odynphagia, dysphagia, no thrush or exudate, negative for chronic sinus congestion, recurrent headaches  Respiratory: negative for SOB, hemoptysis or cough  Cardiovascular: negative for CP, palpitations, or PND  Gastrointestinal: negative for abdominal pain, no hematemesis, hematochezia or BRBPR  Genitourinary: no urgency, frequency, or dysuria, no nocturia  Integument/breast: negative for skin rash or skin lesions  Hematologic/lymphatic: negative for known bleeding disorder  Musculoskeletal:negative for joint pain or joint tenderness  Neurological:left sided weakness and difficulty with ambulation. Behavioral/Psych: negative for depression or chronic anxiety,   Endocrine: negative for polydyspia, polyuria or intolerance to heat or cold  Allergic/Immunologic: negative for chronic allergic rhinitis, or known connective tissue disorder      Objective:     Visit Vitals  /76 (BP 1 Location: Left arm, BP Patient Position: At rest)   Pulse 76   Temp 98.4 °F (36.9 °C)   Resp 16   Ht 5' 8\" (1.727 m)   Wt 104.3 kg (230 lb)   SpO2 98%   BMI 34.97 kg/m²        No intake/output data recorded. No intake/output data recorded.     Data Review:   Recent Results (from the past 24 hour(s))   GLUCOSE, POC    Collection Time: 02/17/20  9:28 AM   Result Value Ref Range    Glucose (POC) 102 (H) 65 - 100 mg/dL   CBC WITH AUTOMATED DIFF    Collection Time: 02/17/20  9:29 AM   Result Value Ref Range    WBC 8.4 4.3 - 11.1 K/uL    RBC 3.99 (L) 4.23 - 5.6 M/uL    HGB 13.2 (L) 13.6 - 17.2 g/dL    HCT 42.3 41.1 - 50.3 %    .0 (H) 79.6 - 97.8 FL    MCH 33.1 (H) 26.1 - 32.9 PG    MCHC 31.2 (L) 31.4 - 35.0 g/dL    RDW 14.1 11.9 - 14.6 %    PLATELET 085 660 - 895 K/uL    MPV 11.3 9.4 - 12.3 FL    ABSOLUTE NRBC 0.00 0.0 - 0.2 K/uL    DF AUTOMATED      NEUTROPHILS 58 43 - 78 %    LYMPHOCYTES 26 13 - 44 %    MONOCYTES 7 4.0 - 12.0 %    EOSINOPHILS 7 0.5 - 7.8 %    BASOPHILS 1 0.0 - 2.0 %    IMMATURE GRANULOCYTES 0 0.0 - 5.0 %    ABS. NEUTROPHILS 4.9 1.7 - 8.2 K/UL    ABS. LYMPHOCYTES 2.2 0.5 - 4.6 K/UL    ABS. MONOCYTES 0.6 0.1 - 1.3 K/UL    ABS. EOSINOPHILS 0.6 0.0 - 0.8 K/UL    ABS. BASOPHILS 0.1 0.0 - 0.2 K/UL    ABS. IMM. GRANS. 0.0 0.0 - 0.5 K/UL   METABOLIC PANEL, COMPREHENSIVE    Collection Time: 02/17/20  9:29 AM   Result Value Ref Range    Sodium 143 136 - 145 mmol/L    Potassium 4.9 3.5 - 5.1 mmol/L    Chloride 115 (H) 98 - 107 mmol/L    CO2 23 21 - 32 mmol/L    Anion gap 5 (L) 7 - 16 mmol/L    Glucose 109 (H) 65 - 100 mg/dL    BUN 36 (H) 8 - 23 MG/DL    Creatinine 1.95 (H) 0.8 - 1.5 MG/DL    GFR est AA 42 (L) >60 ml/min/1.73m2    GFR est non-AA 35 (L) >60 ml/min/1.73m2    Calcium 9.7 8.3 - 10.4 MG/DL    Bilirubin, total 0.5 0.2 - 1.1 MG/DL    ALT (SGPT) 45 12 - 65 U/L    AST (SGOT) 28 15 - 37 U/L    Alk. phosphatase 107 50 - 136 U/L    Protein, total 6.9 6.3 - 8.2 g/dL    Albumin 3.7 3.2 - 4.6 g/dL    Globulin 3.2 2.3 - 3.5 g/dL    A-G Ratio 1.2 1.2 - 3.5     PROTHROMBIN TIME + INR    Collection Time: 02/17/20  9:29 AM   Result Value Ref Range    Prothrombin time 13.0 12.0 - 14.7 sec    INR 1.0     EKG, 12 LEAD, INITIAL    Collection Time: 02/17/20  9:38 AM   Result Value Ref Range    Ventricular Rate 76 BPM    Atrial Rate 76 BPM    P-R Interval 152 ms    QRS Duration 114 ms    Q-T Interval 376 ms    QTC Calculation (Bezet) 423 ms    Calculated P Axis 65 degrees    Calculated R Axis -66 degrees    Calculated T Axis 61 degrees    Diagnosis       !! AGE AND GENDER SPECIFIC ECG ANALYSIS !!   Normal sinus rhythm  Left anterior fascicular block  ST abnormality, possible digitalis effect  Abnormal ECG  When compared with ECG of 31-JAN-2020 18:31,  ST no longer depressed in Inferior leads  Non-specific change in ST segment in Lateral leads  T wave inversion no longer evident in Inferior leads  Nonspecific T wave abnormality, improved in Lateral leads         Physical Exam:     General: Alert, cooperative, no distress, appears stated age. Eyes:  Conjunctivae/corneas clear. Ears:  Normal TMs and external ear canals both ears. Nose: Nares normal. Septum midline. Mouth/Throat: Lips, mucosa, and tongue normal. Teeth and gums normal. No facial droop or tongue deviation. Neck:  no JVD. Back:   deferred   Lungs:   Clear to auscultation bilaterally. Heart:  Regular rate and rhythm, S1, S2 normal, blowing systolic murmur best heard at right 2nd intercostal space   Abdomen:   Soft, non-tender. Bowel sounds normal. No masses,  No organomegaly. Extremities: Chronic trace edema. 5/5 strength to right and left UE and LE bilaterally    Pulses: 2+ and symmetric all extremities. Skin: Skin color, texture, turgor normal. No rashes or lesions   Lymph nodes: Cervical, supraclavicular, and axillary nodes normal.   Neurologic: CNII-XII intact. Assessment and Plan     Principal Problem:    Slurred speech (2/17/2020)    Active Problems:    Hypertension (8/18/2012)      Mixed hyperlipidemia (8/18/2012)      Overview: Calcium score of 24      Chronic kidney disease, stage III (moderate) (HCC) (8/18/2012)      CVA (cerebral vascular accident) ([address] (1/31/2020)      Left-sided weakness (2/17/2020)    Slurred speech, left limb weakness - Suspecting recurrent CVA due to noncompliance of medication. Restart Eliquis but at 2.5mg BID, ASA, and statin. Order MRI. Recent admission earlier this month showed cholesterol 131, LDL 62.2. A1C 5.3. No significant stenosis on carotid duplex. No shunting seen on ECHO, mild aortic stenosis. PT/OT. Consult neurology. No hx of a fib. May need 30 day event monitor. Right atrium size normal.     HTN- allow permissive HTN today. PRN labetalol    CKD stage III - stable    Hx of CVA- medications as above.      Hx of prostate cancer - Followed by [facility] urology     DVT prophylaxis - Eliquis  Signed By: Jerry Bey DO   February 17, 2020

## 2020-02-17 NOTE — PROGRESS NOTES
Problem: Falls - Risk of  Goal: *Absence of Falls  Description  Document Toby Crenshaw Fall Risk and appropriate interventions in the flowsheet.   Outcome: Progressing Towards Goal  Note: Fall Risk Interventions:  Mobility Interventions: Bed/chair exit alarm, Communicate number of staff needed for ambulation/transfer, Patient to call before getting OOB         Medication Interventions: Bed/chair exit alarm, Patient to call before getting OOB, Teach patient to arise slowly    Elimination Interventions: Bed/chair exit alarm, Call light in reach, Patient to call for help with toileting needs, Stay With Me (per policy), Toilet paper/wipes in reach, Toileting schedule/hourly rounds

## 2020-02-17 NOTE — PROGRESS NOTES
SPEECH LANGUAGE PATHOLOGY: DYSPHAGIA- Initial Assessment    NAME/AGE/GENDER: Doris Cristina is a 80 y.o. male  DATE: 2/17/2020  PRIMARY DIAGNOSIS: Left-sided weakness [R53.1]  Slurred speech [R47.81]      ICD-10: Treatment Diagnosis: R13.12 Dysphagia, Oropharyngeal Phase    INTERDISCIPLINARY COLLABORATION: Registered Nurse  PRECAUTIONS/ALLERGIES: Neurontin [gabapentin]       SUBJECTIVE   Patient seen for dysphagia evaluation while sitting on side of bed eating lunch. He reports mild slurred speech that has not fully resolved, but speech intelligibility 100% at conversational level. History of Present Injury/Illness: Mr. Cara Howard  has a past medical history of Cancer of prostate ([facility] (2001), CKD (chronic kidney disease) stage 3, GFR 30-59 ml/min ([facility] (8/18/2012), Gout (3/51/3539), Metabolic syndrome (5/67/8531), Mixed hyperlipidemia (8/18/2012), Moderate aortic valve stenosis, Peptic ulcer disease (8/18/2012), Unspecified essential hypertension (8/18/2012), and Urinary retention. He also has no past medical history of Difficult intubation, Malignant hyperthermia due to anesthesia, Nausea & vomiting, Pseudocholinesterase deficiency, or Unspecified adverse effect of anesthesia. Bobo Balbuena He also  has a past surgical history that includes hx appendectomy (1950); hx cholecystectomy; hx gastrectomy; hx colonoscopy (2002); and hx turp (2000, 2015). Problem List:  (Impairments causing functional limitations):  1. Oropharyngeal dysphagia- No symptoms identified  2.   3.    Previous Dysphagia: NONE REPORTED    Diet Prior to Evaluation: Regular/thin       Orientation:   Person  Place  Time  Situation    Pain: Pain Scale 1: Numeric (0 - 10)  Pain Intensity 1: 0       Cognitive-Linguistic Screen:   Speech Production:   o Speech 100% intelligible at the conversational level; however, patient reports mild slurring prior to admission.     Expressive Language:  o WNL  o Needs further assessment  o Communicating at conversational level without apparent word finding difficulties. Patient denies any changes to expressive language.  Receptive Language:  o WNL  o Needs further assessment  o Patient followed commands during oral motor assessment and responded to open ended questions without apparent difficulty.  Cognition:   o Needs further assessment  o No apparent deficits. Patient recalling details leading up to hospitalization without difficulty. OBJECTIVE   Oral Motor:   · Labial: Decreased rate, Decreased seal, Impaired coordination, Flaccid and Left droop  · Dentition: Intact  · Oral Hygiene: Adequate  · Lingual: No impairment     Swallow assessment:   Patient presented with thin liquid via cup and straw, puree, mixed, and solid consistencies. Appropriate oral prep with all textures. Timely swallow initiation, and single swallows upon palpation. Adequate oral clearing. No overt signs or symptoms of airway compromise observed with liquid or solid textures. ASSESSMENT   Patient presents with oropharyngeal swallow function that is within normal limits. No s/sx of dysphagia. Recommend regular diet/thin liquids. No dysphagia treatment indicated. Patient with reports of slurred speech at time of admission, but denies any additional language or cognitive deficits. MRI pending. Will follow up for further assessment if indicated by imaging results.             Tool Used: Dysphagia Outcome and Severity Scale (DOLORES)    Score Comments   Normal Diet  [] 7 With no strategies or extra time needed   Functional Swallow  [] 6 May have mild oral or pharyngeal delay   Mild Dysphagia  [] 5 Which may require one diet consistency restricted    Mild-Moderate Dysphagia  [] 4 With 1-2 diet consistencies restricted   Moderate Dysphagia  [] 3 With 2 or more diet consistencies restricted   Moderate-Severe Dysphagia  [] 2 With partial PO strategies (trials with ST only)   Severe Dysphagia  [] 1 With inability to tolerate any PO safely      Score:  Initial: 7 Most Recent: x (Date 02/17/20 )   Interpretation of Tool: The Dysphagia Outcome and Severity Scale (DOLORES) is a simple, easy-to-use, 7-point scale developed to systematically rate the functional severity of dysphagia based on objective assessment and make recommendations for diet level, independence level, and type of nutrition. Current Medications:   No current facility-administered medications on file prior to encounter. Current Outpatient Medications on File Prior to Encounter   Medication Sig Dispense Refill    atorvastatin (LIPITOR) 40 mg tablet Take 1 Tab by mouth nightly. 90 Tab 1    aspirin delayed-release 81 mg tablet Take 1 Tab by mouth daily. 30 Tab 0    apixaban (ELIQUIS) 2.5 mg tablet Take 1 Tab by mouth two (2) times a day. 60 Tab 0    allopurinol (ZYLOPRIM) 300 mg tablet Take 1 Tab by mouth daily. 90 Tab 1    amLODIPine (NORVASC) 5 mg tablet Take 1 Tab by mouth daily. 90 Tab 1    benazepril (LOTENSIN) 40 mg tablet Take 1 Tab by mouth daily. 90 Tab 1    finasteride (PROSCAR) 5 mg tablet Take 1 Tab by mouth daily. 90 Tab 1    fluticasone propionate (FLONASE) 50 mcg/actuation nasal spray 2 Sprays by Both Nostrils route daily. 3 Bottle 1    pantoprazole (PROTONIX) 40 mg tablet Take 1 Tab by mouth daily. 90 Tab 1    tamsulosin (FLOMAX) 0.4 mg capsule Take 1 Cap by mouth daily (after dinner). 90 Cap 1    acetaminophen (TYLENOL EXTRA STRENGTH) 500 mg tablet Take 1,000 mg by mouth nightly. PLAN    FREQUENCY/DURATION: No further speech therapy indicated at this time as oropharyngeal swallow function is within normal limits.  Speech therapy to follow up for assessment of cognitive-linguistic function.     - Recommendations for next treatment session: Next treatment will address cognitive-linguistic assessment if indicated     REHABILITATION POTENTIAL FOR STATED GOALS: Excellent         RECOMMENDATIONS   DIET:    PO:  Regular   Liquids:  regular thin    MEDICATIONS: With liquid     ASPIRATION PRECAUTIONS  · Slow rate of intake  · Small bites/sips  · Upright at 90 degrees during meal     COMPENSATORY STRATEGIES/MODIFICATIONS  · None     EDUCATION:  · Recommendations discussed with Nursing  · Patient     RECOMMENDATIONS for CONTINUED SPEECH THERAPY:   YES: Anticipate need for ongoing speech therapy during this hospitalization.        SAFETY:  After treatment position/precautions:  · RN at bedside  · Up on side of bed    Total Treatment Duration:   Time In: 1242  Time Out: [address], MSP, CCC-SLP

## 2020-02-17 NOTE — ED PROVIDER NOTES
Patient states approximately 1 hour prior to arrival he started having some tingling in his left arm and his left leg. He also had some difficulty walking. His granddaughter is present, states that he was slurring his speech a little bit. The patient states that the symptoms have markedly improved though he is still having some difficulty walking. He states he had similar symptoms at the end of January, was seen here and stayed in the hospital for 3 days. He states he had the same symptoms at that time. They resolved and he returned back to his baseline. He states that his speech has returned to normal and his tingling has resolved. He still has some slight difficulty walking.            Past Medical History:   Diagnosis Date    Cancer of prostate ([facility] [address] 2001    radiation tx     CKD (chronic kidney disease) stage 3, GFR 30-59 ml/min ([facility]) 8/18/2012    Gout 1/50/8026    Metabolic syndrome 7/48/6560    Mixed hyperlipidemia 8/18/2012    Moderate aortic valve stenosis     Peptic ulcer disease 8/18/2012    Unspecified essential hypertension 8/18/2012    Urinary retention        Past Surgical History:   Procedure Laterality Date    HX APPENDECTOMY  1950    HX CHOLECYSTECTOMY      HX COLONOSCOPY  2002    HX GASTRECTOMY      V&A, 1999    HX TURP  2000, 2015    Dx CA, followed by radiation Rx         Family History:   Problem Relation Age of Onset    Dementia Father        Social History     Socioeconomic History    Marital status:      Spouse name: Not on file    Number of children: Not on file    Years of education: Not on file    Highest education level: Not on file   Occupational History    Not on file   Social Needs    Financial resource strain: Not on file    Food insecurity:     Worry: Not on file     Inability: Not on file    Transportation needs:     Medical: Not on file     Non-medical: Not on file   Tobacco Use    Smoking status: Former Smoker     Packs/day: 1.00     Years: 10.00     Pack years: 10.00     Last attempt to quit: 1968     Years since quittin.1    Smokeless tobacco: Never Used    Tobacco comment: quit 60 years ago    Substance and Sexual Activity    Alcohol use: No    Drug use: No    Sexual activity: Not Currently   Lifestyle    Physical activity:     Days per week: Not on file     Minutes per session: Not on file    Stress: Not on file   Relationships    Social connections:     Talks on phone: Not on file     Gets together: Not on file     Attends Cheondoism service: Not on file     Active member of club or organization: Not on file     Attends meetings of clubs or organizations: Not on file     Relationship status: Not on file    Intimate partner violence:     Fear of current or ex partner: Not on file     Emotionally abused: Not on file     Physically abused: Not on file     Forced sexual activity: Not on file   Other Topics Concern    Not on file   Social History Narrative    Lives alone, , drives, two daughters he is close to, Adria Martins [phone], [place], close to both of them         ALLERGIES: Neurontin [gabapentin]    Review of Systems   Constitutional: Negative for chills and fever. Gastrointestinal: Negative for nausea and vomiting. All other systems reviewed and are negative. Vitals:    20 0924   BP: 137/76   Pulse: 76   Resp: 16   Temp: 98.4 °F (36.9 °C)   SpO2: 98%   Weight: 104.3 kg (230 lb)   Height: 5' 8\" (1.727 m)            Physical Exam  Vitals signs and nursing note reviewed. Constitutional:       Appearance: He is well-developed. HENT:      Head: Normocephalic and atraumatic. Eyes:      Conjunctiva/sclera: Conjunctivae normal.      Pupils: Pupils are equal, round, and reactive to light. Neck:      Musculoskeletal: Normal range of motion and neck supple. Cardiovascular:      Rate and Rhythm: Normal rate and regular rhythm. Heart sounds: Normal heart sounds.    Pulmonary:      Effort: Pulmonary effort is normal.      Breath sounds: Normal breath sounds. Abdominal:      General: Bowel sounds are normal.      Palpations: Abdomen is soft. Musculoskeletal: Normal range of motion. Skin:     General: Skin is warm and dry. Neurological:      Mental Status: He is alert and oriented to person, place, and time. Cranial Nerves: No cranial nerve deficit. Sensory: No sensory deficit. Motor: No weakness. Gait: Gait abnormal.      Comments: Patient has some slight difficulty ambulating due to subjective left leg weakness          MDM  Number of Diagnoses or Management Options  TIA (transient ischemic attack): new and requires workup  Diagnosis management comments: 9:43 AM code stroke called on patient presentation. Stroke team is present at the bedside. 10:01 AM discussed results with neurology, unremarkable head CT. Dr. 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 He recommends giving the patient 1 dose of Lovenox and starting him back on his Eliquis. Given that he still has some slight difficulty with his gait, he recommends keeping him in the hospital and getting another MRI. 10:30 AM discussed results with patient, unremarkable CT findings. I spoke with the pharmacist, states the time of onset of Lovenox and Eliquis is similar thus will not give Lovenox. The hospitalist has been paged.        Amount and/or Complexity of Data Reviewed  Clinical lab tests: ordered and reviewed  Tests in the radiology section of CPT®: ordered and reviewed  Tests in the medicine section of CPT®: ordered and reviewed  Obtain history from someone other than the patient: yes  Discuss the patient with other providers: yes    Risk of Complications, Morbidity, and/or Mortality  Presenting problems: high  Diagnostic procedures: high  Management options: high           CRITICAL CARE (ASAP ONLY)  Performed by: Shruthi Hernández MD  Authorized by: Facundo Vanegas MD     Critical care provider statement:     Critical care time (minutes):  45    Critical care time was exclusive of:  Separately billable procedures and treating other patients and teaching time    Critical care was necessary to treat or prevent imminent or life-threatening deterioration of the following conditions:  CNS failure or compromise, endocrine crisis and metabolic crisis    Critical care was time spent personally by me on the following activities:  Development of treatment plan with patient or surrogate, discussions with consultants, examination of patient, review of old charts, re-evaluation of patient's condition and pulse oximetry    I assumed direction of critical care for this patient from another provider in my specialty: no

## 2020-02-17 NOTE — PROGRESS NOTES
Initial visit made to patient and a prayer was provided to the patient and his family members.         NILAY Pantoja

## 2020-02-17 NOTE — PROGRESS NOTES
02/17/20 1230   NIH Stroke Scale   Interval Other (comment)  (Dual NIH with LEXI Schofield)   LOC 0   LOC Questions 0   LOC Commands 0   Best Gaze 0   Visual 0   Facial Palsy 0   Motor Right Arm 0   Motor Left Arm 0   Motor Right Leg 0   Motor Left Leg 0   Limb Ataxia 0   Sensory 0   Best Language 0   Dysarthria 0   Extinction and Inattention 0   Total 0

## 2020-02-17 NOTE — CONSULTS
Consult    Patient: Amparo John MRN: [id number]     YOB: 1932  Age: 80 y.o. Sex: male      Subjective:      Amparo John is a 80 y.o. male who is being seen for code S. The patient developed sudden onset of left arm and left leg weakness at 8 am. The symptoms lasted for approximately 1 hour and then resolved. The patient was last known normal at 0800. The patient was seen here  Three weeks ago for similar symptoms. He was found to have embolic appearing infarcts and started on Eliquis. Patient states that his PCP told him he was not sure he needed to be on both asprin and Eliquis, so the patient stopped Eliquis. The patient presented to [facility] ED. A code S was called at 0935. Neurology arrived to the bedside at [phone]. Initial NIHSS was 0. A CT of the head was obtained and was negative for acute changes.      Past Medical History:   Diagnosis Date    Cancer of prostate ([facility] [address]     radiation tx     CKD (chronic kidney disease) stage 3, GFR 30-59 ml/min ([facility]) 2012    Gout     Metabolic syndrome     Mixed hyperlipidemia 2012    Moderate aortic valve stenosis     Peptic ulcer disease 2012    Unspecified essential hypertension 2012    Urinary retention      Past Surgical History:   Procedure Laterality Date    HX APPENDECTOMY  1950    HX CHOLECYSTECTOMY      HX COLONOSCOPY      HX GASTRECTOMY      V&A,     HX TURP  ,     Dx CA, followed by radiation Rx      Family History   Problem Relation Age of Onset    Dementia Father      Social History     Tobacco Use    Smoking status: Former Smoker     Packs/day: 1.00     Years: 10.00     Pack years: 10.00     Last attempt to quit: 1968     Years since quittin.1    Smokeless tobacco: Never Used    Tobacco comment: quit 60 years ago    Substance Use Topics    Alcohol use: No      Current Facility-Administered Medications   Medication Dose Route Frequency Provider Last Rate Last Dose    tuberculin injection 5 Units  5 Units IntraDERMal Isaac Kidd, DO        ondansetron [facility]) injection 4 mg  4 mg IntraVENous Q4H PRN Gallitzin [place], DO        aspirin chewable tablet 81 mg  81 mg Oral DAILY Gallitzin [place], DO   81 mg at 02/17/20 1252    acetaminophen (TYLENOL) tablet 650 mg  650 mg Oral Q4H PRN Gallitzin Dk, DO        labetaloL (NORMODYNE;TRANDATE) injection 5 mg  5 mg IntraVENous Q10MIN PRN Gallitzin Dk, DO        polyethylene glycol (MIRALAX) packet 17 g  17 g Oral DAILY PRN Alvino [place], DO        [START ON 2/18/2020] allopurinoL (ZYLOPRIM) tablet 300 mg  300 mg Oral DAILY Alvino [place], DO        atorvastatin (LIPITOR) tablet 40 mg  40 mg Oral QHS Alvino [place], DO        [START ON 2/18/2020] finasteride (PROSCAR) tablet 5 mg  5 mg Oral DAILY Gallitzin Dk, DO        [START ON 2/18/2020] pantoprazole (PROTONIX) tablet 40 mg  40 mg Oral DAILY Alvino Dk, DO        tamsulosin [facility]) capsule 0.4 mg  0.4 mg Oral PCD Alvino [place], DO        naloxone [facility]) injection 0.4 mg  0.4 mg IntraVENous PRN Gallitzin [place], DO        apixaban [facility]) tablet 2.5 mg  2.5 mg Oral BID Gallitzin [place], DO            Allergies   Allergen Reactions    Neurontin [Gabapentin] Other (comments)     Dizzy. Pt reports that he's now taking the medicine at night before bed - no longer having issues and is continuing to take this med.  9/22/2016       Review of Systems:  Not obtained due to emergent situation         Objective:     Vitals:    02/17/20 0924 02/17/20 1141 02/17/20 1230   BP: 137/76 149/67 145/67   Pulse: 76  68   Resp: 16  16   Temp: 98.4 °F (36.9 °C)  98.8 °F (37.1 °C)   SpO2: 98% 99% 98%   Weight: 230 lb (104.3 kg)     Height: 5' 8\" (1.727 m)          Physical Exam:  General - Well developed, well nourished, in no apparent distress. Pleasant and conversent. HEENT - Normocephalic, atraumatic. Conjunctiva, tympanic membranes, and oropharynx are clear. Neck - Supple without masses, no bruits   Cardiovascular - Regular rate and rhythm. Normal S1, S2 without murmurs, rubs, or gallops. Lungs - Clear to auscultation. Abdomen - Soft, nontender with normal bowel sounds. Extremities - Peripheral pulses intact. No edema and no rashes. NIHSS   NIHSS Score: 0  1a-Level of Consciousness 0  1b-What is Month/Age 0  1c-Open/Close Eyes&Hand 0  2 -Best Gaze 0  3 -Visual Fields 0  4 -Facial Palsy 0  5a-Motor-Left Arm 0  5b-Motor-Right Arm 0  6a-Motor-Left Leg 0  6b-Motor-Right Leg 0  7 -Limb Ataxia 0  8 -Sensory 0  9 -Best Language 0  10-Dysarthria 0  11-Extinction/Inattention 0    Lab Results   Component Value Date/Time    Cholesterol, total 131 02/01/2020 05:17 AM    HDL Cholesterol 45 02/01/2020 05:17 AM    LDL, calculated 62.2 02/01/2020 05:17 AM    VLDL, calculated 23.8 (H) 02/01/2020 05:17 AM    Triglyceride 119 02/01/2020 05:17 AM    CHOL/HDL Ratio 2.9 02/01/2020 05:17 AM        Lab Results   Component Value Date/Time    Hemoglobin A1c 5.3 02/01/2020 05:17 AM        Images personally reviewed by me at . CT Results (most recent):  Results from Hospital Encounter encounter on 02/17/20   CT CODE NEURO HEAD WO CONTRAST    Narrative EXAM: CT HEAD WITHOUT CONTRAST    INDICATION: patient with acute neuro changes, code stroke. COMPARISON: Head CT 1/31/2020     TECHNIQUE: Contiguous axial images were obtained from the skull base through the  vertex without IV contrast. Radiation dose reduction techniques were used for  this study. Our CT scanners use one or all of the following: Automated exposure  control, adjustment of the mA and/or kV according to patient size, iterative  reconstruction. FINDINGS:   Global parenchymal volume loss and ex vacuo ventriculomegaly. Periventricular  white matter hypoattenuation reflects sequelae of small vessel ischemic disease.   No acute infarction or hemorrhage is evident by CT. No hydrocephalus or midline  shift. No extra-axial mass or hemorrhage. The basal cisterns are patent. The visualized portions of the orbits are normal. The mastoid air cells and  paranasal sinuses are patent. Atherosclerotic calcifications of the carotid siphons. The calvarium and soft tissues appear normal.      Impression IMPRESSION:   No acute intracranial abnormality evident by CT. Results for orders placed or performed during the hospital encounter of 02/17/20   EKG, 12 LEAD, INITIAL   Result Value Ref Range    Ventricular Rate 76 BPM    Atrial Rate 76 BPM    P-R Interval 152 ms    QRS Duration 114 ms    Q-T Interval 376 ms    QTC Calculation (Bezet) 423 ms    Calculated P Axis 65 degrees    Calculated R Axis -66 degrees    Calculated T Axis 61 degrees    Diagnosis       !! AGE AND GENDER SPECIFIC ECG ANALYSIS !! Normal sinus rhythm  Left anterior fascicular block  ST abnormality, possible digitalis effect  Abnormal ECG  When compared with ECG of 31-JAN-2020 18:31,  ST no longer depressed in Inferior leads  Non-specific change in ST segment in Lateral leads  T wave inversion no longer evident in Inferior leads  Nonspecific T wave abnormality, improved in Lateral leads          MRI Results (most recent):   Results from Hospital Encounter encounter on 01/31/20   MRI BRAIN WO CONT    Narrative MRI of the brain     INDICATION: Ataxia and left-sided weakness, history of prostate cancer    Standard MRI sequences were obtained through the brain in multiple planes  without IV contrast    FINDINGS:  Small areas of peripheral resected diffusion are also present in the left  occipital and superior parietal cortex. .  These appear more acute. There is no  associated hemorrhage. There is moderate chronic change in white matter. The  ventricles are normal in size. There are no extra-axial fluid collections. There are no intracranial masses. There are no bony lesions. The sinuses are  clear. Impression IMPRESSION: Multiple small lacunar infarcts. Left occipital and parietal  infarcts are probably acute. Right carli infarct is likely subacute. No  evidence of hemorrhage. Most recent Echo:  No results found for this visit on 02/17/20. Assessment:     80year old male seen as a code S with acute onset of left sided weakness, now resolved. Initial NIHSS was 0. The patient was recently seen at [facility] for embolic appearing stroke and was started on Eliquis, however he discontinued this medication on his own. CT of the head was obtained and was negative for acute changes. CTA of head neck was not obtained due to low NIHSS. The patient was not a candidate for alteplase because symptoms were resolved. . The patient was not a candidate for mechanical thrombectomy due to resolved symptoms. Plan:     · Restart Eliquis   · Continue statin   · Neurochecks Q4H  · MRI/MRA of brain  · Bedside swallow test   · Labs: A1c, FLP, TSH, Cardiac enzymes, BMP, CBC  · Telemetry  · PT/OT/ST  · BP management - allow permissive HTN to 220/120 x24 hours, treat with labetalol 10 mg IV or nicardipine gtt  · Smoking cessation if applicable   · Diabetes education if applicable   · Depression Screening prior to discharge      Signed By: Alea Colvin NP     February 17, 2020      I spent 50 minutes with the patient. Over 50% of that time was spent face-to-face with the patient and family. Attending physician note  Patient seen and examined  Well-known to me. Patient discontinued Eliquis because he indicated he had received mixed signals from primary care with reference to its use  The patient's original MRI scan while the infarct size was tiny were clearly in multifaceted multi-territorial and cortical distributions. THEY WERE NOT LACUNAR. Recurrence of symptoms strongly suggests ongoing embolic events    The patient is alert cooperative and oriented. The examination of the head neck is unremarkable. The carotid arteries are full without bruit. No cervical adenopathy or masses noted in the neck. Cognitive functions demonstrate the patient has normal orientation normal speech immediate and remote recall is normal.  Judgment is intact. Mood is euthymic. Cranial nerves  Cranial nerve I not tested  Cranial nerve 2 the optic disks are normal visual fields full to finger dragon confrontation  3/4/6 full extraocular movements pupils equal and reactive to light no ptosis no lid lag. 5 /7 facial sensation and movement is symmetric bilaterally. There is no asymmetry to movement of the upper or lower face. 8 hearing is intact bilaterally of the Culp and Viveca Kg do not lateralize  9 swallow is intact  11 sternocleidomastoid is symmetric bilaterally  12 tongue is midline in the mouth without atrophy or fasciculations    Motor examination  Inspection of musculature in both upper and lower extremities reveals no atrophy or fasciculations of either a focal or group type. Distal and proximal muscle bulk is appropriate. Motor performance in the upper extremities including the deltoid, biceps, triceps, wrist extensor and hand intrinsic muscles is 5/5    Muscle performance in the lower extremities including hip flexors knee extensors foot dorsiflexion and plantar flexion is 5/5. Great toe extension is normal    Sensory examination  There is no deficit noted to light touch or vibratory sensation distally. Graphesthesia is intact.     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   The fact that the source of embolism which is central may not be visible on current cardiac noninvasive technology does not necessarily mean that it is not there and that it is appropriate based upon his imaging findings to treat this is an embolic event and the importance of it in terms of the fact that the events have recurred is self-evident

## 2020-02-17 NOTE — ED TRIAGE NOTES
Patient comes to ED for left arm and left leg weakness about 1 hour ago. Symptoms lasted about 30 minutes and have since resolved. Also reports during that time speech became slurred. That has also subsided. States had these same symptoms 2 weeks ago as well. Denies any pain at this time.

## 2020-02-17 NOTE — PROGRESS NOTES
02/17/20 1230   Dual Skin Pressure Injury Assessment   Dual Skin Pressure Injury Assessment WDL   Second Care Provider (Based on [address] [place] [address]) LEXI Schofield   Skin Integumentary   Skin Integumentary (WDL) X   Skin Color Appropriate for ethnicity   Skin Condition/Temp Warm;Dry   Skin Integrity Scars (comment)   Turgor Epidermis thin w/ loss of subcut tissue   Hair Growth Absent   Varicosities Present   Wound Prevention and Protection Methods   Orientation of Wound Prevention Posterior   Location of Wound Prevention Sacrum/Coccyx   Dressing Present  No   Wound Offloading (Prevention Methods) Bed, pressure reduction mattress;Pillows;Repositioning;Turning

## 2020-02-18 ENCOUNTER — PATIENT OUTREACH (OUTPATIENT)
Dept: CASE MANAGEMENT | Age: 85
End: 2020-02-18

## 2020-02-18 VITALS
RESPIRATION RATE: 18 BRPM | BODY MASS INDEX: 34.86 KG/M2 | TEMPERATURE: 98.1 F | SYSTOLIC BLOOD PRESSURE: 137 MMHG | HEIGHT: 68 IN | DIASTOLIC BLOOD PRESSURE: 69 MMHG | WEIGHT: 230 LBS | HEART RATE: 62 BPM | OXYGEN SATURATION: 95 %

## 2020-02-18 PROBLEM — G45.9 TIA (TRANSIENT ISCHEMIC ATTACK): Status: ACTIVE | Noted: 2020-02-18

## 2020-02-18 LAB
ANION GAP SERPL CALC-SCNC: 6 MMOL/L (ref 7–16)
BUN SERPL-MCNC: 35 MG/DL (ref 8–23)
CALCIUM SERPL-MCNC: 9 MG/DL (ref 8.3–10.4)
CHLORIDE SERPL-SCNC: 117 MMOL/L (ref 98–107)
CO2 SERPL-SCNC: 20 MMOL/L (ref 21–32)
CREAT SERPL-MCNC: 1.79 MG/DL (ref 0.8–1.5)
ERYTHROCYTE [DISTWIDTH] IN BLOOD BY AUTOMATED COUNT: 14.1 % (ref 11.9–14.6)
GLUCOSE SERPL-MCNC: 91 MG/DL (ref 65–100)
HCT VFR BLD AUTO: 36.3 % (ref 41.1–50.3)
HGB BLD-MCNC: 11.5 G/DL (ref 13.6–17.2)
MCH RBC QN AUTO: 33.2 PG (ref 26.1–32.9)
MCHC RBC AUTO-ENTMCNC: 31.7 G/DL (ref 31.4–35)
MCV RBC AUTO: 104.9 FL (ref 79.6–97.8)
NRBC # BLD: 0 K/UL (ref 0–0.2)
PLATELET # BLD AUTO: 138 K/UL (ref 150–450)
PMV BLD AUTO: 12.2 FL (ref 9.4–12.3)
POTASSIUM SERPL-SCNC: 5 MMOL/L (ref 3.5–5.1)
RBC # BLD AUTO: 3.46 M/UL (ref 4.23–5.6)
SODIUM SERPL-SCNC: 143 MMOL/L (ref 136–145)
WBC # BLD AUTO: 8 K/UL (ref 4.3–11.1)

## 2020-02-18 PROCEDURE — 65270000029 HC RM PRIVATE

## 2020-02-18 PROCEDURE — 97161 PT EVAL LOW COMPLEX 20 MIN: CPT

## 2020-02-18 PROCEDURE — 85027 COMPLETE CBC AUTOMATED: CPT

## 2020-02-18 PROCEDURE — 99232 SBSQ HOSP IP/OBS MODERATE 35: CPT | Performed by: PSYCHIATRY & NEUROLOGY

## 2020-02-18 PROCEDURE — 80048 BASIC METABOLIC PNL TOTAL CA: CPT

## 2020-02-18 PROCEDURE — 36415 COLL VENOUS BLD VENIPUNCTURE: CPT

## 2020-02-18 PROCEDURE — 74011250637 HC RX REV CODE- 250/637: Performed by: FAMILY MEDICINE

## 2020-02-18 PROCEDURE — 99218 HC RM OBSERVATION: CPT

## 2020-02-18 PROCEDURE — 97110 THERAPEUTIC EXERCISES: CPT

## 2020-02-18 RX ADMIN — ASPIRIN 81 MG 81 MG: 81 TABLET ORAL at 09:01

## 2020-02-18 RX ADMIN — PANTOPRAZOLE SODIUM 40 MG: 40 TABLET, DELAYED RELEASE ORAL at 09:01

## 2020-02-18 RX ADMIN — APIXABAN 2.5 MG: 2.5 TABLET, FILM COATED ORAL at 09:01

## 2020-02-18 RX ADMIN — FINASTERIDE 5 MG: 5 TABLET, FILM COATED ORAL at 09:01

## 2020-02-18 RX ADMIN — ALLOPURINOL 300 MG: 300 TABLET ORAL at 09:01

## 2020-02-18 NOTE — PROGRESS NOTES
Problem: Mobility Impaired (Adult and Pediatric)  Goal: *Acute Goals and Plan of Care (Insert Text)  Description  LTG:  (1.)Mr. Brianna Buenrostro will transfer from bed to chair and chair to bed with INDEPENDENT using the least restrictive device within 7 day(s). 2.)Mr. Brianna Buenrostro will ambulate with modified INDEPENDENCE for 500+ feet with the least restrictive/no device within 7 day(s). (3.)Mr. Brianna Buenrostro will perform standing static and dynamic balance activities x 8 minutes with SUPERVISION to improve safety within 7 day(s). (4.)Mr. Brianna Buenrostro will ascend and descend 4 stairs using one hand rail(s) with modified independence to improve functional mobility and safety within 7 day(s). Outcome: Progressing Towards Goal     PHYSICAL THERAPY: Initial Assessment and Daily Note 2/18/2020  INPATIENT: PT Visit Days : 1  Payor: Olivia Hawkins / Plan: [address] [place] [address] HMO / Product Type: [employer] /       NAME/AGE/GENDER: Mirna Epps is a 80 y.o. male   PRIMARY DIAGNOSIS: Left-sided weakness [R53.1]  Slurred speech [R47.81]  CVA (cerebral vascular accident) ([facility] [address] [I63.9] Slurred speech Slurred speech        ICD-10: Treatment Diagnosis:    Other abnormalities of gait and mobility (R26.89)   Precaution/Allergies:  Neurontin [gabapentin]      ASSESSMENT:     Mr. Brianna Buenrostro presents supine in bed with daughter and granddaughter at bedside. He lives with his daughter and ambulates independently in home and community, has recently begun using a cane. Patient is alert and Ox4. Patient transitioned to sit independently. He stood independently, but experienced a slight loss of balance, able to regain independently. Patient able to perform Romberg and tandem stand. He ambulated 250' with SBA, able to perform retrogait as well. Patient appears to be functioning close to his baseline-only deficit noted was slight loss of balance initially. He may benefit from going to outpatient PT.   Mr. Brianna Buenrostro would benefit from skilled physical therapy (medically necessary) to address his deficits and maximize his function. Initiated treatment to include standing exercises below. Good effort and participation. This section established at most recent assessment   PROBLEM LIST (Impairments causing functional limitations):  Decreased Ambulation Ability/Technique  Decreased Balance   INTERVENTIONS PLANNED: (Benefits and precautions of physical therapy have been discussed with the patient.)  Gait Training  Neuromuscular Re-education/Strengthening  Therapeutic Activites  Therapeutic Exercise/Strengthening  education      TREATMENT PLAN: Frequency/Duration: 3 times a week for duration of hospital stay  Rehabilitation Potential For Stated Goals: Excellent     REHAB RECOMMENDATIONS (at time of discharge pending progress):    Placement: It is my opinion, based on this patient's performance to date, that Mr. Marisela Blake may benefit from OUTPATIENT THERAPY after discharge due to the functional deficits listed above that are likely to improve with skilled rehabilitation because because he/she will benefit from the individualized approach tailored to his/her deficits. Equipment:   None at this time              HISTORY:   History of Present Injury/Illness (Reason for Referral):  Per MD note, \"     Patient is a 80 y.o. male who presented to the ED for cc tingling in his left arm and leg. Also admits to difficulty with ambulation for the past one hour along with slurred speech. Hx of HLD, HTN, BPH, CKD stage 3, AV stenosis, prostate CA s/p radiation, and CVAs in the past. 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 Eliquis, ASA, and statin started. Patient states he stopped taking his Eliquis. Neurology has seen and recommended starting back his Eliquis.  \"  Past Medical History/Comorbidities:   Mr. Marisela Blake  has a past medical history of Cancer of prostate ([facility] (2001), CKD (chronic kidney disease) stage 3, GFR 30-59 ml/min ([facility]) (8/18/2012), Gout (5/98/5623), Metabolic syndrome (0/44/8744), Mixed hyperlipidemia (8/18/2012), Moderate aortic valve stenosis, Peptic ulcer disease (8/18/2012), Unspecified essential hypertension (8/18/2012), and Urinary retention. He also has no past medical history of Difficult intubation, Malignant hyperthermia due to anesthesia, Nausea & vomiting, Pseudocholinesterase deficiency, or Unspecified adverse effect of anesthesia. Mr. Mal Hernandez  has a past surgical history that includes hx appendectomy (1950); hx cholecystectomy; hx gastrectomy; hx colonoscopy (2002); and hx turp (2000, 2015). Social History/Living Environment:   Home Environment: Private residence  # Steps to Enter: 3  Rails to Enter: Yes  One/Two Story Residence: Two story, live on 1st floor  Living Alone: No  Support Systems: Family member(s)  Patient Expects to be Discharged to[de-identified] Private residence  Current DME Used/Available at Home: Cane, straight  Prior Level of Function/Work/Activity:  He lives with his daughter and ambulates independently in home and community, has recently begun using a cane. Number of Personal Factors/Comorbidities that affect the Plan of Care: 0: LOW COMPLEXITY   EXAMINATION:   Most Recent Physical Functioning:   Gross Assessment:  AROM: Within functional limits  Strength: Within functional limits  Coordination: Within functional limits  Tone: Normal  Sensation: Intact(to light touch)               Posture:  Posture (WDL): Exceptions to WDL  Posture Assessment:  Forward head, Rounded shoulders  Balance:  Sitting: Intact  Standing: Intact Bed Mobility:  Rolling: Independent  Supine to Sit: Independent  Wheelchair Mobility:     Transfers:  Sit to Stand: Stand-by assistance  Stand to Sit: Stand-by assistance  Gait:     Distance (ft): 250 Feet (ft)  Assistive Device: Gait belt  Ambulation - Level of Assistance: Stand-by assistance  Interventions: Verbal cues; Safety awareness training      Body Structures Involved:  None Body Functions Affected:  None Activities and Participation Affected: Mobility   Number of elements that affect the Plan of Care: 1-2: LOW COMPLEXITY   CLINICAL PRESENTATION:   Presentation: Stable and uncomplicated: LOW COMPLEXITY   CLINICAL DECISION MAKING:   [facility] [place] [place] [employer] AM-PAC 6 Clicks   Basic Mobility Inpatient Short Form  How much difficulty does the patient currently have. .. Unable A Lot A Little None   1. Turning over in bed (including adjusting bedclothes, sheets and blankets)? [] 1   [] 2   [] 3   [x] 4   2. Sitting down on and standing up from a chair with arms ( e.g., wheelchair, bedside commode, etc.)   [] 1   [] 2   [] 3   [x] 4   3. Moving from lying on back to sitting on the side of the bed? [] 1   [] 2   [] 3   [x] 4   How much help from another person does the patient currently need. .. Total A Lot A Little None   4. Moving to and from a bed to a chair (including a wheelchair)? [] 1   [] 2   [] 3   [x] 4   5. Need to walk in hospital room? [] 1   [] 2   [x] 3   [] 4   6. Climbing 3-5 steps with a railing? [] 1   [] 2   [x] 3   [] 4   © 2007, Trustees of [facility] [place] [place] [employer], under license to [employer]. All rights reserved      Score:  Initial: 22 Most Recent: X (Date: -- )    Interpretation of Tool:  Represents activities that are increasingly more difficult (i.e. Bed mobility, Transfers, Gait). Medical Necessity:     Patient is expected to demonstrate progress in   balance   to   increase independence with   and improve safety during all functional mobility   . Reason for Services/Other Comments:  Patient continues to require skilled intervention due to   Loss of balance.    .   Use of outcome tool(s) and clinical judgement create a POC that gives a: Clear prediction of patient's progress: LOW COMPLEXITY            TREATMENT:   (In addition to Assessment/Re-Assessment sessions the following treatments were rendered)   Pre-treatment Symptoms/Complaints:  0  Pain: Initial:   Pain Intensity 1: 0  Post Session:  0     Therapeutic Exercise: ( 10 minutes):  Exercises per grid below to improve mobility and strength. Required minimal visual and verbal cues to promote proper body alignment. Progressed complexity of movement as indicated. Hands on table top for balance. Date: 2/18/20        Ambulation  Device  assistance         Partial Squats         Hip Abduction/ Adduction Standing 2x10 AB        Heel Raises  Standing X20 AB        Toe Raises Standing X20 AB        Hip Flexion Standing X20 AB        Sit to Stand         Key:  R=right, L=left, B=bilaterally, A=active, AA=active assisted, P=passive      Braces/Orthotics/Lines/Etc:   O2 Device: Room air  Treatment/Session Assessment:    Response to Treatment:  pleasant and cooperative. Interdisciplinary Collaboration:   Physical Therapist  Registered Nurse  After treatment position/precautions:   Up in chair  Bed/Chair-wheels locked  Call light within reach  Family at bedside   Compliance with Program/Exercises: Compliant all of the time, Will assess as treatment progresses  Recommendations/Intent for next treatment session: \"Next visit will focus on advancements to more challenging activities and reduction in assistance provided\".   Total Treatment Duration:  PT Patient Time In/Time Out  Time In: 1019  Time Out: 629 Fabio Mondragon PT, DPT

## 2020-02-18 NOTE — PROGRESS NOTES
Discharge instructions provided to patient and patient verbalized understanding of same. Opportunity provided for questions and all questions answered. IV and heart monitor removed. Pt's granddaughter to come pick patient up.

## 2020-02-18 NOTE — PROGRESS NOTES
This note will not be viewable in [address] Opened chart for KARL out reach, patient is noted to be current inpatient. No further KARL calls needed at this time. Patient to be reassigned pending discharge disposition.

## 2020-02-18 NOTE — PROGRESS NOTES
Problem: Falls - Risk of  Goal: *Absence of Falls  Description  Document Toby Crenshaw Fall Risk and appropriate interventions in the flowsheet.   Outcome: Progressing Towards Goal  Note: Fall Risk Interventions:  Mobility Interventions: Bed/chair exit alarm, OT consult for ADLs, Patient to call before getting OOB, PT Consult for mobility concerns         Medication Interventions: Bed/chair exit alarm, Evaluate medications/consider consulting pharmacy, Teach patient to arise slowly, Patient to call before getting OOB    Elimination Interventions: Call light in reach, Bed/chair exit alarm, Patient to call for help with toileting needs, Stay With Me (per policy), Urinal in reach              Problem: Patient Education: Go to Patient Education Activity  Goal: Patient/Family Education  Outcome: Progressing Towards Goal     Problem: Patient Education: Go to Patient Education Activity  Goal: Patient/Family Education  Outcome: Progressing Towards Goal     Problem: TIA/CVA Stroke: 0-24 hours  Goal: Activity/Safety  Outcome: Progressing Towards Goal  Goal: Consults, if ordered  Outcome: Progressing Towards Goal  Goal: Diagnostic Test/Procedures  Outcome: Progressing Towards Goal  Goal: Nutrition/Diet  Outcome: Progressing Towards Goal  Goal: Discharge Planning  Outcome: Progressing Towards Goal  Goal: Medications  Outcome: Progressing Towards Goal  Goal: Respiratory  Outcome: Progressing Towards Goal  Goal: Treatments/Interventions/Procedures  Outcome: Progressing Towards Goal  Goal: Minimize risk of bleeding post-thrombolytic infusion  Outcome: Progressing Towards Goal  Goal: Monitor for complications post-thrombolytic infusion  Outcome: Progressing Towards Goal  Goal: Psychosocial  Outcome: Progressing Towards Goal  Goal: *Hemodynamically stable  Outcome: Progressing Towards Goal  Goal: *Neurologically stable  Description  Absence of additional neurological deficits    Outcome: Progressing Towards Goal  Goal: *Verbalizes anxiety and depression are reduced or absent  Outcome: Progressing Towards Goal  Goal: *Absence of Signs of Aspiration on Current Diet  Outcome: Progressing Towards Goal  Goal: *Absence of deep venous thrombosis signs and symptoms(Stroke Metric)  Outcome: Progressing Towards Goal  Goal: *Ability to perform ADLs and demonstrates progressive mobility and function  Outcome: Progressing Towards Goal  Goal: *Stroke education started(Stroke Metric)  Outcome: Progressing Towards Goal  Goal: *Dysphagia screen performed(Stroke Metric)  Outcome: Progressing Towards Goal  Goal: *Rehab consulted(Stroke Metric)  Outcome: Progressing Towards Goal

## 2020-02-18 NOTE — PROGRESS NOTES
02/18/20 0647   NIH Stroke Scale   Interval Other (comment)  (Dual NIH with Oli Agustin RN )   LOC 0   LOC Questions 0   LOC Commands 0   Best Gaze 0   Visual 0   Facial Palsy 0   Motor Right Arm 0   Motor Left Arm 0   Motor Right Leg 0   Motor Left Leg 0   Limb Ataxia 0   Sensory 0   Best Language 0   Dysarthria 0   Extinction and Inattention 0   Total 0

## 2020-02-18 NOTE — DISCHARGE SUMMARY
Hospitalist Discharge Summary     Patient ID:  Geoff Adler  [id number]  89 y.o.  12/27/1932  Admit date: 2/17/2020  9:25 AM  Discharge date and time: 2/18/2020  Attending: Janay Colorado DO  PCP:  Deedee Sheldon MD  Treatment Team: Attending Provider: Janay Colorado DO; Consulting Provider: Adalgisa Mccoy MD; Speech Language Pathologist: Bethany Lee Nurse: Corinna Taylor Occupational Therapist: Rachel Iyer OT; Physical Therapist: Estephanie Sprague, PT, DPT; Utilization Review: Brenda Lyle RN    Principal Diagnosis Slurred speech   Principal Problem:    Slurred speech (2/17/2020)    Active Problems:    Hypertension (8/18/2012)      Mixed hyperlipidemia (8/18/2012)      Overview: Calcium score of 24      Chronic kidney disease, stage III (moderate) ([facility] [address] (8/18/2012)      CVA (cerebral vascular accident) ([facility] [address] (1/31/2020)      Left-sided weakness (2/17/2020)    Hospital Course:  Please refer to the admission H&P for details of presentation. In summary, the Geoff Adler is a 80 y.o. male with a h/o HTN, PUD, peripheral neuropathy, Aortic stenosis, HLD, BPH, CKD III, prostate CA s/p radiation, and CVAs in the past who presented to the ED for cc tingling in his left arm and leg, difficulty with ambulating, slurred speech. 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 \"The patient's original MRI scan while the infarct size was tiny were clearly in multifaceted multi-territorial and cortical distributions. \" Eliquis, ASA, and statin started. Patient states he stopped taking his Eliquis. Neurology has seen him and repeat imaging appears similar. This was likely due to a TIA and they recommended starting back his Eliquis. Stable for DC. PT eval recommend OP PT.      Significant Diagnostic Studies:   Labs: Results:       Chemistry Recent Labs     02/18/20  0524 02/17/20  1240 02/17/20  8088 GLU 91 109* 109*    143 143   K 5.0 4.8 4.9   * 116* 115*   CO2 20* 21 23   BUN 35* 33* 36*   CREA 1.79* 1.80* 1.95*   CA 9.0 9.5 9.7   AGAP 6* 6* 5*   AP  --   --  107   TP  --   --  6.9   ALB  --   --  3.7   GLOB  --   --  3.2   AGRAT  --   --  1.2      CBC w/Diff Recent Labs     02/18/20  0524 02/17/20  1240 02/17/20  0929   WBC 8.0 8.2 8.4   RBC 3.46* 3.90* 3.99*   HGB 11.5* 13.0* 13.2*   HCT 36.3* 41.1 42.3   * 151 161   GRANS  --  67 58   LYMPH  --  20 26   EOS  --  5 7      Cardiac Enzymes No results for input(s): CPK, CKND1, TOMAS in the last 72 hours. No lab exists for component: CKRMB, TROIP   Coagulation Recent Labs     02/17/20  0929   PTP 13.0   INR 1.0       Lipid Panel Lab Results   Component Value Date/Time    Cholesterol, total 131 02/01/2020 05:17 AM    HDL Cholesterol 45 02/01/2020 05:17 AM    LDL, calculated 62.2 02/01/2020 05:17 AM    VLDL, calculated 23.8 (H) 02/01/2020 05:17 AM    Triglyceride 119 02/01/2020 05:17 AM    CHOL/HDL Ratio 2.9 02/01/2020 05:17 AM      BNP No results for input(s): BNPP in the last 72 hours. Liver Enzymes Recent Labs     02/17/20  0929   TP 6.9   ALB 3.7      SGOT 28      Thyroid Studies Lab Results   Component Value Date/Time    TSH 3.920 11/27/2019 08:51 AM          IMAGING/PROCEDURES:  CT 2/17: No acute intracranial abnormality evident by CT. MRI 2/17: 1. Stable diffusion abnormality in the right ventrolateral carli. This is likely  a subacute lacunar infarct.     2. Cerebral volume loss and white matter findings compatible with chronic small  vessel ischemic disease.     3. Old left corona radiata lacunar infarct. MRA 2/17: 1. No evidence of large vessel occlusive disease or high-grade stenosis. 2. Absent signal in the left vertebral artery however this appears to terminate  in PICA. 2/1 Carotid US: No hemodynamically significant stenosis is identified. Atherosclerosis is  present at the carotid bulbs.     Discharge Exam:  Visit Vitals  /59 (BP 1 Location: Right arm, BP Patient Position: At rest)   Pulse 70   Temp 97.9 °F (36.6 °C)   Resp 17   Ht 5' 8\" (1.727 m)   Wt 104.3 kg (230 lb)   SpO2 94%   BMI 34.97 kg/m²     General: Alert and oriented. No acute distress. Eye: EOMI, Normal conjunctiva. HENT: Normocephalic, atraumatic, Normal hearing, dry mucous membranes. Respiratory: Lungs are clear to auscultation, Respirations are non-labored. Cardiovascular: Normal rate, Regular rhythm, No murmur. Gastrointestinal: Soft, Non-tender, nondistended, positive bowel sounds. Musculoskeletal: Normal range of motion, Normal strength, No tenderness, no edema. Integumentary: Warm, Dry, Pink, no rash. Neurologic: Alert, Oriented, No focal deficits. Speech resolved. strength equal ue. Psychiatric: Cooperative, Appropriate mood & affect. Disposition: home  Discharge Condition: stable  Patient Instructions:   Current Discharge Medication List      CONTINUE these medications which have NOT CHANGED    Details   atorvastatin (LIPITOR) 40 mg tablet Take 1 Tab by mouth nightly. Qty: 90 Tab, Refills: 1    Associated Diagnoses: Mixed hyperlipidemia      aspirin delayed-release 81 mg tablet Take 1 Tab by mouth daily. Qty: 30 Tab, Refills: 0      apixaban (ELIQUIS) 2.5 mg tablet Take 1 Tab by mouth two (2) times a day. Qty: 60 Tab, Refills: 0      allopurinol (ZYLOPRIM) 300 mg tablet Take 1 Tab by mouth daily. Qty: 90 Tab, Refills: 1    Associated Diagnoses: Chronic idiopathic gout involving toe of right foot without tophus      amLODIPine (NORVASC) 5 mg tablet Take 1 Tab by mouth daily. Qty: 90 Tab, Refills: 1    Associated Diagnoses: Essential hypertension      benazepril (LOTENSIN) 40 mg tablet Take 1 Tab by mouth daily. Qty: 90 Tab, Refills: 1    Associated Diagnoses: Essential hypertension      finasteride (PROSCAR) 5 mg tablet Take 1 Tab by mouth daily.   Qty: 90 Tab, Refills: 1    Associated Diagnoses: Urinary retention      fluticasone propionate (FLONASE) 50 mcg/actuation nasal spray 2 Sprays by Both Nostrils route daily. Qty: 3 Bottle, Refills: 1    Associated Diagnoses: Eustachian tube dysfunction, right; Impacted cerumen of right ear      pantoprazole (PROTONIX) 40 mg tablet Take 1 Tab by mouth daily. Qty: 90 Tab, Refills: 1    Associated Diagnoses: Other dysphagia      tamsulosin (FLOMAX) 0.4 mg capsule Take 1 Cap by mouth daily (after dinner). Qty: 90 Cap, Refills: 1    Associated Diagnoses: Urinary retention      acetaminophen (TYLENOL EXTRA STRENGTH) 500 mg tablet Take 1,000 mg by mouth nightly. Discharge Instructions  - Follow up with primary care physician, Neurology  - Call with any problems or questions. - Take the listed and prescribed medications as directed. - Record BP daily and bring in log to PCP for adjustments as needed in blood pressure medications. - Resume Eliquis & ASA  - Return for worsening symptoms     Activity: PT/OT Eval and Treat  Diet: Cardiac Diet  Discussed with Neurology NP  Patient was seen, examined and stable prior to discharge. 32 minutes was spent in the discharge of this patient; at bedside, explaining the instructions, reviewing notes, discussing the case. Patient verbalizes understanding of the instructions and questions were answered to complete satisfaction. Patient is aware of the need to follow up and take medications as prescribed.     Signed:  Marlene Phillips PA-C, MPAS  2/18/2020  11:42 AM

## 2020-02-18 NOTE — DISCHARGE INSTRUCTIONS
Discharge Instructions  - Follow up with primary care physician, Neurology  - Call with any problems or questions. - Take the listed and prescribed medications as directed. - Record BP daily and bring in log to PCP for adjustments as needed in blood pressure medications. - Resume Eliquis and aspirin  - Return for worsening symptoms       Stroke: After Your Visit     Your Care Instructions     Risk factors for stroke include being overweight, smoking, and sedentary lifestyle. This means that the blood flow to a part of your brain was blocked for some time, which damages the nerve cells in that part of the brain. The part of your body controlled by that part of your brain may not function properly now. The brain is an amazing organ that can heal itself to some degree. The stroke you had damaged part of your brain, but other parts of your brain may take over in some way for the damaged areas. You have already started this process. Going home may be hard for you and your family. The more you can try to do for yourself, the better. Remember to take each day one at a time. Follow-up care is a key part of your treatment and safety. Be sure to make and go to all appointments, and call your doctor if you are having problems. Its also a good idea to know your test results and keep a list of the medicines you take. How can you care for yourself at home? Enter a stroke rehabilitation (rehab) program, if your doctor recommends it. Physical, speech, and occupational therapies can help you manage bathing, dressing, eating, and other basics of daily living. Eat a heart-healthy diet that is low in cholesterol, saturated fat, and salt. Eat lots of fresh fruits and vegetables and foods high in fiber. Increase your activities slowly. Take short rest breaks when you get tired. Gradually increase the amount you walk. Start out by walking a little more than you did the day before.   Do not drive until your doctor says it is okay. It is normal to feel sad or depressed after a stroke. If the blues last, talk to your doctor. If you are having problems with urine leakage, go to the bathroom at regular times, including when you first wake up and at bedtime. Also, limit fluids after dinner. If you are constipated, drink plenty of fluids, enough so that your urine is light yellow or clear like water. If you have kidney, heart, or liver disease and have to limit fluids, talk with your doctor before you increase the amount of fluids you drink. Set up a regular time for using the toilet. If you continue to have constipation, your doctor may suggest using a bulking agent, such as Metamucil, or a stool softener, laxative, or enema. Medicines  Take your medicines exactly as prescribed. Call your doctor if you think you are having a problem with your medicine. You may be taking several medicines. ACE (angiotensin-converting enzyme) inhibitors, angiotensin II receptor blockers (ARBs), beta-blockers, diuretics (water pills), and calcium channel blockers control your blood pressure. Statins help lower cholesterol. Your doctor may also prescribe medicines for depression, pain, sleep problems, anxiety, or agitation. If your doctor has given you medicine that prevents blood clots, such as warfarin (Coumadin), aspirin combined with extended-release dipyridamole (Aggrenox), clopidogrel (Plavix), or aspirin to prevent another stroke, you should:  Tell your dentist, pharmacist, and other health professionals that you take these medicines. Watch for unusual bruising or bleeding, such as blood in your urine, red or black stools, or bleeding from your nose or gums. Get regular blood tests to check your clotting time if you are taking Coumadin. Wear medical alert jewelry that says you take blood thinners. You can buy this at most drugstores.   Do not take any over-the-counter medicines or herbal products without talking to your doctor first.  If you take birth control pills or hormone replacement therapy, talk to your doctor about whether they are right for you. For family members and caregivers  Make the home safe. Set up a room so that your loved one does not have to climb stairs. Be sure the bathroom is on the same floor. Move throw rugs and furniture that could cause falls, and make sure that the lighting is good. Put grab bars and seats in tubs and showers. Find out what your loved one can do and what he or she needs help with. Try not to do things for your loved one that your loved one can do on his or her own. Help him or her learn and practice new skills. Visit and talk with your loved one often. Try doing activities together that you both enjoy, such as playing cards or board games. Keep in touch with your loved one's friends as much as you can, and encourage them to visit. Take care of yourself. Do not try to do everything yourself. Ask other family members to help. Eat well, get enough rest, and take time to do things that you enjoy. Keep up with your own doctor visits, and make sure to take your medicines regularly. Get out of the house as much as you can. Join a local support group. Find out if you qualify for home health care visits to help with rehab or for adult day care. When should you call for help? Call 911 anytime you think you may need emergency care. For example, call if:  You have signs of another stroke. These may include:  Sudden numbness, paralysis, or weakness in your face, arm, or leg, especially on only one side of your body. New problems with walking or balance. Sudden vision changes. Drooling or slurred speech. New problems speaking or understanding simple statements, or you feel confused. A sudden, severe headache that is different from past headaches. Call 911 even if these symptoms go away in a few minutes. You cough up blood. You vomit blood or what looks like coffee grounds.   You pass maroon or very bloody stools. Call your doctor now or seek immediate medical care if:  You have new bruises or blood spots under your skin. You have a nosebleed. Your gums bleed when you brush your teeth. You have blood in your urine. Your stools are black and tarlike or have streaks of blood. You have vaginal bleeding when you are not having your period, or heavy period bleeding. You have new symptoms that may be related to your stroke, such as falls or trouble swallowing. Watch closely for changes in your health, and be sure to contact your doctor if you have any problems. Where can you learn more? Go to [employer]    Enter C294  in the search box to learn more about \"Stroke: After Your Visit\". © 0380-2731 [employer]wise, [employer]. Care instructions adapted under license by [employer] (which disclaims liability or warranty for this information). This care instruction is for use with your licensed healthcare professional. If you have questions about a medical condition or this instruction, always ask your healthcare professional. Yajaira Romero any warranty or liability for your use of this information.

## 2020-02-18 NOTE — PROGRESS NOTES
Pt is an 79 yo male admitted due to left sided weakness. SW met with pt to discuss dc needs. PT has evaluated pt and recommended outpt PT services. Pt is in agreement with this recommendation and agreeable to referral to [address] [place] [address]. Order received and faxed to their intake department. No other dc needs or concerns identified or reported. Pt was medically cleared for dc to home today. Care Management Interventions  PCP Verified by CM: Yes  Last Visit to PCP: 02/07/20  Mode of Transport at Discharge: Other (see comment)(family)  Transition of Care Consult (CM Consult): Discharge Planning  Discharge Durable Medical Equipment: No(has cane and rolling walker)  Physical Therapy Consult: Yes  Occupational Therapy Consult: Yes  Speech Therapy Consult: Yes  Current Support Network: Own Home, Family Lives Nearby, Lives Alone  Confirm Follow Up Transport: Family  The Plan for Transition of Care is Related to the Following Treatment Goals :  Outpt PT for balance and strengthening to return to PLOF  The Patient and/or Patient Representative was Provided with a Choice of Provider and Agrees with the Discharge Plan?: Yes  Discharge Location  Discharge Placement: Home with outpatient services([facility])

## 2020-02-18 NOTE — PROGRESS NOTES
02/17/20 1956   NIH Stroke Scale   Interval Other (comment)  (Dual NIH with LEXI Robison)   LOC 0   LOC Questions 0   LOC Commands 0   Best Gaze 0   Visual 0   Facial Palsy 0   Motor Right Arm 0   Motor Left Arm 0   Motor Right Leg 0   Motor Left Leg 0   Limb Ataxia 0   Sensory 0   Best Language 0   Dysarthria 0   Extinction and Inattention 0   Total 0

## 2020-02-18 NOTE — PROGRESS NOTES
Neurology Daily Progress Note     Assessment:     80year old male initially seen as a code S with acute onset of left sided weakness, now resolved. Likely TIA secondary to discontinuing Eliquis. Eliquis has been restarted. No new infarct demonstrated on MRI. Plan:     · Continue Eliquis   · Continue statin   · Telemetry  · PT/OT/ST  · BP management -normotensive with long-term goal <140/90  ·   Subjective: Interval history:    Patient doing well. He is back to neurologic baseline. MRI demonstrates a stable diffusion abnormality within the carli, similar in appearance to previous MRI. History:    Blanquita Dukes is a 80 y.o. male who is being seen for stroke symptoms . Review of systems negative with exception of pertinent positives and negatives noted above.        Objective:     Vitals:    02/18/20 0000 02/18/20 0400 02/18/20 0800 02/18/20 1200   BP: 103/62 112/64 113/59 137/69   Pulse: 62 60 70 62   Resp: 16 16 17 18   Temp: 98.2 °F (36.8 °C) 98.2 °F (36.8 °C) 97.9 °F (36.6 °C) 98.1 °F (36.7 °C)   SpO2: 91% 94%  95%   Weight:       Height:              Current Facility-Administered Medications:     tuberculin injection 5 Units, 5 Units, IntraDERMal, ONCE, Nancy Jin DO, 5 Units at 02/17/20 1627    ondansetron (ZOFRAN) injection 4 mg, 4 mg, IntraVENous, Q4H PRN, Maira Vaughn DO    aspirin chewable tablet 81 mg, 81 mg, Oral, DAILY, Maira Vaughn DO, 81 mg at 02/18/20 0901    acetaminophen (TYLENOL) tablet 650 mg, 650 mg, Oral, Q4H PRN, Maira Vaughn DO, 650 mg at 02/17/20 1745    labetaloL (NORMODYNE;TRANDATE) injection 5 mg, 5 mg, IntraVENous, Q10MIN PRN, Maira Vaughn DO    polyethylene glycol (MIRALAX) packet 17 g, 17 g, Oral, DAILY PRN, Maira Vaughn DO    allopurinoL (ZYLOPRIM) tablet 300 mg, 300 mg, Oral, DAILY, Nancy Jin DO, 300 mg at 02/18/20 0901    atorvastatin (LIPITOR) tablet 40 mg, 40 mg, Oral, QHS, Nancy Jin DO, 40 mg at 02/17/20 2125    finasteride (PROSCAR) tablet 5 mg, 5 mg, Oral, DAILY, Nancy Jin, DO, 5 mg at 02/18/20 0901    pantoprazole (PROTONIX) tablet 40 mg, 40 mg, Oral, DAILY, Marcelo Colder, DO, 40 mg at 02/18/20 0901    tamsulosin (FLOMAX) capsule 0.4 mg, 0.4 mg, Oral, PCD, Marcelo Colder, DO, 0.4 mg at 02/17/20 1745    naloxone [facility]) injection 0.4 mg, 0.4 mg, IntraVENous, PRN, Marcelo Colder, DO    apixaban (ELIQUIS) tablet 2.5 mg, 2.5 mg, Oral, BID, Marcelo Colder, DO, 2.5 mg at 02/18/20 0901    lip protectant (BLISTEX) ointment 1 Each, 1 Each, Topical, PRN, Marcelo Colder, DO, 1 Each at 02/17/20 1745    Current Outpatient Medications:     atorvastatin (LIPITOR) 40 mg tablet, Take 1 Tab by mouth nightly., Disp: 90 Tab, Rfl: 1    aspirin delayed-release 81 mg tablet, Take 1 Tab by mouth daily. , Disp: 30 Tab, Rfl: 0    apixaban (ELIQUIS) 2.5 mg tablet, Take 1 Tab by mouth two (2) times a day., Disp: 60 Tab, Rfl: 0    allopurinol (ZYLOPRIM) 300 mg tablet, Take 1 Tab by mouth daily. , Disp: 90 Tab, Rfl: 1    amLODIPine (NORVASC) 5 mg tablet, Take 1 Tab by mouth daily. , Disp: 90 Tab, Rfl: 1    benazepril (LOTENSIN) 40 mg tablet, Take 1 Tab by mouth daily. , Disp: 90 Tab, Rfl: 1    finasteride (PROSCAR) 5 mg tablet, Take 1 Tab by mouth daily. , Disp: 90 Tab, Rfl: 1    fluticasone propionate (FLONASE) 50 mcg/actuation nasal spray, 2 Sprays by Both Nostrils route daily. , Disp: 3 Bottle, Rfl: 1    pantoprazole (PROTONIX) 40 mg tablet, Take 1 Tab by mouth daily. , Disp: 90 Tab, Rfl: 1    tamsulosin (FLOMAX) 0.4 mg capsule, Take 1 Cap by mouth daily (after dinner). , Disp: 90 Cap, Rfl: 1    acetaminophen (TYLENOL EXTRA STRENGTH) 500 mg tablet, Take 1,000 mg by mouth nightly., Disp: , Rfl:     Recent Results (from the past 12 hour(s))   CBC W/O DIFF    Collection Time: 02/18/20  5:24 AM   Result Value Ref Range    WBC 8.0 4.3 - 11.1 K/uL    RBC 3.46 (L) 4.23 - 5.6 M/uL    HGB 11.5 (L) 13.6 - 17.2 g/dL    HCT 36.3 (L) 41.1 - 50.3 %    .9 (H) 79.6 - 97.8 FL    MCH 33.2 (H) 26.1 - 32.9 PG    MCHC 31.7 31.4 - 35.0 g/dL    RDW 14.1 11.9 - 14.6 %    PLATELET 432 (L) 009 - 450 K/uL    MPV 12.2 9.4 - 12.3 FL    ABSOLUTE NRBC 0.00 0.0 - 0.2 K/uL   METABOLIC PANEL, BASIC    Collection Time: 02/18/20  5:24 AM   Result Value Ref Range    Sodium 143 136 - 145 mmol/L    Potassium 5.0 3.5 - 5.1 mmol/L    Chloride 117 (H) 98 - 107 mmol/L    CO2 20 (L) 21 - 32 mmol/L    Anion gap 6 (L) 7 - 16 mmol/L    Glucose 91 65 - 100 mg/dL    BUN 35 (H) 8 - 23 MG/DL    Creatinine 1.79 (H) 0.8 - 1.5 MG/DL    GFR est AA 46 (L) >60 ml/min/1.73m2    GFR est non-AA 38 (L) >60 ml/min/1.73m2    Calcium 9.0 8.3 - 10.4 MG/DL     MRI Results (most recent):  Results from Hospital Encounter encounter on 02/17/20   MRA BRAIN WO CONT    Narrative Head MRA    History: Left sided weakness, dysarthria    Sequences: Contiguous axial 3D time-of-flight images of the head were used to  generate maximum intensity projection images of the [place] and  vertebrobasilar systems. Comparison: None    Findings:    Signal within the petrous, cavernous, and supraclinoid internal carotid arteries  is equal and symmetric. Signal within the anterior and middle cerebral arteries  is equal and symmetric. The V4 segment of the left vertebral artery demonstrates absent signal. The  vessel appears to terminate in PICA. The basilar artery and posterior cerebral  arteries demonstrate equal and symmetric signal. There is a fetal origin left  posterior cerebral artery. No definite evidence of aneurysm and/or vascular malformation. Impression IMPRESSION:  1. No evidence of large vessel occlusive disease or high-grade stenosis. 2. Absent signal in the left vertebral artery however this appears to terminate  in PICA. Physical Exam:  General - Well developed, well nourished, in no apparent distress. Pleasant and conversent.    HEENT - Normocephalic, atraumatic. Conjunctiva are clear. Neck - Supple without masses  Extremities - Peripheral pulses intact. No edema and no rashes. Neurological examination - Comprehension, attention, memory and reasoning are intact. Language and speech are normal.   On cranial nerve examination, (II, III, IV, VI) pupils are equal, round, and reactive to light. Visual acuity is adequate. Visual fields are full to finger confrontation. Extraocular motility is normal. (V, VII) Face is symmetric and sensation is intact to light touch. (VIII) Hearing is intact. (IX, X) Palate and uvula elevate symmetrically. Voice is normal. (XI) Shoulder shrug is strong and equal bilaterally. (XII)Tongue is midline. Motor examination - There is normal muscle tone and bulk. Power is full throughout. Muscle stretch reflexes are normoactive and there are no pathological reflexes present. Sensation is intact to light touch, pinprick, vibration and proprioception in all extremities. Cerebellar examination is normal.     Signed By: Leander Hernandez NP     February 18, 2020    Attending note    Patient seen and examined. 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 Current event occurred after he had discontinued Eliquis    If an alternative protocol is desired that that was employed at the [facility] trial could be considered in that he probably has vascular disease in 2 separate territoriesthat protocol involves the usage of low-dose Xarelto and aspirin.

## 2020-11-03 PROBLEM — R06.09 DOE (DYSPNEA ON EXERTION): Status: ACTIVE | Noted: 2020-11-03

## 2020-11-30 ENCOUNTER — HOSPITAL ENCOUNTER (OUTPATIENT)
Dept: LAB | Age: 85
Discharge: HOME | End: 2020-11-30
Payer: MEDICARE

## 2020-11-30 DIAGNOSIS — I10 ESSENTIAL HYPERTENSION: ICD-10-CM

## 2020-11-30 DIAGNOSIS — R06.09 DOE (DYSPNEA ON EXERTION): ICD-10-CM

## 2020-11-30 LAB
ANION GAP SERPL CALC-SCNC: 4 MMOL/L (ref 7–16)
BASOPHILS # BLD: 0.1 K/UL (ref 0–0.2)
BASOPHILS NFR BLD: 1 % (ref 0–2)
BUN SERPL-MCNC: 31 MG/DL (ref 8–23)
CALCIUM SERPL-MCNC: 9.8 MG/DL (ref 8.3–10.4)
CHLORIDE SERPL-SCNC: 113 MMOL/L (ref 98–107)
CO2 SERPL-SCNC: 23 MMOL/L (ref 21–32)
CREAT SERPL-MCNC: 2.04 MG/DL (ref 0.8–1.5)
DIFFERENTIAL METHOD BLD: ABNORMAL
EOSINOPHIL # BLD: 0.6 K/UL (ref 0–0.8)
EOSINOPHIL NFR BLD: 7 % (ref 0.5–7.8)
ERYTHROCYTE [DISTWIDTH] IN BLOOD BY AUTOMATED COUNT: 14.8 % (ref 11.9–14.6)
GLUCOSE SERPL-MCNC: 95 MG/DL (ref 65–100)
HCT VFR BLD AUTO: 41.1 % (ref 41.1–50.3)
HGB BLD-MCNC: 12.8 G/DL (ref 13.6–17.2)
IMM GRANULOCYTES # BLD AUTO: 0 K/UL (ref 0–0.5)
IMM GRANULOCYTES NFR BLD AUTO: 0 % (ref 0–5)
LYMPHOCYTES # BLD: 2.2 K/UL (ref 0.5–4.6)
LYMPHOCYTES NFR BLD: 27 % (ref 13–44)
MCH RBC QN AUTO: 32.9 PG (ref 26.1–32.9)
MCHC RBC AUTO-ENTMCNC: 31.1 G/DL (ref 31.4–35)
MCV RBC AUTO: 105.7 FL (ref 79.6–97.8)
MONOCYTES # BLD: 0.6 K/UL (ref 0.1–1.3)
MONOCYTES NFR BLD: 7 % (ref 4–12)
NEUTS SEG # BLD: 4.7 K/UL (ref 1.7–8.2)
NEUTS SEG NFR BLD: 58 % (ref 43–78)
NRBC # BLD: 0 K/UL (ref 0–0.2)
PLATELET # BLD AUTO: 168 K/UL (ref 150–450)
PMV BLD AUTO: 12.6 FL (ref 9.4–12.3)
POTASSIUM SERPL-SCNC: 5.2 MMOL/L (ref 3.5–5.1)
RBC # BLD AUTO: 3.89 M/UL (ref 4.23–5.6)
SODIUM SERPL-SCNC: 140 MMOL/L (ref 138–145)
WBC # BLD AUTO: 8.1 K/UL (ref 4.3–11.1)

## 2020-11-30 PROCEDURE — 80048 BASIC METABOLIC PNL TOTAL CA: CPT

## 2020-11-30 PROCEDURE — 36415 COLL VENOUS BLD VENIPUNCTURE: CPT

## 2020-11-30 PROCEDURE — 85025 COMPLETE CBC W/AUTO DIFF WBC: CPT

## 2020-12-08 PROBLEM — R60.0 LOCALIZED EDEMA: Status: ACTIVE | Noted: 2020-12-08

## 2021-08-03 PROBLEM — I63.9 CVA (CEREBRAL VASCULAR ACCIDENT) (HCC): Status: RESOLVED | Noted: 2020-01-31 | Resolved: 2021-08-03

## 2022-01-01 ENCOUNTER — OFFICE VISIT (OUTPATIENT)
Dept: INTERNAL MEDICINE CLINIC | Facility: CLINIC | Age: 87
End: 2022-01-01
Payer: MEDICARE

## 2022-01-01 ENCOUNTER — APPOINTMENT (OUTPATIENT)
Dept: CT IMAGING | Age: 87
End: 2022-01-01
Payer: MEDICARE

## 2022-01-01 ENCOUNTER — APPOINTMENT (OUTPATIENT)
Dept: MRI IMAGING | Age: 87
DRG: 377 | End: 2022-01-01
Payer: MEDICARE

## 2022-01-01 ENCOUNTER — TELEPHONE (OUTPATIENT)
Dept: INTERNAL MEDICINE CLINIC | Facility: CLINIC | Age: 87
End: 2022-01-01

## 2022-01-01 ENCOUNTER — CARE COORDINATION (OUTPATIENT)
Dept: OTHER | Facility: CLINIC | Age: 87
End: 2022-01-01

## 2022-01-01 ENCOUNTER — APPOINTMENT (OUTPATIENT)
Dept: GENERAL RADIOLOGY | Age: 87
DRG: 377 | End: 2022-01-01
Payer: MEDICARE

## 2022-01-01 ENCOUNTER — APPOINTMENT (OUTPATIENT)
Dept: CT IMAGING | Age: 87
DRG: 377 | End: 2022-01-01
Payer: MEDICARE

## 2022-01-01 ENCOUNTER — HOSPITAL ENCOUNTER (INPATIENT)
Age: 87
LOS: 5 days | Discharge: HOME HEALTH | DRG: 377 | End: 2022-06-21
Attending: EMERGENCY MEDICINE | Admitting: FAMILY MEDICINE
Payer: MEDICARE

## 2022-01-01 ENCOUNTER — HOSPITAL ENCOUNTER (EMERGENCY)
Age: 87
Discharge: HOME | End: 2022-06-12
Attending: EMERGENCY MEDICINE
Payer: MEDICARE

## 2022-01-01 VITALS
SYSTOLIC BLOOD PRESSURE: 106 MMHG | WEIGHT: 199 LBS | TEMPERATURE: 97.6 F | BODY MASS INDEX: 30.16 KG/M2 | DIASTOLIC BLOOD PRESSURE: 57 MMHG | HEIGHT: 68 IN | HEART RATE: 101 BPM | OXYGEN SATURATION: 98 %

## 2022-01-01 VITALS
TEMPERATURE: 99.1 F | HEIGHT: 67 IN | BODY MASS INDEX: 29.76 KG/M2 | SYSTOLIC BLOOD PRESSURE: 108 MMHG | DIASTOLIC BLOOD PRESSURE: 61 MMHG | WEIGHT: 189.6 LBS | OXYGEN SATURATION: 98 % | HEART RATE: 81 BPM

## 2022-01-01 VITALS
HEIGHT: 67 IN | OXYGEN SATURATION: 98 % | BODY MASS INDEX: 32.07 KG/M2 | RESPIRATION RATE: 18 BRPM | DIASTOLIC BLOOD PRESSURE: 60 MMHG | TEMPERATURE: 97.9 F | SYSTOLIC BLOOD PRESSURE: 134 MMHG | WEIGHT: 204.3 LBS | HEART RATE: 59 BPM

## 2022-01-01 VITALS
SYSTOLIC BLOOD PRESSURE: 122 MMHG | OXYGEN SATURATION: 100 % | DIASTOLIC BLOOD PRESSURE: 67 MMHG | RESPIRATION RATE: 11 BRPM | TEMPERATURE: 98.2 F | WEIGHT: 165 LBS | BODY MASS INDEX: 25.9 KG/M2 | HEIGHT: 67 IN | HEART RATE: 68 BPM

## 2022-01-01 DIAGNOSIS — K57.90 DIVERTICULOSIS: ICD-10-CM

## 2022-01-01 DIAGNOSIS — R63.4 WEIGHT LOSS: Primary | ICD-10-CM

## 2022-01-01 DIAGNOSIS — R53.82 CHRONIC FATIGUE: ICD-10-CM

## 2022-01-01 DIAGNOSIS — N17.9 AKI (ACUTE KIDNEY INJURY) (HCC): ICD-10-CM

## 2022-01-01 DIAGNOSIS — R63.0 POOR APPETITE: ICD-10-CM

## 2022-01-01 DIAGNOSIS — R53.1 WEAKNESS: ICD-10-CM

## 2022-01-01 DIAGNOSIS — R43.2 TASTE SENSE ALTERED: ICD-10-CM

## 2022-01-01 DIAGNOSIS — I95.9 HYPOTENSION, UNSPECIFIED HYPOTENSION TYPE: Primary | ICD-10-CM

## 2022-01-01 DIAGNOSIS — R43.8 BAD TASTE IN MOUTH: ICD-10-CM

## 2022-01-01 DIAGNOSIS — N13.30 HYDROURETERONEPHROSIS: ICD-10-CM

## 2022-01-01 DIAGNOSIS — R33.9 URINARY RETENTION: ICD-10-CM

## 2022-01-01 DIAGNOSIS — K57.32 DIVERTICULITIS OF COLON: ICD-10-CM

## 2022-01-01 DIAGNOSIS — R42 DIZZINESS: ICD-10-CM

## 2022-01-01 DIAGNOSIS — E44.0 MODERATE PROTEIN-CALORIE MALNUTRITION (HCC): ICD-10-CM

## 2022-01-01 DIAGNOSIS — R63.4 WEIGHT LOSS: ICD-10-CM

## 2022-01-01 DIAGNOSIS — A41.9 SEPSIS, DUE TO UNSPECIFIED ORGANISM, UNSPECIFIED WHETHER ACUTE ORGAN DYSFUNCTION PRESENT (HCC): ICD-10-CM

## 2022-01-01 DIAGNOSIS — S51.812A SKIN TEAR OF FOREARM WITHOUT COMPLICATION, LEFT, INITIAL ENCOUNTER: Primary | ICD-10-CM

## 2022-01-01 DIAGNOSIS — H46.9 OPTIC NEUROPATHY, BILATERAL: ICD-10-CM

## 2022-01-01 DIAGNOSIS — E44.0 MODERATE PROTEIN-CALORIE MALNUTRITION (HCC): Chronic | ICD-10-CM

## 2022-01-01 DIAGNOSIS — C61 PROSTATE CANCER (HCC): ICD-10-CM

## 2022-01-01 DIAGNOSIS — R73.09 ELEVATED GLUCOSE LEVEL: ICD-10-CM

## 2022-01-01 DIAGNOSIS — M31.6 GCA (GIANT CELL ARTERITIS) (HCC): ICD-10-CM

## 2022-01-01 DIAGNOSIS — Z85.46 HISTORY OF PROSTATE CANCER: ICD-10-CM

## 2022-01-01 LAB
ABO + RH BLD: NORMAL
ALBUMIN SERPL-MCNC: 3.1 G/DL (ref 3.2–4.6)
ALBUMIN SERPL-MCNC: 3.9 G/DL (ref 3.2–4.6)
ALBUMIN SERPL-MCNC: 3.9 G/DL (ref 3.2–4.6)
ALBUMIN/GLOB SERPL: 1 {RATIO} (ref 1.2–3.5)
ALBUMIN/GLOB SERPL: 1.3 {RATIO}
ALBUMIN/GLOB SERPL: 1.3 {RATIO} (ref 1.2–3.5)
ALP SERPL-CCNC: 104 U/L (ref 50–136)
ALP SERPL-CCNC: 108 U/L (ref 45–117)
ALP SERPL-CCNC: 136 U/L (ref 50–136)
ALT SERPL-CCNC: 10 U/L (ref 13–61)
ALT SERPL-CCNC: 15 U/L (ref 12–65)
ALT SERPL-CCNC: 17 U/L (ref 12–65)
ANION GAP SERPL CALC-SCNC: 10 MMOL/L (ref 7–16)
ANION GAP SERPL CALC-SCNC: 11 MMOL/L (ref 7–16)
ANION GAP SERPL CALC-SCNC: 5 MMOL/L (ref 7–16)
ANION GAP SERPL CALC-SCNC: 7 MMOL/L (ref 7–16)
ANION GAP SERPL CALC-SCNC: 7 MMOL/L (ref 7–16)
ANION GAP SERPL CALC-SCNC: 8 MMOL/L (ref 7–16)
ANION GAP SERPL CALC-SCNC: 9 MMOL/L (ref 7–16)
ANION GAP SERPL CALC-SCNC: 9 MMOL/L (ref 7–16)
APPEARANCE UR: CLEAR
AST SERPL-CCNC: 11 U/L (ref 15–37)
AST SERPL-CCNC: 16 U/L (ref 15–37)
AST SERPL-CCNC: 17 U/L (ref 15–37)
BACTERIA SPEC CULT: NORMAL
BACTERIA SPEC CULT: NORMAL
BACTERIA URNS QL MICRO: NEGATIVE /HPF
BASOPHILS # BLD: 0 K/UL (ref 0–0.2)
BASOPHILS # BLD: 0 K/UL (ref 0–0.2)
BASOPHILS # BLD: 0.1 K/UL (ref 0–0.2)
BASOPHILS # BLD: 0.1 K/UL (ref 0–0.2)
BASOPHILS NFR BLD: 0 % (ref 0–2)
BASOPHILS NFR BLD: 0 % (ref 0–2)
BASOPHILS NFR BLD: 1 % (ref 0–2)
BASOPHILS NFR BLD: 1 % (ref 0–2)
BILIRUB SERPL-MCNC: 0.4 MG/DL (ref 0.2–1.1)
BILIRUB SERPL-MCNC: 0.4 MG/DL (ref 0.2–1.1)
BILIRUB SERPL-MCNC: 0.6 MG/DL (ref 0.2–1.1)
BILIRUB UR QL: NEGATIVE
BLOOD GROUP ANTIBODIES SERPL: NORMAL
BUN SERPL-MCNC: 26 MG/DL (ref 8–23)
BUN SERPL-MCNC: 32 MG/DL (ref 8–23)
BUN SERPL-MCNC: 33 MG/DL (ref 8–23)
BUN SERPL-MCNC: 34 MG/DL (ref 8–23)
BUN SERPL-MCNC: 36 MG/DL (ref 8–23)
BUN SERPL-MCNC: 37 MG/DL (ref 8–23)
BUN SERPL-MCNC: 44 MG/DL (ref 7–18)
BUN SERPL-MCNC: 50 MG/DL (ref 8–23)
CALCIUM SERPL-MCNC: 10.3 MG/DL (ref 8.3–10.4)
CALCIUM SERPL-MCNC: 10.8 MG/DL (ref 8.3–10.4)
CALCIUM SERPL-MCNC: 9.3 MG/DL (ref 8.3–10.4)
CALCIUM SERPL-MCNC: 9.5 MG/DL (ref 8.3–10.4)
CALCIUM SERPL-MCNC: 9.6 MG/DL (ref 8.3–10.4)
CALCIUM SERPL-MCNC: 9.6 MG/DL (ref 8.3–10.4)
CALCIUM SERPL-MCNC: 9.7 MG/DL (ref 8.3–10.4)
CALCIUM SERPL-MCNC: 9.9 MG/DL (ref 8.3–10.4)
CASTS URNS QL MICRO: ABNORMAL /LPF
CHLORIDE SERPL-SCNC: 107 MMOL/L (ref 98–107)
CHLORIDE SERPL-SCNC: 110 MMOL/L (ref 98–107)
CHLORIDE SERPL-SCNC: 112 MMOL/L (ref 98–107)
CHLORIDE SERPL-SCNC: 112 MMOL/L (ref 98–107)
CHLORIDE SERPL-SCNC: 114 MMOL/L (ref 98–107)
CHLORIDE SERPL-SCNC: 114 MMOL/L (ref 98–107)
CHLORIDE SERPL-SCNC: 116 MMOL/L (ref 98–107)
CHLORIDE SERPL-SCNC: 116 MMOL/L (ref 98–107)
CO2 SERPL-SCNC: 15 MMOL/L (ref 21–32)
CO2 SERPL-SCNC: 16 MMOL/L (ref 21–32)
CO2 SERPL-SCNC: 17 MMOL/L (ref 21–32)
CO2 SERPL-SCNC: 19 MMOL/L (ref 21–32)
CO2 SERPL-SCNC: 20 MMOL/L (ref 21–32)
CO2 SERPL-SCNC: 21 MMOL/L (ref 21–32)
COLOR UR: ABNORMAL
CREAT SERPL-MCNC: 1.8 MG/DL (ref 0.8–1.5)
CREAT SERPL-MCNC: 2.14 MG/DL (ref 0.8–1.5)
CREAT SERPL-MCNC: 2.2 MG/DL (ref 0.8–1.5)
CREAT SERPL-MCNC: 2.2 MG/DL (ref 0.8–1.5)
CREAT SERPL-MCNC: 2.3 MG/DL (ref 0.8–1.5)
CREAT SERPL-MCNC: 2.5 MG/DL (ref 0.8–1.5)
CRP SERPL-MCNC: 0.6 MG/DL (ref 0–0.9)
CRP SERPL-MCNC: 7.1 MG/DL (ref 0–0.9)
DIFFERENTIAL METHOD BLD: ABNORMAL
EKG ATRIAL RATE: 94 BPM
EKG DIAGNOSIS: NORMAL
EKG P AXIS: 54 DEGREES
EKG P-R INTERVAL: 159 MS
EKG Q-T INTERVAL: 367 MS
EKG QRS DURATION: 117 MS
EKG QTC CALCULATION (BAZETT): 459 MS
EKG R AXIS: -77 DEGREES
EKG T AXIS: 56 DEGREES
EKG VENTRICULAR RATE: 94 BPM
EOSINOPHIL # BLD: 0.1 K/UL (ref 0–0.8)
EOSINOPHIL # BLD: 0.2 K/UL (ref 0–0.8)
EOSINOPHIL # BLD: 0.3 K/UL (ref 0–0.8)
EOSINOPHIL # BLD: 0.5 K/UL (ref 0–0.8)
EOSINOPHIL NFR BLD: 1 % (ref 0.5–7.8)
EOSINOPHIL NFR BLD: 2 % (ref 0.5–7.8)
EOSINOPHIL NFR BLD: 4 % (ref 0.5–7.8)
EOSINOPHIL NFR BLD: 5 % (ref 0.5–7.8)
EPI CELLS #/AREA URNS HPF: ABNORMAL /HPF
ERYTHROCYTE [DISTWIDTH] IN BLOOD BY AUTOMATED COUNT: 15.4 % (ref 11.9–14.6)
ERYTHROCYTE [DISTWIDTH] IN BLOOD BY AUTOMATED COUNT: 15.5 % (ref 11.9–14.6)
ERYTHROCYTE [DISTWIDTH] IN BLOOD BY AUTOMATED COUNT: 15.6 % (ref 11.9–14.6)
ERYTHROCYTE [DISTWIDTH] IN BLOOD BY AUTOMATED COUNT: 15.6 % (ref 11.9–14.6)
ERYTHROCYTE [DISTWIDTH] IN BLOOD BY AUTOMATED COUNT: 15.7 % (ref 11.9–14.6)
ERYTHROCYTE [DISTWIDTH] IN BLOOD BY AUTOMATED COUNT: 15.9 % (ref 11.9–14.6)
ERYTHROCYTE [DISTWIDTH] IN BLOOD BY AUTOMATED COUNT: 15.9 % (ref 11.9–14.6)
ERYTHROCYTE [DISTWIDTH] IN BLOOD BY AUTOMATED COUNT: 16.3 % (ref 11.9–14.6)
ERYTHROCYTE [SEDIMENTATION RATE] IN BLOOD: 18 MM/HR
ERYTHROCYTE [SEDIMENTATION RATE] IN BLOOD: 29 MM/HR
EST. AVERAGE GLUCOSE BLD GHB EST-MCNC: 100 MG/DL
FERRITIN SERPL-MCNC: 82 NG/ML (ref 8–388)
FOLATE SERPL-MCNC: 5.6 NG/ML (ref 3.1–17.5)
FOLATE SERPL-MCNC: 5.8 NG/ML (ref 3.1–17.5)
GLOBULIN SER CALC-MCNC: 3 G/DL (ref 2.3–3.5)
GLOBULIN SER CALC-MCNC: 3.1 G/DL (ref 2.3–3.5)
GLOBULIN SER CALC-MCNC: 3.1 G/DL (ref 2.3–3.5)
GLUCOSE SERPL-MCNC: 102 MG/DL (ref 65–100)
GLUCOSE SERPL-MCNC: 121 MG/DL (ref 65–100)
GLUCOSE SERPL-MCNC: 140 MG/DL (ref 65–100)
GLUCOSE SERPL-MCNC: 168 MG/DL (ref 65–100)
GLUCOSE SERPL-MCNC: 171 MG/DL (ref 65–100)
GLUCOSE SERPL-MCNC: 210 MG/DL (ref 65–100)
GLUCOSE SERPL-MCNC: 86 MG/DL (ref 65–100)
GLUCOSE SERPL-MCNC: 95 MG/DL (ref 65–100)
GLUCOSE UR STRIP.AUTO-MCNC: NEGATIVE MG/DL
HBA1C MFR BLD: 5.1 % (ref 4.2–6.3)
HCT VFR BLD AUTO: 31.2 % (ref 41.1–50.3)
HCT VFR BLD AUTO: 32.2 % (ref 41.1–50.3)
HCT VFR BLD AUTO: 32.7 % (ref 41.1–50.3)
HCT VFR BLD AUTO: 32.7 % (ref 41.1–50.3)
HCT VFR BLD AUTO: 32.8 % (ref 41.1–50.3)
HCT VFR BLD AUTO: 33 % (ref 41.1–50.3)
HCT VFR BLD AUTO: 37.9 % (ref 41.1–50.3)
HCT VFR BLD AUTO: 39 % (ref 41.1–50.3)
HCT VFR BLD AUTO: 40.6 % (ref 41.1–50.3)
HCV AB S/CO SERPL IA: <0.1 S/CO RATIO (ref 0–0.9)
HCV AB SERPL QL IA: NORMAL
HEMOCCULT STL QL: NEGATIVE
HGB BLD-MCNC: 10.3 G/DL (ref 13.6–17.2)
HGB BLD-MCNC: 10.3 G/DL (ref 13.6–17.2)
HGB BLD-MCNC: 10.4 G/DL (ref 13.6–17.2)
HGB BLD-MCNC: 10.7 G/DL (ref 13.6–17.2)
HGB BLD-MCNC: 10.8 G/DL (ref 13.6–17.2)
HGB BLD-MCNC: 11.9 G/DL (ref 13.6–17.2)
HGB BLD-MCNC: 12.5 G/DL (ref 13.6–17.2)
HGB BLD-MCNC: 12.6 G/DL (ref 13.6–17.2)
HGB BLD-MCNC: 9.9 G/DL (ref 13.6–17.2)
HGB UR QL STRIP: NEGATIVE
HIV 1+2 AB+HIV1 P24 AG SERPL QL IA: NONREACTIVE
HIV 1/2 RESULT COMMENT: NORMAL
IMM GRANULOCYTES # BLD AUTO: 0 K/UL (ref 0–0.5)
IMM GRANULOCYTES # BLD AUTO: 0.1 K/UL (ref 0–0.5)
IMM GRANULOCYTES NFR BLD AUTO: 0 % (ref 0–5)
IMM GRANULOCYTES NFR BLD AUTO: 0 % (ref 0–5)
IMM GRANULOCYTES NFR BLD AUTO: 1 % (ref 0–5)
IMM GRANULOCYTES NFR BLD AUTO: 1 % (ref 0–5)
INR PPP: 1.2
IRON SATN MFR SERPL: 5 %
IRON SERPL-MCNC: 15 UG/DL (ref 35–150)
KETONES UR QL STRIP.AUTO: NEGATIVE MG/DL
LACTATE SERPL-SCNC: 1.9 MMOL/L (ref 0.4–2)
LACTATE SERPL-SCNC: 3.1 MMOL/L (ref 0.4–2)
LEUKOCYTE ESTERASE UR QL STRIP.AUTO: NEGATIVE
LIPASE SERPL-CCNC: 92 U/L (ref 73–393)
LYMPHOCYTES # BLD: 1.2 K/UL (ref 0.5–4.6)
LYMPHOCYTES # BLD: 1.3 K/UL (ref 0.5–4.6)
LYMPHOCYTES NFR BLD: 14 % (ref 13–44)
LYMPHOCYTES NFR BLD: 16 % (ref 13–44)
LYMPHOCYTES NFR BLD: 9 % (ref 13–44)
LYMPHOCYTES NFR BLD: 9 % (ref 13–44)
MAGNESIUM SERPL-MCNC: 2 MG/DL (ref 1.8–2.4)
MCH RBC QN AUTO: 31.9 PG (ref 26.1–32.9)
MCH RBC QN AUTO: 32 PG (ref 26.1–32.9)
MCH RBC QN AUTO: 32.4 PG (ref 26.1–32.9)
MCH RBC QN AUTO: 32.5 PG (ref 26.1–32.9)
MCH RBC QN AUTO: 32.9 PG (ref 26.1–32.9)
MCH RBC QN AUTO: 33.3 PG (ref 26.1–32.9)
MCHC RBC AUTO-ENTMCNC: 30.8 G/DL (ref 31.4–35)
MCHC RBC AUTO-ENTMCNC: 31.4 G/DL (ref 31.4–35)
MCHC RBC AUTO-ENTMCNC: 31.7 G/DL (ref 31.4–35)
MCHC RBC AUTO-ENTMCNC: 31.8 G/DL (ref 31.4–35)
MCHC RBC AUTO-ENTMCNC: 32 G/DL (ref 31.4–35)
MCHC RBC AUTO-ENTMCNC: 32.3 G/DL (ref 31.4–35)
MCHC RBC AUTO-ENTMCNC: 32.4 G/DL (ref 31.4–35)
MCHC RBC AUTO-ENTMCNC: 32.9 G/DL (ref 31.4–35)
MCV RBC AUTO: 100 FL (ref 79.6–97.8)
MCV RBC AUTO: 100.3 FL (ref 79.6–97.8)
MCV RBC AUTO: 101 FL (ref 79.6–97.8)
MCV RBC AUTO: 101.3 FL (ref 79.6–97.8)
MCV RBC AUTO: 103.2 FL (ref 79.6–97.8)
MCV RBC AUTO: 103.6 FL (ref 79.6–97.8)
MCV RBC AUTO: 106.8 FL (ref 79.6–97.8)
MCV RBC AUTO: 98.5 FL (ref 79.6–97.8)
MM INDURATION, POC: 0 MM (ref 0–5)
MONOCYTES # BLD: 0.6 K/UL (ref 0.1–1.3)
MONOCYTES # BLD: 0.6 K/UL (ref 0.1–1.3)
MONOCYTES # BLD: 0.8 K/UL (ref 0.1–1.3)
MONOCYTES # BLD: 0.9 K/UL (ref 0.1–1.3)
MONOCYTES NFR BLD: 4 % (ref 4–12)
MONOCYTES NFR BLD: 7 % (ref 4–12)
MONOCYTES NFR BLD: 8 % (ref 4–12)
MONOCYTES NFR BLD: 8 % (ref 4–12)
MUCOUS THREADS URNS QL MICRO: 0 /LPF
NEUTS SEG # BLD: 10.5 K/UL (ref 1.7–8.2)
NEUTS SEG # BLD: 12.7 K/UL (ref 1.7–8.2)
NEUTS SEG # BLD: 5.6 K/UL (ref 1.7–8.2)
NEUTS SEG # BLD: 7 K/UL (ref 1.7–8.2)
NEUTS SEG NFR BLD: 72 % (ref 43–78)
NEUTS SEG NFR BLD: 72 % (ref 43–78)
NEUTS SEG NFR BLD: 83 % (ref 43–78)
NEUTS SEG NFR BLD: 84 % (ref 43–78)
NITRITE UR QL STRIP.AUTO: NEGATIVE
NRBC # BLD: 0 K/UL (ref 0–0.2)
NRBC # BLD: 0.02 K/UL (ref 0–0.2)
NRBC # BLD: 0.03 K/UL (ref 0–0.2)
PH UR STRIP: 5 [PH] (ref 5–9)
PLATELET # BLD AUTO: 139 K/UL (ref 150–450)
PLATELET # BLD AUTO: 140 K/UL (ref 150–450)
PLATELET # BLD AUTO: 149 K/UL (ref 150–450)
PLATELET # BLD AUTO: 159 K/UL (ref 150–450)
PLATELET # BLD AUTO: 160 K/UL (ref 150–450)
PLATELET # BLD AUTO: 169 K/UL (ref 150–450)
PLATELET # BLD AUTO: 180 K/UL (ref 150–450)
PLATELET # BLD AUTO: 183 K/UL (ref 150–450)
PMV BLD AUTO: 11.7 FL (ref 9.4–12.3)
PMV BLD AUTO: 12.1 FL (ref 9.4–12.3)
PMV BLD AUTO: 12.4 FL (ref 9.4–12.3)
PMV BLD AUTO: 12.5 FL (ref 9.4–12.3)
PMV BLD AUTO: 12.5 FL (ref 9.4–12.3)
PMV BLD AUTO: 12.6 FL (ref 9.4–12.3)
PMV BLD AUTO: 12.8 FL (ref 9.4–12.3)
PMV BLD AUTO: 13.1 FL (ref 9.4–12.3)
POTASSIUM SERPL-SCNC: 3.9 MMOL/L (ref 3.5–5.1)
POTASSIUM SERPL-SCNC: 4 MMOL/L (ref 3.5–5.1)
POTASSIUM SERPL-SCNC: 4.3 MMOL/L (ref 3.5–5.1)
POTASSIUM SERPL-SCNC: 4.8 MMOL/L (ref 3.5–5.1)
POTASSIUM SERPL-SCNC: 4.9 MMOL/L (ref 3.5–5.1)
POTASSIUM SERPL-SCNC: 5.4 MMOL/L (ref 3.5–5.1)
POTASSIUM SERPL-SCNC: 5.5 MMOL/L (ref 3.5–5.1)
POTASSIUM SERPL-SCNC: 5.5 MMOL/L (ref 3.5–5.1)
PPD, POC: NEGATIVE
PROCALCITONIN SERPL-MCNC: <0.05 NG/ML (ref 0–0.49)
PROT SERPL-MCNC: 6.1 G/DL (ref 6.3–8.2)
PROT SERPL-MCNC: 7 G/DL (ref 6.3–8.2)
PROT SERPL-MCNC: 7 G/DL (ref 6.4–8.2)
PROT UR STRIP-MCNC: NEGATIVE MG/DL
PROTHROMBIN TIME: 16 SEC (ref 12.6–14.5)
PSA FREE MFR SERPL: NORMAL %
PSA FREE SERPL-MCNC: <0.1 NG/ML
PSA SERPL-MCNC: <0.1 NG/ML
RBC # BLD AUTO: 3.09 M/UL (ref 4.23–5.6)
RBC # BLD AUTO: 3.18 M/UL (ref 4.23–5.6)
RBC # BLD AUTO: 3.26 M/UL (ref 4.23–5.6)
RBC # BLD AUTO: 3.3 M/UL (ref 4.23–5.6)
RBC # BLD AUTO: 3.33 M/UL (ref 4.23–5.6)
RBC # BLD AUTO: 3.66 M/UL (ref 4.23–5.6)
RBC # BLD AUTO: 3.78 M/UL (ref 4.23–5.6)
RBC # BLD AUTO: 3.8 M/UL (ref 4.23–5.6)
RBC #/AREA URNS HPF: ABNORMAL /HPF
SARS-COV-2 RDRP RESP QL NAA+PROBE: NOT DETECTED
SERVICE CMNT-IMP: NORMAL
SERVICE CMNT-IMP: NORMAL
SODIUM SERPL-SCNC: 136 MMOL/L (ref 136–145)
SODIUM SERPL-SCNC: 138 MMOL/L (ref 136–145)
SODIUM SERPL-SCNC: 138 MMOL/L (ref 138–145)
SODIUM SERPL-SCNC: 139 MMOL/L (ref 138–145)
SODIUM SERPL-SCNC: 140 MMOL/L (ref 136–145)
SODIUM SERPL-SCNC: 140 MMOL/L (ref 136–145)
SODIUM SERPL-SCNC: 140 MMOL/L (ref 138–145)
SODIUM SERPL-SCNC: 142 MMOL/L (ref 136–145)
SOURCE: NORMAL
SP GR UR REFRACTOMETRY: 1.01 (ref 1–1.02)
SPECIMEN EXP DATE BLD: NORMAL
TIBC SERPL-MCNC: 315 UG/DL (ref 250–450)
TROPONIN I SERPL HS-MCNC: 12 PG/ML (ref 0–14)
TSH W FREE THYROID IF ABNORMAL: 1.39 UIU/ML (ref 0.36–3.74)
TSH, 3RD GENERATION: 2.83 UIU/ML (ref 0.36–3.74)
URINE CULTURE IF INDICATED: ABNORMAL
UROBILINOGEN UR QL STRIP.AUTO: 0.2 EU/DL (ref 0.2–1)
VIT B12 SERPL-MCNC: 468 PG/ML (ref 193–986)
VIT B12 SERPL-MCNC: 512 PG/ML (ref 193–986)
WBC # BLD AUTO: 11.1 K/UL (ref 4.3–11.1)
WBC # BLD AUTO: 11.5 K/UL (ref 4.3–11.1)
WBC # BLD AUTO: 12.2 K/UL (ref 4.3–11.1)
WBC # BLD AUTO: 12.8 K/UL (ref 4.3–11.1)
WBC # BLD AUTO: 14.9 K/UL (ref 4.3–11.1)
WBC # BLD AUTO: 7.8 K/UL (ref 4.3–11.1)
WBC # BLD AUTO: 7.9 K/UL (ref 4.3–11.1)
WBC # BLD AUTO: 9.7 K/UL (ref 4.3–11.1)
WBC URNS QL MICRO: ABNORMAL /HPF

## 2022-01-01 PROCEDURE — 36415 COLL VENOUS BLD VENIPUNCTURE: CPT

## 2022-01-01 PROCEDURE — 84443 ASSAY THYROID STIM HORMONE: CPT

## 2022-01-01 PROCEDURE — A4216 STERILE WATER/SALINE, 10 ML: HCPCS | Performed by: INTERNAL MEDICINE

## 2022-01-01 PROCEDURE — 85025 COMPLETE CBC W/AUTO DIFF WBC: CPT

## 2022-01-01 PROCEDURE — 2580000003 HC RX 258: Performed by: FAMILY MEDICINE

## 2022-01-01 PROCEDURE — 81001 URINALYSIS AUTO W/SCOPE: CPT

## 2022-01-01 PROCEDURE — 6370000000 HC RX 637 (ALT 250 FOR IP): Performed by: FAMILY MEDICINE

## 2022-01-01 PROCEDURE — 80053 COMPREHEN METABOLIC PANEL: CPT

## 2022-01-01 PROCEDURE — 85014 HEMATOCRIT: CPT

## 2022-01-01 PROCEDURE — 85027 COMPLETE CBC AUTOMATED: CPT

## 2022-01-01 PROCEDURE — 6360000004 HC RX CONTRAST MEDICATION: Performed by: INTERNAL MEDICINE

## 2022-01-01 PROCEDURE — 83735 ASSAY OF MAGNESIUM: CPT

## 2022-01-01 PROCEDURE — 1100000000 HC RM PRIVATE

## 2022-01-01 PROCEDURE — 82607 VITAMIN B-12: CPT

## 2022-01-01 PROCEDURE — G8417 CALC BMI ABV UP PARAM F/U: HCPCS | Performed by: NURSE PRACTITIONER

## 2022-01-01 PROCEDURE — 84484 ASSAY OF TROPONIN QUANT: CPT

## 2022-01-01 PROCEDURE — 6360000002 HC RX W HCPCS: Performed by: INTERNAL MEDICINE

## 2022-01-01 PROCEDURE — 82746 ASSAY OF FOLIC ACID SERUM: CPT

## 2022-01-01 PROCEDURE — 93000 ELECTROCARDIOGRAM COMPLETE: CPT | Performed by: NURSE PRACTITIONER

## 2022-01-01 PROCEDURE — G8427 DOCREV CUR MEDS BY ELIG CLIN: HCPCS | Performed by: NURSE PRACTITIONER

## 2022-01-01 PROCEDURE — 2580000003 HC RX 258: Performed by: INTERNAL MEDICINE

## 2022-01-01 PROCEDURE — 96365 THER/PROPH/DIAG IV INF INIT: CPT

## 2022-01-01 PROCEDURE — 99285 EMERGENCY DEPT VISIT HI MDM: CPT

## 2022-01-01 PROCEDURE — 6370000000 HC RX 637 (ALT 250 FOR IP): Performed by: INTERNAL MEDICINE

## 2022-01-01 PROCEDURE — 80048 BASIC METABOLIC PNL TOTAL CA: CPT

## 2022-01-01 PROCEDURE — 87635 SARS-COV-2 COVID-19 AMP PRB: CPT

## 2022-01-01 PROCEDURE — 1036F TOBACCO NON-USER: CPT | Performed by: NURSE PRACTITIONER

## 2022-01-01 PROCEDURE — 99284 EMERGENCY DEPT VISIT MOD MDM: CPT

## 2022-01-01 PROCEDURE — A9579 GAD-BASE MR CONTRAST NOS,1ML: HCPCS | Performed by: INTERNAL MEDICINE

## 2022-01-01 PROCEDURE — 6360000002 HC RX W HCPCS: Performed by: EMERGENCY MEDICINE

## 2022-01-01 PROCEDURE — 2580000003 HC RX 258: Performed by: EMERGENCY MEDICINE

## 2022-01-01 PROCEDURE — 97161 PT EVAL LOW COMPLEX 20 MIN: CPT

## 2022-01-01 PROCEDURE — 1123F ACP DISCUSS/DSCN MKR DOCD: CPT | Performed by: NURSE PRACTITIONER

## 2022-01-01 PROCEDURE — 6360000002 HC RX W HCPCS: Performed by: FAMILY MEDICINE

## 2022-01-01 PROCEDURE — 83605 ASSAY OF LACTIC ACID: CPT

## 2022-01-01 PROCEDURE — A4216 STERILE WATER/SALINE, 10 ML: HCPCS | Performed by: EMERGENCY MEDICINE

## 2022-01-01 PROCEDURE — 2500000003 HC RX 250 WO HCPCS: Performed by: FAMILY MEDICINE

## 2022-01-01 PROCEDURE — 97530 THERAPEUTIC ACTIVITIES: CPT

## 2022-01-01 PROCEDURE — 97535 SELF CARE MNGMENT TRAINING: CPT

## 2022-01-01 PROCEDURE — 70553 MRI BRAIN STEM W/O & W/DYE: CPT

## 2022-01-01 PROCEDURE — 84145 PROCALCITONIN (PCT): CPT

## 2022-01-01 PROCEDURE — C9113 INJ PANTOPRAZOLE SODIUM, VIA: HCPCS | Performed by: EMERGENCY MEDICINE

## 2022-01-01 PROCEDURE — 86901 BLOOD TYPING SEROLOGIC RH(D): CPT

## 2022-01-01 PROCEDURE — 86140 C-REACTIVE PROTEIN: CPT

## 2022-01-01 PROCEDURE — C9113 INJ PANTOPRAZOLE SODIUM, VIA: HCPCS | Performed by: INTERNAL MEDICINE

## 2022-01-01 PROCEDURE — 96361 HYDRATE IV INFUSION ADD-ON: CPT

## 2022-01-01 PROCEDURE — 71045 X-RAY EXAM CHEST 1 VIEW: CPT

## 2022-01-01 PROCEDURE — 85652 RBC SED RATE AUTOMATED: CPT

## 2022-01-01 PROCEDURE — 99214 OFFICE O/P EST MOD 30 MIN: CPT | Performed by: NURSE PRACTITIONER

## 2022-01-01 PROCEDURE — 74176 CT ABD & PELVIS W/O CONTRAST: CPT

## 2022-01-01 PROCEDURE — 99226 PR SBSQ OBSERVATION CARE/DAY 35 MINUTES: CPT | Performed by: PSYCHIATRY & NEUROLOGY

## 2022-01-01 PROCEDURE — 96375 TX/PRO/DX INJ NEW DRUG ADDON: CPT

## 2022-01-01 PROCEDURE — 87040 BLOOD CULTURE FOR BACTERIA: CPT

## 2022-01-01 PROCEDURE — 70450 CT HEAD/BRAIN W/O DYE: CPT

## 2022-01-01 PROCEDURE — 97165 OT EVAL LOW COMPLEX 30 MIN: CPT

## 2022-01-01 PROCEDURE — 83540 ASSAY OF IRON: CPT

## 2022-01-01 PROCEDURE — 99495 TRANSJ CARE MGMT MOD F2F 14D: CPT | Performed by: INTERNAL MEDICINE

## 2022-01-01 PROCEDURE — 83690 ASSAY OF LIPASE: CPT

## 2022-01-01 PROCEDURE — 82728 ASSAY OF FERRITIN: CPT

## 2022-01-01 PROCEDURE — 85610 PROTHROMBIN TIME: CPT

## 2022-01-01 RX ORDER — TRAZODONE HYDROCHLORIDE 50 MG/1
50 TABLET ORAL NIGHTLY
Status: DISCONTINUED | OUTPATIENT
Start: 2022-01-01 | End: 2022-01-01 | Stop reason: HOSPADM

## 2022-01-01 RX ORDER — FINASTERIDE 5 MG/1
5 TABLET, FILM COATED ORAL DAILY
Status: DISCONTINUED | OUTPATIENT
Start: 2022-01-01 | End: 2022-01-01 | Stop reason: HOSPADM

## 2022-01-01 RX ORDER — DRONABINOL 2.5 MG/1
2.5 CAPSULE ORAL 2 TIMES DAILY
Qty: 40 CAPSULE | Refills: 0 | Status: SHIPPED | OUTPATIENT
Start: 2022-01-01 | End: 2022-01-01

## 2022-01-01 RX ORDER — TAMSULOSIN HYDROCHLORIDE 0.4 MG/1
0.4 CAPSULE ORAL DAILY
Qty: 30 CAPSULE | Refills: 0 | Status: SHIPPED | OUTPATIENT
Start: 2022-01-01

## 2022-01-01 RX ORDER — AMOXICILLIN AND CLAVULANATE POTASSIUM 875; 125 MG/1; MG/1
1 TABLET, FILM COATED ORAL 2 TIMES DAILY
Qty: 6 TABLET | Refills: 0 | Status: SHIPPED | OUTPATIENT
Start: 2022-01-01 | End: 2022-01-01

## 2022-01-01 RX ORDER — ATORVASTATIN CALCIUM 40 MG/1
TABLET, FILM COATED ORAL
Qty: 90 TABLET | Refills: 0 | Status: SHIPPED | OUTPATIENT
Start: 2022-01-01

## 2022-01-01 RX ORDER — POLYETHYLENE GLYCOL 3350 17 G/17G
17 POWDER, FOR SOLUTION ORAL DAILY PRN
Status: DISCONTINUED | OUTPATIENT
Start: 2022-01-01 | End: 2022-01-01 | Stop reason: HOSPADM

## 2022-01-01 RX ORDER — SODIUM CHLORIDE 9 MG/ML
INJECTION, SOLUTION INTRAVENOUS PRN
Status: DISCONTINUED | OUTPATIENT
Start: 2022-01-01 | End: 2022-01-01 | Stop reason: HOSPADM

## 2022-01-01 RX ORDER — SODIUM BICARBONATE 650 MG/1
650 TABLET ORAL 3 TIMES DAILY
Status: DISCONTINUED | OUTPATIENT
Start: 2022-01-01 | End: 2022-01-01

## 2022-01-01 RX ORDER — ONDANSETRON 2 MG/ML
4 INJECTION INTRAMUSCULAR; INTRAVENOUS
Status: COMPLETED | OUTPATIENT
Start: 2022-01-01 | End: 2022-01-01

## 2022-01-01 RX ORDER — 0.9 % SODIUM CHLORIDE 0.9 %
1000 INTRAVENOUS SOLUTION INTRAVENOUS ONCE
Status: COMPLETED | OUTPATIENT
Start: 2022-01-01 | End: 2022-01-01

## 2022-01-01 RX ORDER — TRAZODONE HYDROCHLORIDE 50 MG/1
TABLET ORAL
Qty: 90 TABLET | Refills: 0 | Status: SHIPPED | OUTPATIENT
Start: 2022-01-01

## 2022-01-01 RX ORDER — SODIUM CHLORIDE 0.9 % (FLUSH) 0.9 %
5-40 SYRINGE (ML) INJECTION PRN
Status: DISCONTINUED | OUTPATIENT
Start: 2022-01-01 | End: 2022-01-01 | Stop reason: HOSPADM

## 2022-01-01 RX ORDER — FINASTERIDE 5 MG/1
5 TABLET, FILM COATED ORAL DAILY
Qty: 30 TABLET | Refills: 0 | Status: SHIPPED | OUTPATIENT
Start: 2022-01-01

## 2022-01-01 RX ORDER — BENAZEPRIL HYDROCHLORIDE 20 MG/1
TABLET ORAL
COMMUNITY
Start: 2022-01-01 | End: 2022-01-01

## 2022-01-01 RX ORDER — PANTOPRAZOLE SODIUM 40 MG/1
40 TABLET, DELAYED RELEASE ORAL
Status: DISCONTINUED | OUTPATIENT
Start: 2022-01-01 | End: 2022-01-01 | Stop reason: HOSPADM

## 2022-01-01 RX ORDER — ATORVASTATIN CALCIUM 40 MG/1
40 TABLET, FILM COATED ORAL NIGHTLY
Status: DISCONTINUED | OUTPATIENT
Start: 2022-01-01 | End: 2022-01-01 | Stop reason: HOSPADM

## 2022-01-01 RX ORDER — FLUTICASONE PROPIONATE 50 MCG
SPRAY, SUSPENSION (ML) NASAL
Qty: 48 G | Refills: 0 | Status: ON HOLD | OUTPATIENT
Start: 2022-01-01 | End: 2022-01-01 | Stop reason: HOSPADM

## 2022-01-01 RX ORDER — PANTOPRAZOLE SODIUM 40 MG/1
40 TABLET, DELAYED RELEASE ORAL
Qty: 30 TABLET | Refills: 3 | Status: SHIPPED | OUTPATIENT
Start: 2022-01-01

## 2022-01-01 RX ORDER — ACETAMINOPHEN 650 MG/1
650 SUPPOSITORY RECTAL EVERY 6 HOURS PRN
Status: DISCONTINUED | OUTPATIENT
Start: 2022-01-01 | End: 2022-01-01 | Stop reason: HOSPADM

## 2022-01-01 RX ORDER — ONDANSETRON 4 MG/1
4 TABLET, FILM COATED ORAL EVERY 8 HOURS PRN
Qty: 12 TABLET | Refills: 0 | Status: SHIPPED | OUTPATIENT
Start: 2022-01-01

## 2022-01-01 RX ORDER — PREDNISONE 20 MG/1
60 TABLET ORAL DAILY
Qty: 60 TABLET | Refills: 0 | Status: SHIPPED | OUTPATIENT
Start: 2022-01-01 | End: 2022-01-01

## 2022-01-01 RX ORDER — TAMSULOSIN HYDROCHLORIDE 0.4 MG/1
0.4 CAPSULE ORAL DAILY
Status: DISCONTINUED | OUTPATIENT
Start: 2022-01-01 | End: 2022-01-01 | Stop reason: HOSPADM

## 2022-01-01 RX ORDER — SODIUM CHLORIDE 9 MG/ML
INJECTION, SOLUTION INTRAVENOUS CONTINUOUS
Status: ACTIVE | OUTPATIENT
Start: 2022-01-01 | End: 2022-01-01

## 2022-01-01 RX ORDER — SODIUM CHLORIDE 0.9 % (FLUSH) 0.9 %
10 SYRINGE (ML) INJECTION AS NEEDED
Status: DISCONTINUED | OUTPATIENT
Start: 2022-01-01 | End: 2022-01-01 | Stop reason: HOSPADM

## 2022-01-01 RX ORDER — ACETAMINOPHEN 500 MG
1000 TABLET ORAL EVERY 6 HOURS PRN
Qty: 120 TABLET | Refills: 0
Start: 2022-01-01

## 2022-01-01 RX ORDER — SODIUM CHLORIDE 0.9 % (FLUSH) 0.9 %
5-40 SYRINGE (ML) INJECTION EVERY 12 HOURS SCHEDULED
Status: DISCONTINUED | OUTPATIENT
Start: 2022-01-01 | End: 2022-01-01 | Stop reason: HOSPADM

## 2022-01-01 RX ORDER — ONDANSETRON 4 MG/1
4 TABLET, ORALLY DISINTEGRATING ORAL EVERY 8 HOURS PRN
Status: DISCONTINUED | OUTPATIENT
Start: 2022-01-01 | End: 2022-01-01 | Stop reason: HOSPADM

## 2022-01-01 RX ORDER — DRONABINOL 2.5 MG/1
2.5 CAPSULE ORAL 2 TIMES DAILY
Status: DISCONTINUED | OUTPATIENT
Start: 2022-01-01 | End: 2022-01-01 | Stop reason: HOSPADM

## 2022-01-01 RX ORDER — ONDANSETRON 2 MG/ML
4 INJECTION INTRAMUSCULAR; INTRAVENOUS EVERY 6 HOURS PRN
Status: DISCONTINUED | OUTPATIENT
Start: 2022-01-01 | End: 2022-01-01 | Stop reason: HOSPADM

## 2022-01-01 RX ORDER — ACETAMINOPHEN 325 MG/1
650 TABLET ORAL EVERY 6 HOURS PRN
Status: DISCONTINUED | OUTPATIENT
Start: 2022-01-01 | End: 2022-01-01 | Stop reason: HOSPADM

## 2022-01-01 RX ADMIN — SODIUM CHLORIDE, PRESERVATIVE FREE 10 ML: 5 INJECTION INTRAVENOUS at 20:30

## 2022-01-01 RX ADMIN — SODIUM CHLORIDE, PRESERVATIVE FREE 10 ML: 5 INJECTION INTRAVENOUS at 20:19

## 2022-01-01 RX ADMIN — PANTOPRAZOLE SODIUM 40 MG: 40 INJECTION, POWDER, LYOPHILIZED, FOR SOLUTION INTRAVENOUS at 10:02

## 2022-01-01 RX ADMIN — SODIUM CHLORIDE, PRESERVATIVE FREE 10 ML: 5 INJECTION INTRAVENOUS at 09:11

## 2022-01-01 RX ADMIN — PANTOPRAZOLE SODIUM 40 MG: 40 TABLET, DELAYED RELEASE ORAL at 05:49

## 2022-01-01 RX ADMIN — TRAZODONE HYDROCHLORIDE 50 MG: 50 TABLET ORAL at 22:37

## 2022-01-01 RX ADMIN — SODIUM CHLORIDE, PRESERVATIVE FREE 10 ML: 5 INJECTION INTRAVENOUS at 22:37

## 2022-01-01 RX ADMIN — TAMSULOSIN HYDROCHLORIDE 0.4 MG: 0.4 CAPSULE ORAL at 08:10

## 2022-01-01 RX ADMIN — SODIUM BICARBONATE 650 MG TABLET 650 MG: at 20:30

## 2022-01-01 RX ADMIN — TAMSULOSIN HYDROCHLORIDE 0.4 MG: 0.4 CAPSULE ORAL at 08:28

## 2022-01-01 RX ADMIN — SODIUM BICARBONATE 650 MG TABLET 650 MG: at 16:46

## 2022-01-01 RX ADMIN — DEXTROSE MONOHYDRATE 500 MG: 5 INJECTION, SOLUTION INTRAVENOUS at 22:43

## 2022-01-01 RX ADMIN — TRAZODONE HYDROCHLORIDE 50 MG: 50 TABLET ORAL at 23:22

## 2022-01-01 RX ADMIN — SODIUM CHLORIDE 40 MG: 9 INJECTION, SOLUTION INTRAMUSCULAR; INTRAVENOUS; SUBCUTANEOUS at 11:57

## 2022-01-01 RX ADMIN — SODIUM CHLORIDE 1983 ML: 900 INJECTION, SOLUTION INTRAVENOUS at 11:26

## 2022-01-01 RX ADMIN — PANTOPRAZOLE SODIUM 40 MG: 40 INJECTION, POWDER, LYOPHILIZED, FOR SOLUTION INTRAVENOUS at 08:10

## 2022-01-01 RX ADMIN — SODIUM CHLORIDE 1000 ML: 9 INJECTION, SOLUTION INTRAVENOUS at 14:41

## 2022-01-01 RX ADMIN — SODIUM CHLORIDE, PRESERVATIVE FREE 10 ML: 5 INJECTION INTRAVENOUS at 08:36

## 2022-01-01 RX ADMIN — SODIUM CHLORIDE, PRESERVATIVE FREE 10 ML: 5 INJECTION INTRAVENOUS at 10:18

## 2022-01-01 RX ADMIN — GADOTERIDOL 18 ML: 279.3 INJECTION, SOLUTION INTRAVENOUS at 18:05

## 2022-01-01 RX ADMIN — ATORVASTATIN CALCIUM 40 MG: 40 TABLET, FILM COATED ORAL at 20:30

## 2022-01-01 RX ADMIN — ACETAMINOPHEN 650 MG: 325 TABLET ORAL at 08:07

## 2022-01-01 RX ADMIN — PIPERACILLIN AND TAZOBACTAM 3375 MG: 3; .375 INJECTION, POWDER, LYOPHILIZED, FOR SOLUTION INTRAVENOUS at 10:02

## 2022-01-01 RX ADMIN — TAMSULOSIN HYDROCHLORIDE 0.4 MG: 0.4 CAPSULE ORAL at 08:34

## 2022-01-01 RX ADMIN — ATORVASTATIN CALCIUM 40 MG: 40 TABLET, FILM COATED ORAL at 20:17

## 2022-01-01 RX ADMIN — DEXTROSE MONOHYDRATE 500 MG: 5 INJECTION, SOLUTION INTRAVENOUS at 23:03

## 2022-01-01 RX ADMIN — TAMSULOSIN HYDROCHLORIDE 0.4 MG: 0.4 CAPSULE ORAL at 09:07

## 2022-01-01 RX ADMIN — DEXTROSE MONOHYDRATE 500 MG: 5 INJECTION, SOLUTION INTRAVENOUS at 11:37

## 2022-01-01 RX ADMIN — PIPERACILLIN AND TAZOBACTAM 3375 MG: 3; .375 INJECTION, POWDER, LYOPHILIZED, FOR SOLUTION INTRAVENOUS at 01:53

## 2022-01-01 RX ADMIN — PIPERACILLIN AND TAZOBACTAM 3375 MG: 3; .375 INJECTION, POWDER, LYOPHILIZED, FOR SOLUTION INTRAVENOUS at 02:04

## 2022-01-01 RX ADMIN — SODIUM BICARBONATE 650 MG TABLET 650 MG: at 14:01

## 2022-01-01 RX ADMIN — SODIUM CHLORIDE: 9 INJECTION, SOLUTION INTRAVENOUS at 14:57

## 2022-01-01 RX ADMIN — TRAZODONE HYDROCHLORIDE 50 MG: 50 TABLET ORAL at 20:18

## 2022-01-01 RX ADMIN — APIXABAN 2.5 MG: 5 TABLET, FILM COATED ORAL at 21:01

## 2022-01-01 RX ADMIN — FINASTERIDE 5 MG: 5 TABLET, FILM COATED ORAL at 08:28

## 2022-01-01 RX ADMIN — SODIUM BICARBONATE 650 MG TABLET 650 MG: at 13:05

## 2022-01-01 RX ADMIN — ACETAMINOPHEN 650 MG: 325 TABLET ORAL at 16:27

## 2022-01-01 RX ADMIN — SODIUM BICARBONATE 650 MG TABLET 650 MG: at 13:08

## 2022-01-01 RX ADMIN — SODIUM CHLORIDE, PRESERVATIVE FREE 10 ML: 5 INJECTION INTRAVENOUS at 23:23

## 2022-01-01 RX ADMIN — PIPERACILLIN AND TAZOBACTAM 3375 MG: 3; .375 INJECTION, POWDER, LYOPHILIZED, FOR SOLUTION INTRAVENOUS at 02:01

## 2022-01-01 RX ADMIN — SODIUM CHLORIDE, PRESERVATIVE FREE 10 ML: 5 INJECTION INTRAVENOUS at 08:12

## 2022-01-01 RX ADMIN — TUBERCULIN PURIFIED PROTEIN DERIVATIVE 5 UNITS: 5 INJECTION, SOLUTION INTRADERMAL at 16:46

## 2022-01-01 RX ADMIN — SODIUM BICARBONATE 650 MG TABLET 650 MG: at 09:07

## 2022-01-01 RX ADMIN — PIPERACILLIN AND TAZOBACTAM 3375 MG: 3; .375 INJECTION, POWDER, LYOPHILIZED, FOR SOLUTION INTRAVENOUS at 17:01

## 2022-01-01 RX ADMIN — DEXTROSE MONOHYDRATE 500 MG: 5 INJECTION, SOLUTION INTRAVENOUS at 11:05

## 2022-01-01 RX ADMIN — PIPERACILLIN AND TAZOBACTAM 3375 MG: 3; .375 INJECTION, POWDER, LYOPHILIZED, FOR SOLUTION INTRAVENOUS at 09:47

## 2022-01-01 RX ADMIN — PIPERACILLIN AND TAZOBACTAM 3375 MG: 3; .375 INJECTION, POWDER, LYOPHILIZED, FOR SOLUTION INTRAVENOUS at 17:27

## 2022-01-01 RX ADMIN — SODIUM CHLORIDE, PRESERVATIVE FREE 10 ML: 5 INJECTION INTRAVENOUS at 18:05

## 2022-01-01 RX ADMIN — TRAZODONE HYDROCHLORIDE 50 MG: 50 TABLET ORAL at 21:01

## 2022-01-01 RX ADMIN — FINASTERIDE 5 MG: 5 TABLET, FILM COATED ORAL at 10:02

## 2022-01-01 RX ADMIN — SODIUM BICARBONATE 650 MG TABLET 650 MG: at 08:34

## 2022-01-01 RX ADMIN — SODIUM BICARBONATE 650 MG TABLET 650 MG: at 22:37

## 2022-01-01 RX ADMIN — SODIUM CHLORIDE: 9 INJECTION, SOLUTION INTRAVENOUS at 13:45

## 2022-01-01 RX ADMIN — PREDNISONE 60 MG: 10 TABLET ORAL at 08:28

## 2022-01-01 RX ADMIN — TAMSULOSIN HYDROCHLORIDE 0.4 MG: 0.4 CAPSULE ORAL at 08:08

## 2022-01-01 RX ADMIN — DEXTROSE MONOHYDRATE 500 MG: 5 INJECTION, SOLUTION INTRAVENOUS at 23:14

## 2022-01-01 RX ADMIN — DRONABINOL 2.5 MG: 2.5 CAPSULE ORAL at 10:40

## 2022-01-01 RX ADMIN — ATORVASTATIN CALCIUM 40 MG: 40 TABLET, FILM COATED ORAL at 23:22

## 2022-01-01 RX ADMIN — PIPERACILLIN AND TAZOBACTAM 3375 MG: 3; .375 INJECTION, POWDER, LYOPHILIZED, FOR SOLUTION INTRAVENOUS at 10:33

## 2022-01-01 RX ADMIN — SODIUM CHLORIDE, PRESERVATIVE FREE 10 ML: 5 INJECTION INTRAVENOUS at 08:11

## 2022-01-01 RX ADMIN — TRAZODONE HYDROCHLORIDE 50 MG: 50 TABLET ORAL at 20:30

## 2022-01-01 RX ADMIN — ATORVASTATIN CALCIUM 40 MG: 40 TABLET, FILM COATED ORAL at 21:01

## 2022-01-01 RX ADMIN — PIPERACILLIN AND TAZOBACTAM 3375 MG: 3; .375 INJECTION, POWDER, LYOPHILIZED, FOR SOLUTION INTRAVENOUS at 10:41

## 2022-01-01 RX ADMIN — PIPERACILLIN AND TAZOBACTAM 3375 MG: 3; .375 INJECTION, POWDER, LYOPHILIZED, FOR SOLUTION INTRAVENOUS at 01:42

## 2022-01-01 RX ADMIN — SODIUM CHLORIDE 1000 ML: 900 INJECTION, SOLUTION INTRAVENOUS at 13:57

## 2022-01-01 RX ADMIN — PIPERACILLIN AND TAZOBACTAM 3375 MG: 3; .375 INJECTION, POWDER, LYOPHILIZED, FOR SOLUTION INTRAVENOUS at 17:10

## 2022-01-01 RX ADMIN — ONDANSETRON 4 MG: 2 INJECTION INTRAMUSCULAR; INTRAVENOUS at 11:57

## 2022-01-01 RX ADMIN — PIPERACILLIN AND TAZOBACTAM 3375 MG: 3; .375 INJECTION, POWDER, LYOPHILIZED, FOR SOLUTION INTRAVENOUS at 17:19

## 2022-01-01 RX ADMIN — PIPERACILLIN AND TAZOBACTAM 3375 MG: 3; .375 INJECTION, POWDER, LYOPHILIZED, FOR SOLUTION INTRAVENOUS at 01:36

## 2022-01-01 RX ADMIN — DEXTROSE MONOHYDRATE 500 MG: 5 INJECTION, SOLUTION INTRAVENOUS at 11:09

## 2022-01-01 RX ADMIN — PANTOPRAZOLE SODIUM 40 MG: 40 INJECTION, POWDER, LYOPHILIZED, FOR SOLUTION INTRAVENOUS at 08:09

## 2022-01-01 RX ADMIN — PIPERACILLIN AND TAZOBACTAM 3375 MG: 3; .375 INJECTION, POWDER, LYOPHILIZED, FOR SOLUTION INTRAVENOUS at 10:08

## 2022-01-01 RX ADMIN — PIPERACILLIN AND TAZOBACTAM 3375 MG: 3; .375 INJECTION, POWDER, LYOPHILIZED, FOR SOLUTION INTRAVENOUS at 17:44

## 2022-01-01 RX ADMIN — SODIUM BICARBONATE 650 MG TABLET 650 MG: at 20:17

## 2022-01-01 RX ADMIN — PIPERACILLIN AND TAZOBACTAM 4500 MG: 4; .5 INJECTION, POWDER, LYOPHILIZED, FOR SOLUTION INTRAVENOUS at 11:57

## 2022-01-01 RX ADMIN — SODIUM BICARBONATE 650 MG TABLET 650 MG: at 23:21

## 2022-01-01 RX ADMIN — SODIUM BICARBONATE 650 MG TABLET 650 MG: at 08:08

## 2022-01-01 RX ADMIN — SODIUM CHLORIDE: 9 INJECTION, SOLUTION INTRAVENOUS at 08:12

## 2022-01-01 RX ADMIN — SODIUM CHLORIDE, PRESERVATIVE FREE 10 ML: 5 INJECTION INTRAVENOUS at 21:02

## 2022-01-01 RX ADMIN — APIXABAN 2.5 MG: 5 TABLET, FILM COATED ORAL at 08:28

## 2022-01-01 RX ADMIN — SODIUM BICARBONATE 650 MG TABLET 650 MG: at 08:10

## 2022-01-01 RX ADMIN — ATORVASTATIN CALCIUM 40 MG: 40 TABLET, FILM COATED ORAL at 22:37

## 2022-01-01 RX ADMIN — TAMSULOSIN HYDROCHLORIDE 0.4 MG: 0.4 CAPSULE ORAL at 16:46

## 2022-01-01 SDOH — ECONOMIC STABILITY: FOOD INSECURITY: WITHIN THE PAST 12 MONTHS, THE FOOD YOU BOUGHT JUST DIDN'T LAST AND YOU DIDN'T HAVE MONEY TO GET MORE.: NEVER TRUE

## 2022-01-01 SDOH — ECONOMIC STABILITY: FOOD INSECURITY: WITHIN THE PAST 12 MONTHS, YOU WORRIED THAT YOUR FOOD WOULD RUN OUT BEFORE YOU GOT MONEY TO BUY MORE.: NEVER TRUE

## 2022-01-01 ASSESSMENT — PATIENT HEALTH QUESTIONNAIRE - PHQ9
SUM OF ALL RESPONSES TO PHQ QUESTIONS 1-9: 1
SUM OF ALL RESPONSES TO PHQ QUESTIONS 1-9: 0
SUM OF ALL RESPONSES TO PHQ9 QUESTIONS 1 & 2: 1
SUM OF ALL RESPONSES TO PHQ QUESTIONS 1-9: 0
1. LITTLE INTEREST OR PLEASURE IN DOING THINGS: 0
SUM OF ALL RESPONSES TO PHQ QUESTIONS 1-9: 1
1. LITTLE INTEREST OR PLEASURE IN DOING THINGS: 0
SUM OF ALL RESPONSES TO PHQ QUESTIONS 1-9: 0
SUM OF ALL RESPONSES TO PHQ QUESTIONS 1-9: 1
2. FEELING DOWN, DEPRESSED OR HOPELESS: 0
2. FEELING DOWN, DEPRESSED OR HOPELESS: 1
SUM OF ALL RESPONSES TO PHQ9 QUESTIONS 1 & 2: 0
SUM OF ALL RESPONSES TO PHQ QUESTIONS 1-9: 1
SUM OF ALL RESPONSES TO PHQ QUESTIONS 1-9: 0

## 2022-01-01 ASSESSMENT — ENCOUNTER SYMPTOMS
VOICE CHANGE: 0
SORE THROAT: 0
VOMITING: 0
ABDOMINAL PAIN: 0
NAUSEA: 0
VOICE CHANGE: 0
SHORTNESS OF BREATH: 1
SHORTNESS OF BREATH: 0
NAUSEA: 1
BACK PAIN: 0
ABDOMINAL DISTENTION: 0
VOMITING: 0
CONSTIPATION: 0
RECTAL PAIN: 0
DIARRHEA: 1
DIARRHEA: 1
BLOOD IN STOOL: 1
TROUBLE SWALLOWING: 1
COUGH: 0
SHORTNESS OF BREATH: 0
WHEEZING: 0
CONSTIPATION: 0
ABDOMINAL DISTENTION: 0
ABDOMINAL PAIN: 0
ABDOMINAL PAIN: 1
PHOTOPHOBIA: 0
COUGH: 0
SORE THROAT: 0
TROUBLE SWALLOWING: 0
SHORTNESS OF BREATH: 0
RHINORRHEA: 0
EYE REDNESS: 0
SORE THROAT: 0
ABDOMINAL PAIN: 0
DIARRHEA: 0
RHINORRHEA: 0
NAUSEA: 0
EYE PAIN: 0
CHEST TIGHTNESS: 0
ABDOMINAL DISTENTION: 0
COUGH: 0
BLOOD IN STOOL: 0
BLOOD IN STOOL: 0
VOMITING: 0

## 2022-01-01 ASSESSMENT — PAIN DESCRIPTION - LOCATION
LOCATION: HEAD

## 2022-01-01 ASSESSMENT — PAIN SCALES - GENERAL
PAINLEVEL_OUTOF10: 0
PAINLEVEL_OUTOF10: 3
PAINLEVEL_OUTOF10: 0
PAINLEVEL_OUTOF10: 5
PAINLEVEL_OUTOF10: 3
PAINLEVEL_OUTOF10: 0
PAINLEVEL_OUTOF10: 0
PAINLEVEL_OUTOF10: 1
PAINLEVEL_OUTOF10: 0

## 2022-01-01 ASSESSMENT — ANXIETY QUESTIONNAIRES
1. FEELING NERVOUS, ANXIOUS, OR ON EDGE: 1
7. FEELING AFRAID AS IF SOMETHING AWFUL MIGHT HAPPEN: 0
IF YOU CHECKED OFF ANY PROBLEMS ON THIS QUESTIONNAIRE, HOW DIFFICULT HAVE THESE PROBLEMS MADE IT FOR YOU TO DO YOUR WORK, TAKE CARE OF THINGS AT HOME, OR GET ALONG WITH OTHER PEOPLE: VERY DIFFICULT
6. BECOMING EASILY ANNOYED OR IRRITABLE: 2
2. NOT BEING ABLE TO STOP OR CONTROL WORRYING: 0
5. BEING SO RESTLESS THAT IT IS HARD TO SIT STILL: 3
4. TROUBLE RELAXING: 0
3. WORRYING TOO MUCH ABOUT DIFFERENT THINGS: 0

## 2022-01-01 ASSESSMENT — PAIN DESCRIPTION - ORIENTATION
ORIENTATION: ANTERIOR
ORIENTATION: MID

## 2022-01-01 ASSESSMENT — PAIN DESCRIPTION - ONSET: ONSET: GRADUAL

## 2022-01-01 ASSESSMENT — PAIN DESCRIPTION - FREQUENCY: FREQUENCY: INTERMITTENT

## 2022-01-01 ASSESSMENT — PAIN DESCRIPTION - DESCRIPTORS
DESCRIPTORS: ACHING
DESCRIPTORS: ACHING

## 2022-01-01 ASSESSMENT — PAIN - FUNCTIONAL ASSESSMENT
PAIN_FUNCTIONAL_ASSESSMENT: ACTIVITIES ARE NOT PREVENTED
PAIN_FUNCTIONAL_ASSESSMENT: 0-10

## 2022-01-01 ASSESSMENT — SOCIAL DETERMINANTS OF HEALTH (SDOH): HOW HARD IS IT FOR YOU TO PAY FOR THE VERY BASICS LIKE FOOD, HOUSING, MEDICAL CARE, AND HEATING?: NOT HARD AT ALL

## 2022-01-01 ASSESSMENT — PAIN DESCRIPTION - PAIN TYPE: TYPE: ACUTE PAIN

## 2022-01-31 PROBLEM — R51.9 CHRONIC RIGHT-SIDED HEADACHES: Status: ACTIVE | Noted: 2022-01-31

## 2022-01-31 PROBLEM — G89.29 CHRONIC RIGHT-SIDED HEADACHES: Status: ACTIVE | Noted: 2022-01-31

## 2022-02-21 ENCOUNTER — HOSPITAL ENCOUNTER (OUTPATIENT)
Dept: CT IMAGING | Age: 87
Discharge: HOME | End: 2022-02-21
Attending: INTERNAL MEDICINE

## 2022-02-21 DIAGNOSIS — R51.9 CHRONIC RIGHT-SIDED HEADACHES: ICD-10-CM

## 2022-02-21 DIAGNOSIS — R53.1 LEFT-SIDED WEAKNESS: ICD-10-CM

## 2022-02-21 DIAGNOSIS — G89.29 CHRONIC RIGHT-SIDED HEADACHES: ICD-10-CM

## 2022-03-18 PROBLEM — R42 DIZZINESS: Status: ACTIVE | Noted: 2020-01-31

## 2022-03-18 PROBLEM — R06.09 DOE (DYSPNEA ON EXERTION): Status: ACTIVE | Noted: 2020-11-03

## 2022-03-18 PROBLEM — I63.9 ACUTE CVA (CEREBROVASCULAR ACCIDENT) (HCC): Status: ACTIVE | Noted: 2020-02-01

## 2022-03-19 PROBLEM — R53.1 LEFT-SIDED WEAKNESS: Status: ACTIVE | Noted: 2020-02-17

## 2022-03-19 PROBLEM — R20.2 LEFT LEG PARESTHESIAS: Status: ACTIVE | Noted: 2020-01-31

## 2022-03-19 PROBLEM — R51.9 CHRONIC RIGHT-SIDED HEADACHES: Status: ACTIVE | Noted: 2022-01-01

## 2022-03-19 PROBLEM — I35.0 AORTIC STENOSIS, MODERATE: Status: ACTIVE | Noted: 2017-04-21

## 2022-03-19 PROBLEM — R47.81 SLURRED SPEECH: Status: ACTIVE | Noted: 2020-02-17

## 2022-03-19 PROBLEM — E87.5 HYPERKALEMIA: Status: ACTIVE | Noted: 2020-01-31

## 2022-03-19 PROBLEM — R60.0 LOCALIZED EDEMA: Status: ACTIVE | Noted: 2020-12-08

## 2022-03-19 PROBLEM — G89.29 CHRONIC RIGHT-SIDED HEADACHES: Status: ACTIVE | Noted: 2022-01-01

## 2022-03-19 PROBLEM — N17.9 AKI (ACUTE KIDNEY INJURY) (HCC): Status: ACTIVE | Noted: 2020-01-31

## 2022-03-19 PROBLEM — G45.9 TIA (TRANSIENT ISCHEMIC ATTACK): Status: ACTIVE | Noted: 2020-02-18

## 2022-04-26 ENCOUNTER — NURSE TRIAGE (OUTPATIENT)
Dept: OTHER | Facility: CLINIC | Age: 87
End: 2022-04-26

## 2022-04-26 NOTE — TELEPHONE ENCOUNTER
Received call from [address] at [facility] with Red Flag Complaint. Subjective: Caller daughter states Laurenao Garcia just said he doesn't feel good. No energy. Not eating properly. Food does not taste good. [place] and his BP was really low, 105/59 \"     Current Symptoms: patient BP today was 105/59, pt reports feeling fatigued and tired , able to ambulate a few steps. Pt denies chest pains , shortness of breath at rest, new onset numbness or weakness one side of body. Pt reports shortness of breath with exertion  worse than baseline. covid home test negative. diarrhea , 3 episodes in last 24 hours. Pt  Reports drinking fluids probably hasn't been drinking enough , drinking ensure. Also lightheaded     Onset: Wednesday of last week     Associated Symptoms: reduced activity    Pain Severity: chronic shoulder pain     Temperature: hasn't checked     What has been tried: ensure     LMP: NA Pregnant: NA    Recommended disposition: Go to ED/UCC Now (Or to Office with PCP Approval)    Care advice provided, patient verbalizes understanding; denies any other questions or concerns; instructed to call back for any new or worsening symptoms. Writer provided warm transfer to [facility] for further assessment and resume of care    Attention Provider: Thank you for allowing me to participate in the care of your patient. The patient was connected to triage in response to information provided to the [facility]. Please do not respond through this encounter as the response is not directed to a shared pool.     Reason for Disposition   Drinking very little and has signs of dehydration (e.g., no urine > 12 hours, very dry mouth, very lightheaded)    Protocols used: DIARRHEA-ADULT-OH

## 2022-06-07 NOTE — TELEPHONE ENCOUNTER
Pt is wanting to get in to see Jen Mayer soon he has been dizzy, not eating regularly (food doesn't taste good), losing weight (40 pounds) he asked that he be given a call back.

## 2022-06-08 NOTE — PROGRESS NOTES
[facility]  Office Visit Note    Subjective:  Chief Complaint   Patient presents with    Dizziness     Dizziness/weakness/ no appetite/ weight loss in the past 2mo        Patient ID: Brielle Carpenter is a 80 y.o. male presenting to the office for the above. 80year old male with concerns of dizziness, poor appetite, weight loss, weakness. Unaccompanied in office. PCP is Dr. Reyna Cortes. History includes prostate cancer (completed treatment over 20 years ago; he believes he had radiation therapy, does not believe prostate was removed), hypertension, history of CVA, CKD-3b. He is unclear on what medications he is taking; medication reconciliation completed as best as I could. States he has had poor appetite, recurrent diarrhea, weakness since April 2022. Negative home Covid test in April. States that anything he puts in his mouth tastes awful (milk still tastes pretty normal). Reports gradual weight loss over past 8 months; on office scales, he was was 238lb November 2021, 227lb March 2022, and 199lb today. States he is trying to stay hydrated (Gatorade). Feels his urination is less than it used to be, and it is darker than normal (for past several months). Endorses some mild headaches, intermittent diarrhea (watery, at least once a day), indigestion (quickly relieved with Tums). Denies chest pains, palpitations, cough, new body aches, nausea/vomiting, constipation, abdominal or pelvic pain, urinary symptoms (dysuria, hematuria), blood in stool, sweats. He had a CT head in February 2022 for chronic headaches; results were normal for age. --Chest labs today   --Chest xray   --Advise to stay hydrated (water, Gatorade). Try adding nutritional shakes daily. --Follow up with PCP in 2 weeks; call sooner with any concerns. Advised of symptoms that would require immediate medical attention in the ER; patient expresses understanding. History:   Allergies   Allergen Reactions  Gabapentin Other (See Comments)     Dizzy.     Pt reports that he's now taking the medicine at night before bed - no longer having issues and is continuing to take this med.  2016       Past Medical History:   Diagnosis Date    [facility]     Cancer of prostate ([facility]     radiation tx     CKD (chronic kidney disease) stage 3, GFR 30-59 ml/min ([facility] 2012    Gout     Metabolic syndrome     Mixed hyperlipidemia 2012    Moderate aortic valve stenosis     Peptic ulcer disease 2012    Unspecified essential hypertension 2012    Urinary retention        Past Surgical History:   Procedure Laterality Date    APPENDECTOMY  1950    CHOLECYSTECTOMY      COLONOSCOPY      GASTRECTOMY      V&A,     TURP  ,     Dx CA, followed by radiation Rx       Family History   Problem Relation Age of Onset    Dementia Father        Social History     Tobacco Use   Smoking Status Former Smoker    Packs/day: 1.00    Quit date: 1960    Years since quittin.4   Smokeless Tobacco Never Used   Tobacco Comment    Quit smoking: quit 60 years ago       Social History     Substance and Sexual Activity   Alcohol Use No       Current Outpatient Medications   Medication Sig Dispense Refill    atorvastatin (LIPITOR) 40 MG tablet TAKE 1 TABLET EVERY NIGHT 90 tablet 0    acetaminophen (TYLENOL) 500 MG tablet Take 1,000 mg by mouth      allopurinol (ZYLOPRIM) 300 MG tablet Take 300 mg by mouth daily      apixaban (ELIQUIS) 2.5 MG TABS tablet Take 2.5 mg by mouth 2 times daily      aspirin 81 MG EC tablet Take 81 mg by mouth daily      fluticasone (FLONASE) 50 MCG/ACT nasal spray USE 2 SPRAYS IN EACH NOSTRIL EVERY DAY (Patient not taking: Reported on 2022) 48 g 0    traZODone (DESYREL) 50 MG tablet TAKE 1 TABLET EVERY NIGHT 90 tablet 0    benazepril (LOTENSIN) 40 MG tablet Take 40 mg by mouth daily      furosemide (LASIX) 20 MG tablet Take 20 mg by mouth daily       No current facility-administered medications for this visit. Review of Systems:  Review of Systems   Constitutional: Positive for appetite change (decreased), fatigue and unexpected weight change (loss of 40lb ). Negative for chills, diaphoresis and fever. HENT: Positive for trouble swallowing (occasionally). Negative for congestion and sore throat. Eyes: Negative for photophobia and visual disturbance. Respiratory: Positive for shortness of breath (with exertion). Negative for cough, chest tightness and wheezing. Cardiovascular: Negative for chest pain, palpitations and leg swelling. Gastrointestinal: Positive for diarrhea. Negative for abdominal distention, abdominal pain, blood in stool, constipation, nausea and vomiting. Endocrine: Negative for polydipsia, polyphagia and polyuria. Genitourinary: Positive for decreased urine volume. Negative for difficulty urinating, dysuria, flank pain, frequency, hematuria and urgency. Musculoskeletal: Positive for arthralgias. Negative for joint swelling. Skin: Negative for pallor, rash and wound. Neurological: Positive for dizziness (occasional ), weakness and headaches (occasional, mild). Negative for seizures, syncope, facial asymmetry and speech difficulty. Hematological: Negative for adenopathy. Bruises/bleeds easily (on Eliquis). Psychiatric/Behavioral: Negative for dysphoric mood and sleep disturbance. The patient is not nervous/anxious. See HPI for other pertinent positives and negatives. Objective:  Vitals:    06/08/22 1032   BP: (!) 106/57   Site: Right Upper Arm   Position: Sitting   Cuff Size: Large Adult   Pulse: (!) 101   Temp: 97.6 °F (36.4 °C)   TempSrc: Temporal   SpO2: 98%   Weight: 199 lb (90.3 kg)   Height: 5' 7.5\" (1.715 m)       Body mass index is 30.71 kg/m². Physical Exam  Vitals and nursing note reviewed.    Constitutional:       General: He is not in acute distress. Appearance: Normal appearance. He is not ill-appearing or diaphoretic. Comments: Walks with cane    HENT:      Head: Normocephalic and atraumatic. Right Ear: Tympanic membrane, ear canal and external ear normal. There is no impacted cerumen. Left Ear: Tympanic membrane, ear canal and external ear normal. There is no impacted cerumen. Mouth/Throat:      Mouth: Mucous membranes are moist.      Pharynx: Oropharynx is clear. No oropharyngeal exudate or posterior oropharyngeal erythema. Comments: He has a few smooth white spots on his tongue, which he states have been there for years and have not changed. Eyes:      General: No scleral icterus. Right eye: No discharge. Left eye: No discharge. Pupils: Pupils are equal, round, and reactive to light. Neck:      Vascular: No carotid bruit. Cardiovascular:      Rate and Rhythm: Normal rate and regular rhythm. Heart sounds: Murmur heard. Pulmonary:      Effort: Pulmonary effort is normal. No respiratory distress. Breath sounds: Normal breath sounds. No wheezing, rhonchi or rales. Chest:   Breasts:      Right: No axillary adenopathy or supraclavicular adenopathy. Left: No axillary adenopathy or supraclavicular adenopathy. Abdominal:      General: Bowel sounds are normal. There is no distension. Palpations: Abdomen is soft. There is no fluid wave. Tenderness: There is no abdominal tenderness. There is no guarding. Hernia: A hernia is present. Musculoskeletal:      Cervical back: Neck supple. No rigidity or tenderness. Right lower leg: No edema. Left lower leg: No edema. Lymphadenopathy:      Head:      Right side of head: No submental, submandibular, tonsillar, preauricular, posterior auricular or occipital adenopathy. Left side of head: No submental, submandibular, tonsillar, preauricular, posterior auricular or occipital adenopathy.       Cervical: No cervical adenopathy. Upper Body:      Right upper body: No supraclavicular, axillary, pectoral or epitrochlear adenopathy. Left upper body: No supraclavicular, axillary, pectoral or epitrochlear adenopathy. Skin:     General: Skin is warm and dry. Neurological:      Mental Status: He is alert and oriented to person, place, and time. Sensory: Sensation is intact. Motor: Motor function is intact. Coordination: Coordination is intact. Psychiatric:         Mood and Affect: Mood normal.         Speech: Speech normal.         Behavior: Behavior normal.                  Lab Results   Component Value Date    WBC 7.9 11/19/2021    HGB 12.7 (L) 11/19/2021    HCT 38.9 11/19/2021    MCV 98 (H) 11/19/2021     11/19/2021   ,   Lab Results   Component Value Date     11/19/2021    K 5.3 11/19/2021     11/19/2021    CO2 20 11/19/2021    BUN 41 11/19/2021    CREATININE 1.93 11/19/2021    GLUCOSE 97 11/19/2021    CALCIUM 9.9 11/19/2021    ,   Lab Results   Component Value Date    TSH 3.920 11/27/2019     Lab Results   Component Value Date    ALT 29 11/19/2021    AST 24 11/19/2021    ALKPHOS 111 11/19/2021    BILITOT 0.5 11/19/2021   ,   Lab Results   Component Value Date    LABA1C 5.3 02/01/2020     Lab Results   Component Value Date     02/01/2020       PHQ-9  6/8/2022   Little interest or pleasure in doing things 0   Little interest or pleasure in doing things -   Feeling down, depressed, or hopeless 0   PHQ-2 Score 0   Total Score PHQ 2 -   PHQ-9 Total Score 0        Assessment/Plan:      Health Maintenance Due   Topic Date Due    Annual Wellness Visit (AWV)  Never done    Prostate Specific Antigen (PSA) Screening or Monitoring  Never done    Shingles vaccine (2 of 3) 02/03/2016          Veronica Edge was seen today for dizziness. Diagnoses and all orders for this visit:    Weight loss  -     CBC with Auto Differential; Future  -     TSH with Reflex;  Future  -     Vitamin B12; Future  -     Folate; Future  -     Comprehensive Metabolic Panel; Future  -     Urinalysis with Reflex to Culture; Future  -     PSA, Total and Free; Future  -     HIV 1/2 Ag/Ab, 4TH Generation,W Rflx Confirm; Future  -     XR CHEST (2 VIEWS); Future  -     Cancel: XR ABDOMEN (2 VIEWS); Future  -     Cancel: XR PELVIS (1-2 VIEWS); Future  -     Hemoglobin A1C; Future  -     Occult Blood, Fecal; Future  -     C-Reactive Protein; Future  -     Sedimentation Rate; Future  -     Hepatitis C Ab, Rflx to Qt by PCR; Future  -     Cancel: XR ACUTE ABD SERIES CHEST 1 VW; Future  -     CBC with Auto Differential  -     TSH with Reflex  -     Vitamin B12  -     Folate  -     Comprehensive Metabolic Panel  -     Urinalysis with Reflex to Culture  -     PSA, Total and Free  -     HIV 1/2 Ag/Ab, 4TH Generation,W Rflx Confirm  -     Hemoglobin A1C  -     C-Reactive Protein  -     Sedimentation Rate  -     Hepatitis C Ab, Rflx to Qt by PCR  -     Occult Blood, Fecal  -     XR ABDOMEN (2 VIEWS); Future    Poor appetite  -     CBC with Auto Differential; Future  -     TSH with Reflex; Future  -     Vitamin B12; Future  -     Folate; Future  -     Comprehensive Metabolic Panel; Future  -     Urinalysis with Reflex to Culture; Future  -     PSA, Total and Free; Future  -     HIV 1/2 Ag/Ab, 4TH Generation,W Rflx Confirm; Future  -     XR CHEST (2 VIEWS); Future  -     Cancel: XR ABDOMEN (2 VIEWS); Future  -     Cancel: XR PELVIS (1-2 VIEWS); Future  -     Hemoglobin A1C; Future  -     Occult Blood, Fecal; Future  -     C-Reactive Protein; Future  -     Sedimentation Rate; Future  -     Hepatitis C Ab, Rflx to Qt by PCR;  Future  -     Cancel: XR ACUTE ABD SERIES CHEST 1 VW; Future  -     CBC with Auto Differential  -     TSH with Reflex  -     Vitamin B12  -     Folate  -     Comprehensive Metabolic Panel  -     Urinalysis with Reflex to Culture  -     PSA, Total and Free  -     HIV 1/2 Ag/Ab, 4TH Generation,W Rflx Confirm  - Hemoglobin A1C  -     C-Reactive Protein  -     Sedimentation Rate  -     Hepatitis C Ab, Rflx to Qt by PCR  -     Occult Blood, Fecal  -     XR ABDOMEN (2 VIEWS); Future    Weakness  -     CBC with Auto Differential; Future  -     TSH with Reflex; Future  -     Vitamin B12; Future  -     Folate; Future  -     Comprehensive Metabolic Panel; Future  -     Urinalysis with Reflex to Culture; Future  -     PSA, Total and Free; Future  -     HIV 1/2 Ag/Ab, 4TH Generation,W Rflx Confirm; Future  -     XR CHEST (2 VIEWS); Future  -     Cancel: XR ABDOMEN (2 VIEWS); Future  -     Cancel: XR PELVIS (1-2 VIEWS); Future  -     Hemoglobin A1C; Future  -     Occult Blood, Fecal; Future  -     C-Reactive Protein; Future  -     Sedimentation Rate; Future  -     Hepatitis C Ab, Rflx to Qt by PCR; Future  -     Cancel: XR ACUTE ABD SERIES CHEST 1 VW; Future  -     CBC with Auto Differential  -     TSH with Reflex  -     Vitamin B12  -     Folate  -     Comprehensive Metabolic Panel  -     Urinalysis with Reflex to Culture  -     PSA, Total and Free  -     HIV 1/2 Ag/Ab, 4TH Generation,W Rflx Confirm  -     Hemoglobin A1C  -     C-Reactive Protein  -     Sedimentation Rate  -     Hepatitis C Ab, Rflx to Qt by PCR  -     Occult Blood, Fecal  -     XR ABDOMEN (2 VIEWS); Future    Chronic fatigue  -     CBC with Auto Differential; Future  -     TSH with Reflex; Future  -     Vitamin B12; Future  -     Folate; Future  -     Comprehensive Metabolic Panel; Future  -     Urinalysis with Reflex to Culture; Future  -     PSA, Total and Free; Future  -     HIV 1/2 Ag/Ab, 4TH Generation,W Rflx Confirm; Future  -     XR CHEST (2 VIEWS); Future  -     Cancel: XR ABDOMEN (2 VIEWS); Future  -     Cancel: XR PELVIS (1-2 VIEWS); Future  -     Hemoglobin A1C; Future  -     Occult Blood, Fecal; Future  -     C-Reactive Protein; Future  -     Sedimentation Rate; Future  -     Hepatitis C Ab, Rflx to Qt by PCR;  Future  -     Cancel: XR ACUTE ABD SERIES CHEST 1 VW; Future  -     CBC with Auto Differential  -     TSH with Reflex  -     Vitamin B12  -     Folate  -     Comprehensive Metabolic Panel  -     Urinalysis with Reflex to Culture  -     PSA, Total and Free  -     HIV 1/2 Ag/Ab, 4TH Generation,W Rflx Confirm  -     Hemoglobin A1C  -     C-Reactive Protein  -     Sedimentation Rate  -     Hepatitis C Ab, Rflx to Qt by PCR  -     Occult Blood, Fecal  -     XR ABDOMEN (2 VIEWS); Future    Taste sense altered  -     CBC with Auto Differential; Future  -     TSH with Reflex; Future  -     Vitamin B12; Future  -     Folate; Future  -     Comprehensive Metabolic Panel; Future  -     Urinalysis with Reflex to Culture; Future  -     PSA, Total and Free; Future  -     HIV 1/2 Ag/Ab, 4TH Generation,W Rflx Confirm; Future  -     XR CHEST (2 VIEWS); Future  -     Cancel: XR ABDOMEN (2 VIEWS); Future  -     Cancel: XR PELVIS (1-2 VIEWS); Future  -     Hemoglobin A1C; Future  -     Occult Blood, Fecal; Future  -     C-Reactive Protein; Future  -     Sedimentation Rate; Future  -     Hepatitis C Ab, Rflx to Qt by PCR; Future  -     Cancel: XR ACUTE ABD SERIES CHEST 1 VW; Future  -     CBC with Auto Differential  -     TSH with Reflex  -     Vitamin B12  -     Folate  -     Comprehensive Metabolic Panel  -     Urinalysis with Reflex to Culture  -     PSA, Total and Free  -     HIV 1/2 Ag/Ab, 4TH Generation,W Rflx Confirm  -     Hemoglobin A1C  -     C-Reactive Protein  -     Sedimentation Rate  -     Hepatitis C Ab, Rflx to Qt by PCR  -     Occult Blood, Fecal  -     XR ABDOMEN (2 VIEWS); Future    Dizziness  -     EKG 12 Lead; Future  -     CBC with Auto Differential; Future  -     TSH with Reflex; Future  -     Vitamin B12; Future  -     Folate; Future  -     Comprehensive Metabolic Panel; Future  -     Urinalysis with Reflex to Culture; Future  -     PSA, Total and Free; Future  -     HIV 1/2 Ag/Ab, 4TH Generation,W Rflx Confirm; Future  -     XR CHEST (2 VIEWS);  Future  - Cancel: XR ABDOMEN (2 VIEWS); Future  -     Cancel: XR PELVIS (1-2 VIEWS); Future  -     EKG 12 Lead  -     Hemoglobin A1C; Future  -     Occult Blood, Fecal; Future  -     C-Reactive Protein; Future  -     Sedimentation Rate; Future  -     Hepatitis C Ab, Rflx to Qt by PCR; Future  -     Cancel: XR ACUTE ABD SERIES CHEST 1 VW; Future  -     CBC with Auto Differential  -     TSH with Reflex  -     Vitamin B12  -     Folate  -     Comprehensive Metabolic Panel  -     Urinalysis with Reflex to Culture  -     PSA, Total and Free  -     HIV 1/2 Ag/Ab, 4TH Generation,W Rflx Confirm  -     Hemoglobin A1C  -     C-Reactive Protein  -     Sedimentation Rate  -     Hepatitis C Ab, Rflx to Qt by PCR  -     Occult Blood, Fecal  -     XR ABDOMEN (2 VIEWS); Future    History of prostate cancer  -     PSA, Total and Free; Future  -     PSA, Total and Free    Elevated glucose level  -     Hemoglobin A1C; Future  -     Hemoglobin A1C        Encouraged to contact office with any concerns prior to next visit. Return in about 3 weeks (around 6/29/2022), or if symptoms worsen or fail to improve, for Follow up with PCP.     Shannan Vargas, APRN - CNP

## 2022-06-12 NOTE — ED NOTES
I have reviewed discharge instructions with the patient. The patient verbalized understanding. Patient left ED via Discharge Method: ambulatory to Home with family. Opportunity for questions and clarification provided. Patient given 1 scripts. To continue your aftercare when you leave the hospital, you may receive an automated call from our care team to check in on how you are doing. This is a free service and part of our promise to provide the best care and service to meet your aftercare needs.  If you have questions, or wish to unsubscribe from this service please call [phone]. Thank you for Choosing our [facility] Emergency Department.         Kodak Alvarez RN  06/12/22 [id number]

## 2022-06-12 NOTE — ED TRIAGE NOTES
Patient presents to ED c/o photosensitivity, recent weight loss, generalized weakness. Patient was seen at his PCP this past Tuesday for his recent weight loss. Patient has another appointment for his GI issues this coming Tuesday.

## 2022-06-12 NOTE — ED PROVIDER NOTES
[facility] Emergency Department Provider Note                   PCP:                Emil Willoughby MD               Age: 80 y.o. Sex: male       ICD-10-CM    1. Weight loss  R63.4    2. Bad taste in mouth  R43.8        DISPOSITION Decision To Discharge 06/12/2022 05:30:17 PM       Discharge Medication List as of 6/12/2022  6:27 PM      START taking these medications    Details   ondansetron (ZOFRAN) 4 MG tablet Take 1 tablet by mouth every 8 hours as needed for Nausea or Vomiting, Disp-12 tablet, R-0Print             Orders Placed This Encounter   Procedures    COVID-19, Rapid    CT HEAD WO CONTRAST    CMP    CBC with Auto Differential    EKG 12 Lead    Saline lock Colon MD Bonnie 5:57 PM      MDM  Number of Diagnoses or Management Options  Bad taste in mouth  Weight loss: new, needed workup  Diagnosis management comments: 79 yo M presents w/ chief complaint of weight loss. Pt also reports recent \"bad taste\" in his mouth and decreased appetite. Family state they have recently tried Ensure. Pt recently seen by PCP in regards to weight loss on 6/8 w/ labs and CXR as well as XR abdomen. Discussed obtaining CT chest abdomen and pelvis at this time and further additional work-up for unintentional weight loss. Discussed my concern for possible underlying malignancy leading to recent weight loss. Both patient and family decline as they state they have upcoming appointment with PCP in regards to this. VSS. Afebrile. Abdomen soft, NTND. No rebound or guarding. H&H stable. No leukocytosis. Cr improved from 6/8 (2.5) to 2.14 today. K 5.4. CT head obtained given report of symptoms with no acute intracranial abnormality. Rapid COVID swab negative. Pt tolerating po. 1L NS IVF bolus administered. Again, discussed with patient and family need for further work-up including CT chest abdomen and pelvis.   They declines additional work-up and state they have close outpatient follow-up. Discussed risks. Pt and family verbalize understanding. Given strict return precautions. Will discharge home with Zofran. Amount and/or Complexity of Data Reviewed  Clinical lab tests: ordered and reviewed  Tests in the radiology section of CPT®: ordered and reviewed  Tests in the medicine section of CPT®: ordered and reviewed  Review and summarize past medical records: yes (XR abdomen on 6/8/22:    FINDINGS:   The bowel gas pattern is unremarkable. No abnormally dilated bowel loops. No free air under the hemidiaphragm. No radiopaque calculi.    The lungs are clear. Calcified granuloma right lung base  Surgical clips right upper quadrant. Degenerative changes lumbar spine.    ---------------------------------------------------------------------------  Chest X-ray on 6/8/22:  FINDINGS:   Lungs: Calcified nodules in both debi and right lower lobe. Lungs are  hyperinflated. Costophrenic angles: are sharp. Heart size: is normal.   Pulmonary vasculature: is unremarkable. Aorta: Mildly tortuous. .  Included portion of the upper abdomen: is unremarkable. Bones: No gross bony lesions.     Other: None.        Impression  Negative for acute abnormality. Calcified granulomas.    )  Independent visualization of images, tracings, or specimens: yes    Risk of Complications, Morbidity, and/or Mortality  Presenting problems: moderate  Diagnostic procedures: moderate  Management options: moderate    Patient Progress  Patient progress: hao Murillo is a 80 y.o. male who presents to the Emergency Department with chief complaint of weight loss. Chief Complaint   Patient presents with    Weight Loss      70-year-old male with history of CKD, gout, hyperlipidemia, aortic valve stenosis, PUD, hypertension, prostate cancer status post radiation treatment, CVA, anticoagulated on Eliquis presents with complaint of weight loss over the past 3 months.   Patient states he recently followed up with his primary care physician in regards to this. Basic labs and x-rays were done. Patient states he has a follow-up appointment on this Tuesday. States that he has had intermittently worsening vision over the past several days-weeks. Denies eye pain. Denies vision loss or vision difficulty at this time. Denies numbness, tingling or weakness, facial droop, chest pain, shortness of breath. States that while eating today he felt like he had difficulty but is tolerating p.o. Denies abdominal pain, melena, hematochezia, hemoptysis, productive cough. The history is provided by the patient. No  was used. Review of Systems   Constitutional: Negative for chills, diaphoresis and fever. HENT: Negative for congestion, rhinorrhea, sore throat and voice change. Eyes: Negative for pain and redness. Respiratory: Negative for cough and shortness of breath. Cardiovascular: Negative for chest pain and palpitations. Gastrointestinal: Positive for nausea. Negative for abdominal pain, blood in stool, constipation, diarrhea, rectal pain and vomiting. Genitourinary: Negative for dysuria and flank pain. Musculoskeletal: Negative for arthralgias, myalgias, neck pain and neck stiffness. Skin: Negative for rash and wound. Neurological: Positive for headaches. Negative for tremors, seizures, facial asymmetry, speech difficulty, weakness and numbness. Hematological: Does not bruise/bleed easily. Psychiatric/Behavioral: Negative for confusion.        Past Medical History:   Diagnosis Date    [facility]bro[facility]) [facility] [facility]     Cancer of prostate ([facility] [address] 2001    radiation tx     CKD (chronic kidney disease) stage 3, GFR 30-59 ml/min [facility]) 8/18/2012    Gout 1/78/0483    Metabolic syndrome 9/21/5846    Mixed hyperlipidemia 8/18/2012    Moderate aortic valve stenosis     Peptic ulcer disease 8/18/2012    Unspecified essential hypertension 8/18/2012    Urinary retention         Past Surgical History:   Procedure Laterality Date    APPENDECTOMY  1950    CHOLECYSTECTOMY      COLONOSCOPY  2002    GASTRECTOMY      [facility], 1999    TURP  2000, 2015    Dx CA, followed by radiation Rx        Family History   Problem Relation Age of Onset    Dementia Father            Social Connections:     Frequency of Communication with Friends and Family: Not on file    Frequency of Social Gatherings with Friends and Family: Not on file    Attends Hinduism Services: Not on file    Active Member of Clubs or Organizations: Not on file    Attends Club or Organization Meetings: Not on file    Marital Status: Not on file        Allergies   Allergen Reactions    Gabapentin Other (See Comments)     Dizzy. Pt reports that he's now taking the medicine at night before bed - no longer having issues and is continuing to take this med.  9/22/2016        Vitals signs and nursing note reviewed. No data found. Physical Exam  Vitals and nursing note reviewed. Constitutional:       Appearance: Normal appearance. HENT:      Head: Normocephalic. Nose: Nose normal. No congestion. Mouth/Throat:      Mouth: Mucous membranes are moist.      Pharynx: Oropharynx is clear. No oropharyngeal exudate or posterior oropharyngeal erythema. Comments: Uvula midline. No posterior OP swelling or edema. No trismus or stridor. Pt tolerating secretions. Eyes:      General:         Right eye: No discharge. Left eye: No discharge. Extraocular Movements: Extraocular movements intact. Conjunctiva/sclera: Conjunctivae normal.      Pupils: Pupils are equal, round, and reactive to light. Comments: No nystagmus. Neck:      Comments: No nuchal rigidity. Cardiovascular:      Rate and Rhythm: Normal rate. Pulses: Normal pulses. Heart sounds: Normal heart sounds. Pulmonary:      Effort: Pulmonary effort is normal.      Breath sounds: Normal breath sounds. Comments: CTAB. Abdominal:      General: Bowel sounds are normal. There is no distension. Palpations: Abdomen is soft. There is no mass. Tenderness: There is no abdominal tenderness. There is no guarding or rebound. Comments: Soft, NT. No rebound or guarding. No pulsatile mass. No CVAT. Musculoskeletal:         General: No tenderness or deformity. Normal range of motion. Cervical back: Normal range of motion. Skin:     General: Skin is warm. Findings: No erythema or rash. Neurological:      General: No focal deficit present. Mental Status: He is alert and oriented to person, place, and time. Cranial Nerves: No cranial nerve deficit. Sensory: No sensory deficit. Motor: No weakness. Comments: No focal deficits. Strength 5/5 throughout. Normal sensory exam. No facial droop. No aphasia or dysarthria.            EKG 12 Lead    Date/Time: 6/14/2022 5:57 PM  Performed by: Marielos Navarrete MD  Authorized by: Marielos Navarrete MD     ECG reviewed by ED Physician in the absence of a cardiologist: yes    Rate:     ECG rate:  71    ECG rate assessment: normal    Rhythm:     Rhythm: sinus rhythm    QRS:     QRS axis:  Normal    QRS intervals:  Normal  Conduction:     Conduction: abnormal      Abnormal conduction: complete LBBB    ST segments:     ST segments:  Normal  T waves:     T waves: normal            Labs Reviewed   COMPREHENSIVE METABOLIC PANEL - Abnormal; Notable for the following components:       Result Value    Potassium 5.4 (*)     CO2 19 (*)     Glucose 121 (*)     BUN 44 (*)     CREATININE 2.14 (*)     GFR  38 (*)     GFR Non-African American 31 (*)     Calcium 10.8 (*)     ALT 10 (*)     All other components within normal limits   CBC WITH AUTO DIFFERENTIAL - Abnormal; Notable for the following components:    RBC 3.78 (*)     Hemoglobin 12.6 (*)     Hematocrit 39.0 (*)     .2 (*)     MCH 33.3 (*)     RDW 15.5 (*) All other components within normal limits   COVID-19, RAPID        CT HEAD WO CONTRAST   Final Result   1. No evidence of acute intracranial abnormality. Chronic changes as above. ED Course as of 06/14/22 1757   Sun Jun 12, 2022   1529 Creatinine(!): 2.14 [DF]   1529 Potassium(!): 5.4 [DF]   1533 Hemoglobin Quant(!): 12.6 [DF]   1534 Hematocrit(!): 39.0 [DF]   1534 CO2(!): 19 [DF]   1729 SARS-CoV-2, Rapid: Not detected [DF]   5634 Chest X-ray on 6/8/22:    FINDINGS:   Lungs: Calcified nodules in both debi and right lower lobe. Lungs are  hyperinflated. Costophrenic angles: are sharp. Heart size: is normal.   Pulmonary vasculature: is unremarkable. Aorta: Mildly tortuous. .  Included portion of the upper abdomen: is unremarkable. Bones: No gross bony lesions.     Other: None.        Impression  Negative for acute abnormality. Calcified granulomas. [DF]      ED Course User Index  [DF] Cj wKan MD        Voice dictation software was used during the making of this note. This software is not perfect and grammatical and other typographical errors may be present. This note has not been completely proofread for errors.      Gladys Ku MD  06/14/22 [id number]

## 2022-06-14 NOTE — TELEPHONE ENCOUNTER
[facility] called and I got the call . They state that patient daughter is upset because he has an appt on the 29th of June for Weight loss. He wanted to get in sooner. And I informed her that Dr Jayden Azul is full till then . He was seen with Maylin Pratt Np on the  06/08/2022 for Dizziness, weakness  and weight loss . Patient had labs . He was also seen in the ER .  I informed her that I would send a message to his MA and she will talk to Dr Jayden Azlu and see what he wants to do

## 2022-06-14 NOTE — TELEPHONE ENCOUNTER
Patient daughter Dafne Liriano called regarding patient being seen sooner due to him having weight loss,weakness, and the only thing he is able to keep down is gatorade. Informed her the earliest appointment he has is 6/29/22, after speaking with Betty Loud if that's the only thing the patient can keep down he will need to proceed to the ER because there isn't anything at an office level we can do to correct that. She is very frustrated out of concern for her father.

## 2022-06-15 NOTE — TELEPHONE ENCOUNTER
----- Message from Amparo Kong sent at 6/14/2022  4:44 PM EDT -----  Subject: Referral Request    QUESTIONS   Reason for referral request? PT is coming in on 6/29 at 10am. When he was   released from ED, the doctor recommended he get CT scan of chest, adwomen   and pelvis. can he get a referral to have this done before his appt on   6/29   Has the physician seen you for this condition before? Yes  Select a date? 2022-06-08  Select the Provider the patient wants to be referred to, if known (PCP or   Specialist)? Emil Willoughby   Preferred Specialist (if applicable)? Do you already have an appointment scheduled? Yes  Select Scheduled Date? 2022-06-29  Select Scheduled Physician? Emil Willoughby   Additional Information for Provider? PT went to ER for having trouble   swallowing, not eating and weak  ---------------------------------------------------------------------------  --------------  CALL BACK INFO  What is the best way for the office to contact you? OK to leave message on   voicemail  Preferred Call Back Phone Number? [phone]  ---------------------------------------------------------------------------  --------------  SCRIPT ANSWERS  Relationship to Patient? Third Party  Third Party Type?  Other  Other Third Party Type? daughter  Representative Name? Monica Bragg

## 2022-06-16 PROBLEM — K57.90 DIVERTICULOSIS: Status: ACTIVE | Noted: 2022-01-01

## 2022-06-16 PROBLEM — I35.0 AORTIC STENOSIS, MODERATE: Status: ACTIVE | Noted: 2017-04-21

## 2022-06-16 PROBLEM — R62.51 FAILURE TO THRIVE (CHILD): Status: ACTIVE | Noted: 2022-01-01

## 2022-06-16 PROBLEM — K57.92 DIVERTICULITIS: Status: ACTIVE | Noted: 2022-01-01

## 2022-06-16 NOTE — H&P
Hospitalist Admission History and Physical         NAME:            Joann Holley    Age:                80 y.o.    :               1932    MRN:              [id number]    PCP: Ashu Holt MD    Consulting MD:    Treatment Team: Attending Provider: Marianela Travis DO; Registered Nurse: Malgorzata Donohue RN         Chief Complaint   Patient presents with    Hypotension   HPI:    Patient is a 80 y.o. male who presented to the ED for cc generalized weakness and 40 pound weight loss for the past two months. Family hx of breast cancer. BP noted to be hypotensive on arrival along with dark red blood per rectum. Stool occult positive. Hx of prostate cancer s/p radiation, CVA, CKD stage 3, HLD, known urinary retention but does not wear a Mckeon at home, aortic valve stenosis, and irregular heart rhythm on low dose Eliquis. Vitals- stable but BP borderline low    Labs- CO2 15, creatine 2.2 from baseline 2. WBC 14.9, Hg 11.9 from baseline 12.7. CT chest abdomen pelvis without contrast showed -   No evidence of malignancy in the chest, abdomen, or pelvis by noncontrast CT.       2. Extensive colonic diverticulosis with mild acute diverticulitis involving the   descending colon. 3. Severely distended urinary bladder with severe right hydroureteronephrosis,   possibly resulting from the presumed chronic bladder outlet obstruction. No   obstructing etiology identified at the [facility]. 4. Bilateral renal atrophy with 3 mm nonobstructing right renal calculus.        Past Medical History:   Diagnosis Date    [facility]     Cancer of prostate ([facility]     radiation tx     CKD (chronic kidney disease) stage 3, GFR 30-59 ml/min ([facility]) 2012    Gout 3/39/9194    Metabolic syndrome     Mixed hyperlipidemia 2012    Moderate aortic valve stenosis     Peptic ulcer disease 2012    Unspecified essential hypertension 2012    Urinary retention Past Surgical History:   Procedure Laterality Date    APPENDECTOMY  1950    CHOLECYSTECTOMY      COLONOSCOPY  2002    GASTRECTOMY      V&A,     TURP  ,     Dx CA, followed by radiation Rx            Family History   Problem Relation Age of Onset    Dementia Father        Family history reviewed and negative except as noted above. Social History     Social History Narrative    Lives alone, , drives, two daughters he is close to, [facility] [phone], [place], close to both of them            Social History     Tobacco Use    Smoking status: Former Smoker     Packs/day: 1.00     Quit date: 1960     Years since quittin.4    Smokeless tobacco: Never Used    Tobacco comment: Quit smoking: quit 60 years ago   Substance Use Topics    Alcohol use: No            Social History     Substance and Sexual Activity   Drug Use No                 Allergies   Allergen Reactions    Gabapentin Other (See Comments)     Dizzy. Pt reports that he's now taking the medicine at night before bed - no longer having issues and is continuing to take this med.  2016            Prior to Admission medications    Medication Sig Start Date End Date Taking?  Authorizing Provider   ondansetron (ZOFRAN) 4 MG tablet Take 1 tablet by mouth every 8 hours as needed for Nausea or Vomiting 22   Cj Hou MD   fluticasone (FLONASE) 50 MCG/ACT nasal spray USE 2 SPRAYS IN EACH NOSTRIL EVERY DAY  Patient not taking: Reported on 2022   Yamila Gann MD   traZODone (DESYREL) 50 MG tablet TAKE 1 TABLET EVERY NIGHT 22   Yamila Gann MD   atorvastatin (LIPITOR) 40 MG tablet TAKE 1 TABLET EVERY NIGHT 22   Yamila Gann MD   acetaminophen (TYLENOL) 500 MG tablet Take 1,000 mg by mouth    Ar Automatic Reconciliation   allopurinol (ZYLOPRIM) 300 MG tablet Take 300 mg by mouth daily 3/21/22   Ar Automatic Reconciliation   apixaban (ELIQUIS) 2.5 MG TABS tablet Take 2.5 mg by mouth 2 times daily 1/31/22   Ar Automatic Reconciliation   aspirin 81 MG EC tablet Take 81 mg by mouth daily 2/2/20   Ar Automatic Reconciliation   benazepril (LOTENSIN) 40 MG tablet Take 40 mg by mouth daily 3/21/22   Ar Automatic Reconciliation   furosemide (LASIX) 20 MG tablet Take 20 mg by mouth daily 8/3/21   Ar Automatic Reconciliation                      Review of Systems         Constitutional: NAD    Eyes:  no change in visual acuity, no photophobia    Ears, nose, mouth, throat, and face: no  Odynphagia, dysphagia, no thrush or exudate, negative for chronic sinus congestion, recurrent headaches    Respiratory: negative for SOB, hemoptysis or cough    Cardiovascular: negative for CP, palpitations, or PND    Gastrointestinal: lower abdominal pain, poor oral intake stating he cannot taste anything    Genitourinary: no urgency, frequency, or dysuria, no nocturia    Integument/breast: negative for skin rash or skin lesions    Hematologic/lymphatic: negative for known bleeding disorder    Musculoskeletal:negative for joint pain or joint tenderness    Neurological: generalized weakness    Behavioral/Psych: negative for depression or chronic anxiety,    Endocrine: negative for polydyspia, polyuria or intolerance to heat or cold    Allergic/Immunologic: negative for chronic allergic rhinitis, or known connective tissue disorder              Objective:         Patient Vitals for the past 24 hrs:   Temp Pulse Resp BP SpO2   06/16/22 1245 -- 89 17 (!) 103/54 98 %   06/16/22 1230 -- 88 15 112/65 99 %   06/16/22 1215 -- 82 18 (!) 78/60 98 %   06/16/22 1200 -- 85 18 (!) 99/52 97 %   06/16/22 1120 -- 91 18 (!) 112/57 94 %   06/16/22 1100 -- 97 18 (!) 87/49 98 %   06/16/22 1044 98 °F (36.7 °C) 94 16 (!) 80/56 97 %            06/16 0701 - 06/16 1900  In: 1000 [I.V.:1000]  Out: -     No intake/output data recorded.          Data Review:   Recent Results (from the past 24 hour(s))   EKG 12 Lead Collection Time: 06/16/22 10:47 AM   Result Value Ref Range    Ventricular Rate 94 BPM    Atrial Rate 94 BPM    P-R Interval 159 ms    QRS Duration 117 ms    Q-T Interval 367 ms    QTc Calculation (Bazett) 459 ms    P Axis 54 degrees    R Axis -77 degrees    T Axis 56 degrees    Diagnosis Sinus rhythm    Comprehensive Metabolic Panel    Collection Time: 06/16/22 10:56 AM   Result Value Ref Range    Sodium 138 138 - 145 mmol/L    Potassium 4.9 3.5 - 5.1 mmol/L    Chloride 112 (H) 98 - 107 mmol/L    CO2 15 (L) 21 - 32 mmol/L    Anion Gap 11 7 - 16 mmol/L    Glucose 168 (H) 65 - 100 mg/dL    BUN 36 (H) 8 - 23 MG/DL    CREATININE 2.20 (H) 0.8 - 1.5 MG/DL    GFR  36 (L) >60 ml/min/1.73m2    GFR Non- 30 (L) >60 ml/min/1.73m2    Calcium 9.9 8.3 - 10.4 MG/DL    Total Bilirubin 0.6 0.2 - 1.1 MG/DL    ALT 15 12 - 65 U/L    AST 17 15 - 37 U/L    Alk Phosphatase 136 50 - 136 U/L    Total Protein 6.1 (L) 6.3 - 8.2 g/dL    Albumin 3.1 (L) 3.2 - 4.6 g/dL    Globulin 3.0 2.3 - 3.5 g/dL    Albumin/Globulin Ratio 1.0 (L) 1.2 - 3.5     CBC with Auto Differential    Collection Time: 06/16/22 10:56 AM   Result Value Ref Range    WBC 14.9 (H) 4.3 - 11.1 K/uL    RBC 3.66 (L) 4.23 - 5.6 M/uL    Hemoglobin 11.9 (L) 13.6 - 17.2 g/dL    Hematocrit 37.9 (L) 41.1 - 50.3 %    .6 (H) 79.6 - 97.8 FL    MCH 32.5 26.1 - 32.9 PG    MCHC 31.4 31.4 - 35.0 g/dL    RDW 15.9 (H) 11.9 - 14.6 %    Platelets 477 404 - 669 K/uL    MPV 12.6 (H) 9.4 - 12.3 FL    nRBC 0.00 0.0 - 0.2 K/uL    Differential Type AUTOMATED      Seg Neutrophils 84 (H) 43 - 78 %    Lymphocytes 9 (L) 13 - 44 %    Monocytes 4 4.0 - 12.0 %    Eosinophils % 2 0.5 - 7.8 %    Basophils 0 0.0 - 2.0 %    Immature Granulocytes 1 0.0 - 5.0 %    Segs Absolute 12.7 (H) 1.7 - 8.2 K/UL    Absolute Lymph # 1.3 0.5 - 4.6 K/UL    Absolute Mono # 0.6 0.1 - 1.3 K/UL    Absolute Eos # 0.2 0.0 - 0.8 K/UL    Basophils Absolute 0.0 0.0 - 0.2 K/UL    Absolute Immature Granulocyte 0.1 0.0 - 0.5 K/UL   Lactate, Sepsis    Collection Time: 06/16/22 10:56 AM   Result Value Ref Range    Lactic Acid, Sepsis 3.1 (H) 0.4 - 2.0 MMOL/L   Troponin    Collection Time: 06/16/22 10:56 AM   Result Value Ref Range    Troponin, High Sensitivity 12.0 0 - 14 pg/mL   Magnesium    Collection Time: 06/16/22 10:56 AM   Result Value Ref Range    Magnesium 2.0 1.8 - 2.4 mg/dL   Lipase    Collection Time: 06/16/22 10:56 AM   Result Value Ref Range    Lipase 92 73 - 393 U/L   TSH    Collection Time: 06/16/22 10:56 AM   Result Value Ref Range    TSH, 3RD GENERATION 2.830 0.358 - 3.740 uIU/mL   Protime-INR    Collection Time: 06/16/22 10:57 AM   Result Value Ref Range    Protime 16.0 (H) 12.6 - 14.5 sec    INR 1.2     Urinalysis with Reflex to Culture    Collection Time: 06/16/22 12:18 PM    Specimen: Urine   Result Value Ref Range    Color, UA YELLOW/STRAW      Appearance CLEAR      Specific Gravity, UA 1.013 1.001 - 1.023      pH, Urine 5.0 5.0 - 9.0      Protein, UA Negative NEG mg/dL    Glucose, UA Negative mg/dL    Ketones, Urine Negative NEG mg/dL    Bilirubin Urine Negative NEG      Blood, Urine Negative NEG      Urobilinogen, Urine 0.2 0.2 - 1.0 EU/dL    Nitrite, Urine Negative NEG      Leukocyte Esterase, Urine Negative NEG      Urine Culture if Indicated CULTURE NOT INDICATED BY UA RESULT      WBC, UA 0-4 (A) NORM /hpf    RBC, UA 0-5 (A) NORM /hpf    BACTERIA, URINE Negative (A) NORM /hpf    Epithelial Cells UA 0-5 (A) NORM /hpf    Casts 0-2 (A) NORM /lpf    Mucus, UA 0 0 /lpf            Physical Exam:         General:    Alert, cooperative    Eyes:    Conjunctivae/corneas clear. PERRL    Ears:    Normal     Nose:    Nares normal.     Mouth/Throat:    Dry tongue, blistering to left lateral side of tongue    Neck:    Supple, symmetrical, trachea midline, no adenopathy, thyroid: no enlargment/tenderness/nodules, no carotid bruit and no JVD.     Back:     deferred    Lungs:     Clear to auscultation bilaterally. Heart:    Regular rate and rhythm, S1, S2 normal    Abdomen:     Soft, non-tender. Bowel sounds normal. No masses,  No organomegaly. Extremities:    Extremities with no obvious edema. Skin:    Pale    Neurologic:    CNII-XII intact. Limited ROM in bed. Assessment and Plan         Principal Problem:    Diverticulitis  Active Problems:    Diverticulosis    Failure to thrive (child)    Irregular cardiac rhythm    Hypertension    Mixed hyperlipidemia    Stage 3 chronic kidney disease (HCC)    Aortic stenosis, moderate    Urinary retention  Resolved Problems:    * No resolved hospital problems. *    Diverticulitis - IV zosyn. Diverticulosis - Per nursing staff, stool without over bleeding on exam. low fat/high fiber diet. Trend Hg but actually very close to his baseline. Since with current diverticulitis, I do not think endoscopy needed at this time due to increased risk of perforation. Certainly, will need Nuclear med scan if significant hemorrhaging noted. Failure to thrive - Consult nutrition. No obvious masses on CT. Metabolic acidosis - Na bicarb    HTN- Borderline hypotension. Will hold his lasix and benazepril. Improving with IV fluids. HLD - statin    Known urinary retention - May need long term Mckeon. Will need outpatient follow up with urology. Mckeon has been placed. Strict Is and Os. Add flomax. Irregular hear rhythm - no diagnosis of a fib. May not benefit from Eliquis any more? Holding.      Trazodone at night    DVT prophylaxis - SCDs       Signed By:    Ayesha Bang DO    June 16, 2022

## 2022-06-16 NOTE — PROGRESS NOTES
TRANSFER - OUT REPORT:    Verbal report given to Yasmani Rowe on [employer]  being transferred to 0 for routine progression of patient care       Report consisted of patient's Situation, Background, Assessment and   Recommendations(SBAR). Information from the following report(s) ED SBAR, Intake/Output and MAR was reviewed with the receiving nurse. Lines:   Peripheral IV 06/16/22 Left Antecubital (Active)       Peripheral IV 06/16/22 Distal;Right;Ventral Forearm (Active)        Opportunity for questions and clarification was provided.       Patient transported with:  [facility]

## 2022-06-16 NOTE — PROGRESS NOTES
completed initial visit with patient, as requested by patient. Patient expressed stress and difficulty with his health issues and changes to his lifestyle. Patient has supportive adult children.  provided pastoral presence, prayer and empathetic listening.   Signed by  Alphonza Phalen, M.Div. 1. Thank you for coming in today so that we can help you.  2. If you need to schedule a test:  A. That department should call you within two days to schedule. If you do not receive a phone call from that specific department, please call [facility] at: [phone] to schedule.  B. For tests to be scheduled more than one month in the future, that specific department should call you a few weeks before the time that the test is needed, to coordinate a date and time.  C. We will schedule your child's follow up appointment with us as Pediatric Urology, for the appropriate time frame. If your child needs a pre-visit Ultrasound, some of these studies we will coordinate in Ultrasound, some of these studies we will perform during our clinic visit in our exam room.  3. If you need to contact us as Pediatric Urology, or change an appointment with us, call us at [phone]. This is the  Patient  at [address] [place] [address].  4. Is your child signed up for [employer]? If you do this, you can message us rather than playing phone tag! You can also look at labs, pay your bills, and do some scheduling.   Go to your own (parent) account first. (If you don't have one yet, you can set one up at the website below or at your doctor's office).  Using a web browser (not the phone kimber), on the right hand side of your page, click the button marked \"Request Access to my Child's Records.\" Fill out the information. In a few days your child's information will be linked to your account. It's that simple.   Here is the website for more information:   [email or web address]  Or call: [phone].  A.This way you can communicate with us,   B. Communicate with your child's other providers,   C. Potentially upload photos/ documents/ images to you child's chart in the future.  D. Please note that this is not available to children aged 12 to 17 years old because of HIPAA concerns.  5. Any Financial  inquires or billing questions, call Patient Contact Center at: [phone].   6. Poison Control Center: [phone]      If you need to communicate with us please call us at our centralized number: [phone] to speak with our patient .     If the baby has:  3.  foul smelling urine,   4. poor feeding,   5. extremely fussy behavior,   6. saud red blood in the diaper, and/or,  7.  fever, seek medical attention emergently since these are concerning for urinary tract infection. Pack a bag as the baby may need to be admitted to the hospital.

## 2022-06-16 NOTE — PROGRESS NOTES
RNCM met with patient in room 203 to discuss discharge planning. Patient drowsy in bed. Alert to person, place. but [place]. Family at bedside answered assessment questions. Patient's daughter, Chad Ly lives with patient in split level home, patient lives on mail floor with walk in shower. Patient ambulates with cane and \"still drives\". Xavier Keiko confirmed demographics, PCP and uses [facility] or [facility] in [place] CM following for supportive needs.          ASSESSMENT NOTE    Attending Physician: Chanelle Rios,   Admit Problem: Urinary retention [R33.9]  Diverticulitis [K57.92]  Diverticulosis [K57.90]  Diverticulitis of colon [K57.32]  Weight loss [R63.4]  Hypotension, unspecified hypotension type [I95.9]  Sepsis, due to unspecified organism, unspecified whether acute organ dysfunction present [facility]) [A41.9]  Date/Time of Admission: 6/16/2022 10:38 AM  Problem List:  Patient Active Problem List   Diagnosis    Acquired contracture of bladder neck    Dizziness    YOUSSEF (dyspnea on exertion)    Irregular cardiac rhythm    Primary osteoarthritis of right knee    Acute [address]A (cerebrovascular accident) ([address]    Chronic right-sided headaches    Gout    Left leg paresthesias    Left-sided weakness    TIA (transient ischemic attack)    Slurred speech    Localized edema    Chronic low back pain    Peripheral neuropathy    MIKO (acute kidney injury) ([address]    Hypertension    Peptic ulcer disease    Mixed hyperlipidemia    Stage 3 chronic kidney disease ([address]    Aortic stenosis, moderate    Urinary retention    Hyperkalemia    Diverticulitis    Diverticulosis    Failure to thrive (child)       Service Assessment  Patient Orientation Person,Place   Cognition Alert   History Provided By Child/Family   Primary Caregiver Family   Accompanied By/Relationship patient's daughter: Chad Ly  [address] Members   Patient's Healthcare Decision Maker is: Legal Next of Kin PCP Verified by CM Yes (Dr Matias Vela  [phone])   Last Visit to PCP Within last 3 months   Prior Functional Level Assistance with the following:,Mobility   Current Functional Level     Can patient return to prior living arrangement Yes   Ability to make needs known: Good   Family able to assist with home care needs: Yes   Would you like for me to discuss the discharge plan with any other family members/significant others, and if so, who? Financial Resources     Community Resources     CM/SW Referral       Social/Functional History  Lives With Daughter Georgia Mayer   [phone])   Type of [address] to Live on Main level with bedroom/bathroom   Home Access Stairs to enter without rails   Entrance Stairs - Number of Steps 4   Entrance Stairs - Rails     Bathroom Shower/Tub Walk-in shower   Bathroom Toilet     Bathroom Equipment     Bathroom Accessibility     Home Equipment     Receives Help From Family   ADL Assistance Needs assistance   [employer]     Grooming     Feeding     Toileting     [address]     Meal Prep     Laundry     Vacuuming     Cleaning     Gardening     Yard Work     Driving     Shopping          Other (Comment)     [address] Paying/Finance Responsibility     [facility] Management     Other (Comment)     Ambuation Assistance     Transfer Assisstance     Active  Yes   Patient's  Info     Mode of Transportation Car   Education     Occupation Retired   Type of Occupation       Discharge Planning   Type of [address] Members   Current Services Prior To Admission [facility] (Sonja Sanderson)   Potential Assistance Needed N/A   MARISSA Sanderson   DME     DME Ordered?      Potential Assistance Purchasing Medications No   Meds-to-Beds: Does the patient want to have any new prescriptions delivered to bedside prior to discharge? Type of Home Care Services None   Patient expects to be discharged to: House   Follow Up Appointment: Best Day/Time     One/Two Story Residence: Split level   # of Interior Steps     Height of Each Step (in)     [employer] Available     History of Falls? Services At/After Discharge  Transition of Care Consult (CM Consult): Discharge Planning   Internal Home Health     Internal Hospice     Reason Outside Agency [address]     Partner SNF     Reason Why Partner SNF Not Chosen     Internal Comfort Care     Reason Outside [address] Discharge None    Resource Information Provided? No   Mode of Transport at Discharge [facility] Time of Discharge     Confirm Follow Up Transport Family     Condition of Participation: Discharge Planning  The plan for Transition of Care is related to the following treatment goals: return to baseline   The Patient and/or Patient Representative was provided with a Choice of Provider? Patient   Name of the Patient Representative who was provided with the Choice of Provider and agrees with the Discharge Plan? The Patient and/or Patient Representative Agree with the Discharge Plan? Yes   Freedom of Choice list was provided with basic dialogue that supports the individualized plan of care/goals, treatment preferences, and shares the quality data associated with the providers?  Yes     Documentation for Discharge Appeal  Discharge Appealed by     Date notified by [employer] of appeal request:     Time notified by [employer]IO of appeal request:     Detailed Notice of Discharge given to:     Date Notice of Discharge given:     Time Notice of Discharge given:     Date records sent to [employer]     Time records sent to [employer]     Date Notified of Outcome     Time Notified of Outcome     Outcome of appeal Danilo Mendoza RN 06/16/22 3:39 PM

## 2022-06-16 NOTE — ED PROVIDER NOTES
[employer] Emergency Department Provider Note                   PCP:                Naomy Gaitan MD               Age: 80 y.o. Sex: male       ICD-10-CM    1. Hypotension, unspecified hypotension type  I95.9    2. Weight loss  R63.4    3. Diverticulitis of colon  K57.32    4. Sepsis, due to unspecified organism, unspecified whether acute organ dysfunction present ([facility] [address]  A41.9    5. Urinary retention  R33.9        DISPOSITION         New Prescriptions    No medications on file       Orders Placed This Encounter   Procedures    Critical Care    Blood Culture 1    Blood Culture 2    XR CHEST PORTABLE    CT CHEST ABDOMEN PELVIS WO CONTRAST    Comprehensive Metabolic Panel    CBC with Auto Differential    Lactate, Sepsis    Procalcitonin    Urinalysis with Reflex to Culture    Troponin    Magnesium    Lipase    TSH    Protime-INR    Neurologic Status Assessment    Strict intake and output    Vital Signs Per Unit Routine    Insert corrigan catheter    EKG 12 Lead    TYPE AND SCREEN    Saline lock IV        Kiya Carroll MD 12:52 PM      MDM  Number of Diagnoses or Management Options  Diverticulitis of colon: new, needed workup  Hypotension, unspecified hypotension type: new, needed workup  Sepsis, due to unspecified organism, unspecified whether acute organ dysfunction present [facility]): new, needed workup  Urinary retention: new, needed workup  Weight loss: new, needed workup  Diagnosis management comments: Noted to be hypertensive on arrival.  Patient with soiled linen on EMS stretcher that was noted to be maroon in color. Guaiac positive. Pt is anticoagulated on Eliquis. WBC 14.9. LA 3.1. H&H stable at 11.9/37.9. Blood pressure improved after receiving 30 cc/kg IV fluid bolus. Covered with Zosyn 4.5 g IV. CT chest abdomen and pelvis with extensive colonic diverticulosis with mild acute diverticulitis involving the descending colon.  Severely distended urinary bladder with severe right hydroureteronephrosis, possibly resulting from the presumed chronic bladder outlet obstruction. No obstructing etiology identified at the [facility]. Bilateral renal atrophy with 3 mm nonobstructing right renal calculus. Mckeon catheter placed. Will consult hospitalist for admission. We will also touch base with urology in regards to patient.        Amount and/or Complexity of Data Reviewed  Clinical lab tests: ordered and reviewed  Tests in the radiology section of CPT®: ordered and reviewed  Tests in the medicine section of CPT®: ordered and reviewed  Review and summarize past medical records: yes  Discuss the patient with other providers: yes  Independent visualization of images, tracings, or specimens: yes    Risk of Complications, Morbidity, and/or Mortality  Presenting problems: high  Diagnostic procedures: high  Management options: high  General comments: Results Include:    Recent Results (from the past 24 hour(s))  -EKG 12 Lead:   Collection Time: 06/16/22 10:47 AM       Result                      Value             Ref Range           Ventricular Rate            94                BPM                 Atrial Rate                 94                BPM                 P-R Interval                159               ms                  QRS Duration                117               ms                  Q-T Interval                367               ms                  QTc Calculation (Bazet*     459               ms                  P Axis                      54                degrees             R Axis                      -77               degrees             T Axis                      56                degrees             Diagnosis                   Sinus rhythm                     -Comprehensive Metabolic Panel:   Collection Time: 06/16/22 10:56 AM       Result                      Value             Ref Range           Sodium                      138               138 - 145 mm*       Potassium                   4.9 3.5 - 5.1 mm*       Chloride                    112 (H)           98 - 107 mmo*       CO2                         15 (L)            21 - 32 mmol*       Anion Gap                   11                7 - 16 mmol/L       Glucose                     168 (H)           65 - 100 mg/*       BUN                         36 (H)            8 - 23 MG/DL        CREATININE                  2.20 (H)          0.8 - 1.5 MG*       GFR         36 (L)            >60 ml/min/1*       GFR Non- Americ*     30 (L)            >60 ml/min/1*       Calcium                     9.9               8.3 - 10.4 M*       Total Bilirubin             0.6               0.2 - 1.1 MG*       ALT                         15                12 - 65 U/L         AST                         17                15 - 37 U/L         Alk Phosphatase             136               50 - 136 U/L        Total Protein               6.1 (L)           6.3 - 8.2 g/*       Albumin                     3.1 (L)           3.2 - 4.6 g/*       Globulin                    3.0               2.3 - 3.5 g/*       Albumin/Globulin Ratio      1.0 (L)           1.2 - 3.5      -CBC with Auto Differential:   Collection Time: 06/16/22 10:56 AM       Result                      Value             Ref Range           WBC                         14.9 (H)          4.3 - 11.1 K*       RBC                         3.66 (L)          4.23 - 5.6 M*       Hemoglobin                  11.9 (L)          13.6 - 17.2 *       Hematocrit                  37.9 (L)          41.1 - 50.3 %       MCV                         103.6 (H)         79.6 - 97.8 *       MCH                         32.5              26.1 - 32.9 *       MCHC                        31.4              31.4 - 35.0 *       RDW                         15.9 (H)          11.9 - 14.6 %       Platelets                   183               150 - 450 K/*       MPV                         12.6 (H)          9.4 - 12.3 FL nRBC                        0.00              0.0 - 0.2 K/*       Differential Type           AUTOMATED                             Seg Neutrophils             84 (H)            43 - 78 %           Lymphocytes                 9 (L)             13 - 44 %           Monocytes                   4                 4.0 - 12.0 %        Eosinophils %               2                 0.5 - 7.8 %         Basophils                   0                 0.0 - 2.0 %         Immature Granulocytes       1                 0.0 - 5.0 %         Segs Absolute               12.7 (H)          1.7 - 8.2 K/*       Absolute Lymph #            1.3               0.5 - 4.6 K/*       Absolute Mono #             0.6               0.1 - 1.3 K/*       Absolute Eos #              0.2               0.0 - 0.8 K/*       Basophils Absolute          0.0               0.0 - 0.2 K/*       Absolute Immature Gran*     0.1               0.0 - 0.5 K/*  -Lactate, Sepsis:   Collection Time: 06/16/22 10:56 AM       Result                      Value             Ref Range           Lactic Acid, Sepsis         3.1 (H)           0.4 - 2.0 MM*  -Troponin:   Collection Time: 06/16/22 10:56 AM       Result                      Value             Ref Range           Troponin, High Sensiti*     12.0              0 - 14 pg/mL   -Magnesium:   Collection Time: 06/16/22 10:56 AM       Result                      Value             Ref Range           Magnesium                   2.0               1.8 - 2.4 mg*  -Lipase:   Collection Time: 06/16/22 10:56 AM       Result                      Value             Ref Range           Lipase                      92                73 - 393 U/L   -TSH:   Collection Time: 06/16/22 10:56 AM       Result                      Value             Ref Range           TSH, 3RD GENERATION         2.830             0.358 - 3.74*  -Protime-INR:   Collection Time: 06/16/22 10:57 AM       Result                      Value             Ref Range Protime                     16.0 (H)          12.6 - 14.5 *       INR                         1.2                              -Urinalysis with Reflex to Culture:   Collection Time: 06/16/22 12:18 PM  Specimen: Urine       Result                      Value             Ref Range           Color, UA                   YELLOW/STRAW                          Appearance                  CLEAR                                 Specific [place], UA        1.013             1.001 - 1.02*       pH, Urine                   5.0               5.0 - 9.0           Protein, UA                 Negative          NEG mg/dL           Glucose, UA                 Negative          mg/dL               Ketones, Urine              Negative          NEG mg/dL           Bilirubin Urine             Negative          NEG                 Blood, Urine                Negative          NEG                 Urobilinogen, Urine         0.2               0.2 - 1.0 EU*       Nitrite, Urine              Negative          NEG                 Leukocyte Esterase, Ur*     Negative          NEG                 Urine Culture if Indic*                                       CULTURE NOT INDICATED BY UA RESULT       WBC, UA                     0-4 (A)           NORM /hpf           RBC, UA                     0-5 (A)           NORM /hpf           BACTERIA, URINE             Negative (A)      NORM /hpf           Epithelial Cells UA         0-5 (A)           NORM /hpf           Casts                       0-2 (A)           NORM /lpf           Mucus, UA                   0                 0 /lpf             Patient Progress  Patient progress: other (comment) (Guarded )       Robert Funez is a 80 y.o. male who presents to the Emergency Department with chief complaint of generalized weakness, abdominal pain, diarrhea.        Chief Complaint   Patient presents with    Hypotension      66-year-old male with history of PUD, hypertension, hyperlipidemia, gout, CKD, prostate cancer status post radiation treatment, CVA, aortic stenosis presents with complaint of generalized weakness, fatigue, 40 pound weight loss over the past several weeks to months. Patient recently evaluated at [facility] ER. Patient declined CT chest abdomen pelvis at that time. Patient states that he followed up with his primary care doctor on Tuesday and had basic blood work done. States he also was recently seen by optometrist in regards to acute on chronic vision loss. EMS state the patient had significant dizziness upon standing was noted to have systolic of 98 while supine. Upon patient standing up his blood pressure was 70s over 40s. Per EMS, patient sold their stretcher with dark red loose stool. Patient states that he was having some lower abdominal pain earlier but denies any current abdominal discomfort. The history is provided by the patient and a relative. No  was used. Fatigue  Severity:  Severe  Onset quality:  Gradual  Duration:  4 days  Timing:  Constant  Chronicity:  New  Relieved by:  Nothing  Worsened by:  Nothing  Ineffective treatments:  None tried  Associated symptoms: abdominal pain and diarrhea    Associated symptoms: no chest pain, no cough, no dysuria, no fever, no myalgias, no nausea, no seizures, no shortness of breath and no vomiting          Review of Systems   Constitutional: Negative for chills, diaphoresis, fatigue and fever. HENT: Negative for congestion, rhinorrhea, sore throat, trouble swallowing and voice change. Eyes: Positive for visual disturbance. Respiratory: Negative for cough and shortness of breath. Cardiovascular: Negative for chest pain and palpitations. Gastrointestinal: Positive for abdominal pain, blood in stool and diarrhea. Negative for abdominal distention, nausea and vomiting. Genitourinary: Negative for dysuria, flank pain and hematuria.    Musculoskeletal: Negative for back pain, myalgias, neck pain and neck stiffness. Skin: Negative for rash and wound. Neurological: Positive for syncope. Negative for seizures, facial asymmetry, speech difficulty, weakness, light-headedness and numbness. Psychiatric/Behavioral: Negative for confusion. Past Medical History:   Diagnosis Date    Acute CVA (cerebrovascular accident) ([facility] [address]     Cancer of prostate ([facility] [address] 2001    radiation tx     CKD (chronic kidney disease) stage 3, GFR 30-59 ml/min ([facility] [address] 8/18/2012    Gout 2/15/8722    Metabolic syndrome 0/58/7870    Mixed hyperlipidemia 8/18/2012    Moderate aortic valve stenosis     Peptic ulcer disease 8/18/2012    Unspecified essential hypertension 8/18/2012    Urinary retention         Past Surgical History:   Procedure Laterality Date    APPENDECTOMY  1950    CHOLECYSTECTOMY      COLONOSCOPY  2002    GASTRECTOMY      V&A, 1999    TURP  2000, 2015    Dx CA, followed by radiation Rx        Family History   Problem Relation Age of Onset    Dementia Father            Social Connections:     Frequency of Communication with Friends and Family: Not on file    Frequency of Social Gatherings with Friends and Family: Not on file    Attends Sabianist Services: Not on file    Active Member of Clubs or Organizations: Not on file    Attends Club or Organization Meetings: Not on file    Marital Status: Not on file        Allergies   Allergen Reactions    Gabapentin Other (See Comments)     Dizzy. Pt reports that he's now taking the medicine at night before bed - no longer having issues and is continuing to take this med.  9/22/2016        Vitals signs and nursing note reviewed. Patient Vitals for the past 4 hrs:   Temp Pulse Resp BP SpO2   06/16/22 1120 -- 91 18 (!) 112/57 94 %   06/16/22 1100 -- 97 18 (!) 87/49 98 %   06/16/22 1044 98 °F (36.7 °C) 94 16 (!) 80/56 97 %          Physical Exam  Vitals and nursing note reviewed. Constitutional:       Appearance: He is ill-appearing.    HENT:      Head: Critical care provider statement:     Critical care time (minutes):  30    Critical care was necessary to treat or prevent imminent or life-threatening deterioration of the following conditions:  Sepsis, metabolic crisis, dehydration and circulatory failure    Critical care was time spent personally by me on the following activities:  Blood draw for specimens, development of treatment plan with patient or surrogate, discussions with consultants, evaluation of patient's response to treatment, examination of patient, obtaining history from patient or surrogate, ordering and performing treatments and interventions, ordering and review of radiographic studies, ordering and review of laboratory studies, pulse oximetry, re-evaluation of patient's condition and review of old charts    I assumed direction of critical care for this patient from another provider in my specialty: yes    Comments:      Patient with sepsis requiring multiple IV fluid bolus and IV antibiotics.   Will consult hospitalist for admission          Labs Reviewed   COMPREHENSIVE METABOLIC PANEL - Abnormal; Notable for the following components:       Result Value    Chloride 112 (*)     CO2 15 (*)     Glucose 168 (*)     BUN 36 (*)     CREATININE 2.20 (*)     GFR  36 (*)     GFR Non- 30 (*)     Total Protein 6.1 (*)     Albumin 3.1 (*)     Albumin/Globulin Ratio 1.0 (*)     All other components within normal limits   CBC WITH AUTO DIFFERENTIAL - Abnormal; Notable for the following components:    WBC 14.9 (*)     RBC 3.66 (*)     Hemoglobin 11.9 (*)     Hematocrit 37.9 (*)     .6 (*)     RDW 15.9 (*)     MPV 12.6 (*)     Seg Neutrophils 84 (*)     Lymphocytes 9 (*)     Segs Absolute 12.7 (*)     All other components within normal limits   LACTATE, SEPSIS - Abnormal; Notable for the following components:    Lactic Acid, Sepsis 3.1 (*)     All other components within normal limits   URINALYSIS WITH REFLEX TO CULTURE - Abnormal; Notable for the following components:    WBC, UA 0-4 (*)     RBC, UA 0-5 (*)     BACTERIA, URINE Negative (*)     Epithelial Cells UA 0-5 (*)     Casts 0-2 (*)     All other components within normal limits   PROTIME-INR - Abnormal; Notable for the following components:    Protime 16.0 (*)     All other components within normal limits   CULTURE, BLOOD 1   CULTURE, BLOOD 1   PROCALCITONIN   TROPONIN   MAGNESIUM   LIPASE   TSH   LACTATE, SEPSIS   TYPE AND SCREEN        CT CHEST ABDOMEN PELVIS WO CONTRAST   Final Result      1. No evidence of malignancy in the chest, abdomen, or pelvis by noncontrast CT. 2. Extensive colonic diverticulosis with mild acute diverticulitis involving the   descending colon. 3. Severely distended urinary bladder with severe right hydroureteronephrosis,   possibly resulting from the presumed chronic bladder outlet obstruction. No   obstructing etiology identified at the [facility]. 4. Bilateral renal atrophy with 3 mm nonobstructing right renal calculus. XR CHEST PORTABLE   Final Result      1. Shallow inspiratory changes. CPT code(s) 18343                                   ED Course as of 06/16/22 1252   Thu Jun 16, 2022   1139 WBC(!): 14.9 [DF]   1139 CXR Findings:  Frontal view of the chest was obtained.      Shallow inspiratory changes are seen. No pleural effusions are demonstrated. The cardiomediastinal silhouette is within normal limits. There are no acute  osseous abnormalities.     IMPRESSION:     1. Shallow inspiratory changes. [DF]   1212 Creatinine(!): 2.20 [DF]   1212 CO2(!): 15 [DF]   1212 Chloride(!): 112 [DF]   1212 CT chest abdomen and pelvis without IV contrast   IMPRESSION:     1. No evidence of malignancy in the chest, abdomen, or pelvis by noncontrast CT.     2. Extensive colonic diverticulosis with mild acute diverticulitis involving the  descending colon.   3. Severely distended urinary bladder with severe right hydroureteronephrosis,  possibly resulting from the presumed chronic bladder outlet obstruction. No  obstructing etiology identified at the UV[facility]. 4. Bilateral renal atrophy with 3 mm nonobstructing right renal calculus.    [DF]   1248 Lactic Acid, Sepsis(!): 3.1 [DF]   1248 Bacteria, UA(!): Negative [DF]   1248 Hemoglobin Quant(!): 11.9 [DF]   1248 Hematocrit(!): 37.9 [DF]      ED Course User Index  [DF] Annie Titus MD        Voice dictation software was used during the making of this note. This software is not perfect and grammatical and other typographical errors may be present. This note has not been completely proofread for errors.      Annie Titus MD  06/16/22 6215

## 2022-06-16 NOTE — ED TRIAGE NOTES
Pt arrives via [facility] from home, called out initally for stroke. Pt has been weak for the past 2 mos, lost 40lbs, pt also has photophobia and blurry vision for the past few weeks as well. Pt was seen by optometry for this and they did not find anything wrong. Pt has dizziness upon standing, BP with EMS initially 98sys while supine, sat up and BP 70/40. Per EMS, pt soiled their stretcher and it was dark red, this is the first episode of this that we know of. Pt states he was having lower abd pain earlier, across his entire abdomen, pt also states he has shob. Pt denies chest pain, denies n/v/d. Pt was given about 400cc of NS by EMS.

## 2022-06-16 NOTE — PROGRESS NOTES
TRANSFER - IN REPORT:    Verbal report received from [facility] on [employer]  being received from ED for routine progression of patient care      Report consisted of patient's Situation, Background, Assessment and   Recommendations(SBAR). Information from the following report(s) Nurse Handoff Report, Index, ED Encounter Summary, ED SBAR, Adult Overview, Intake/Output, Recent Results, Cardiac Rhythm sinus rhythm, Quality Measures and Neuro Assessment was reviewed with the receiving nurse. Opportunity for questions and clarification was provided. Assessment completed upon patient's arrival to unit and care assumed.

## 2022-06-16 NOTE — ACP (ADVANCE CARE PLANNING)
[facility]  At the heart of better care     Advance Care Planning   Admit Date:  2022 10:38 AM   Name:  Yumi Leigh   Age:  80 y.o. Sex:  male  :  1932   MRN:  [id number]   Room:  [address] is able to make his own decisions:   Yes      Patient / surrogate decision-maker directed:  DNR/DNI    Patient or surrogate consented to discussion of the current conditions, workup, management plans, prognosis, and understand the risk for further deterioration. Time spent: 17 minutes in direct discussion (face to face and/or over phone).       Signed:  Ayesha Bang DO

## 2022-06-16 NOTE — PROGRESS NOTES
END OF SHIFT NOTE:    INTAKE/OUTPUT  No intake/output data recorded. Voiding: Yes  Catheter: Yes  Drain:   Urinary Catheter Mckeon (Active)   $ Urethral catheter insertion $ Not inserted for procedure 06/16/22 1336   Catheter Indications Urinary retention (acute or chronic), continuous bladder irrigation or bladder outlet obstruction; Need for fluid volume management of the critically ill patient in a critical care setting 06/16/22 1420   Site Assessment Pink 06/16/22 1420   Urine Color Bloody; Verna 06/16/22 1420   Urine Appearance Clear 06/16/22 1420   Collection Container Standard 06/16/22 1420   Securement Method Securing device (Describe) 06/16/22 1420   Catheter Care Completed Yes 06/16/22 1420   Catheter Best Practices  Drainage tube clipped to bed; Tamper seal intact; Catheter secured to thigh; Bag below bladder;Bag not on floor;Drainage bag less than half full;Lack of dependent loop in tubing 06/16/22 1420   Status Draining 06/16/22 1420   Discontinuation Reason Per nurse-driven protocol 51/15/42 1336               Flatus: Patient does have flatus present. Stool: 3 occurrences. Characteristics:  Stool Appearance: Loose  Stool Color: Brown,Red streaks  Stool Amount: Medium  Stool Assessment  Incontinence: Yes  Stool Appearance: Loose  Stool Color: Brown,Red streaks  Stool Amount: Medium  Stool Source: Rectum  Last BM (including prior to admit): 06/16/22    Emesis:  occurrences.     Characteristics:        VITAL SIGNS  Patient Vitals for the past 12 hrs:   Temp Pulse Resp BP SpO2   06/16/22 1420 -- 90 18 -- --   06/16/22 1330 97.7 °F (36.5 °C) 87 17 (!) 104/53 --   06/16/22 1300 -- 86 21 (!) 107/54 93 %   06/16/22 1245 -- 89 17 (!) 103/54 98 %   06/16/22 1230 -- 88 15 112/65 99 %   06/16/22 1215 -- 82 18 (!) 78/60 98 %   06/16/22 1200 -- 85 18 (!) 99/52 97 %   06/16/22 1120 -- 91 18 (!) 112/57 94 %   06/16/22 1100 -- 97 18 (!) 87/49 98 %   06/16/22 1044 98 °F (36.7 °C) 94 16 (!) 80/56 97 %       Pain Assessment  @[email or web address]@     Patient's Stated Pain Goal: 0 - No pain    Ambulating  Yes     Shift report given to oncoming nurse at the bedside.     Jaleesa Rivera RN

## 2022-06-16 NOTE — PROGRESS NOTES
PHYSICAL THERAPY Initial Assessment  (Link to Caseload Tracking: PT Visit Days : 1  Acknowledge Orders  Time In/Out  PT Charge Capture  Rehab Caseload Tracker    Toni Hough is a 80 y.o. male   PRIMARY DIAGNOSIS: Diverticulitis  Urinary retention [R33.9]  Diverticulitis [K57.92]  Diverticulosis [K57.90]  Diverticulitis of colon [K57.32]  Weight loss [R63.4]  Hypotension, unspecified hypotension type [I95.9]  Sepsis, due to unspecified organism, unspecified whether acute organ dysfunction present ([facility] [address] [A41.9]     Reason for Referral: Generalized Muscle Weakness (M62.81)  Other abnormalities of gait and mobility (R26.89)  Inpatient: Payor: Mary Khan / Plan: [facility] / Product Type: *No Product type* /     ASSESSMENT:     REHAB RECOMMENDATIONS:   Recommendation to date pending progress:  Setting:   No further skilled therapy after discharge from hospital    Equipment:     To Be Determined     ASSESSMENT:  Mr. Chelsea Arango is an 80 y.o. male who presented with hypotension, abdominal pain, and diarrhea with 40 lb weight loss over 2 weeks. He denied reports of pain upon PT arrival. Prior to admission, he was modified independent with use of SPC \"most of the time\" for mobility. Today, he performed bed mobility, sit to stand transfer, and standing balance with supervision and eval was limited due to frequency of bowel movements. Based on patient's initial assessment, I think he could return home with his daughter with DME TBD once more assessment takes place in PT. He could benefit from PT services during his hospital stay.      [address] [facility] [address] [id number] AM-PAC 6 Clicks Basic Mobility Inpatient Short Form  AM-PAC Mobility Inpatient   How much difficulty turning over in bed?: None  How much difficulty sitting down on / standing up from a chair with arms?: None  How much difficulty moving from lying on back to sitting on side of bed?: None  How much help from another person moving to and from a bed to a chair?: A Little  How much help from another person needed to walk in hospital room?: A Little  How much help from another person for climbing 3-5 steps with a railing?: A Lot  AM-PAC Inpatient Mobility Raw Score : 20  AM-PAC Inpatient T-Scale Score : 47.67  Mobility Inpatient CMS 0-100% Score: 35.83  Mobility Inpatient CMS G-Code Modifier : CJ    SUBJECTIVE:   Mr. Barnes Urdu states, \"My vision is messed up. \"     Social/Functional Lives With: Daughter  Type of Home: House  Home Layout: Able to Live on Main level with bedroom/bathroom  Home Access: Stairs to enter without rails  Entrance Stairs - Number of Steps: 4    OBJECTIVE:     PAIN: Caterina Kand / O2: Darby Quiñonez / Henri Childers / Quinn Goodwin:   Pre Treatment:   Pain Assessment: None - Denies Pain  Pain Level: 0  Post Treatment: 0 Vitals        Oxygen  O2 Therapy: Room air   Mckeon Catheter    RESTRICTIONS/PRECAUTIONS:                    GROSS EVALUATION: Intact Impaired (Comments):   AROM [x]     PROM [x]    Strength [] Generally decreased   Balance []  Shaky knees/LE during standing    Posture []    Sensation []  NT   Coordination []  NT   Tone []  NT   Edema [x]    Activity Tolerance [] Patient limited by fatigue    []      COGNITION/  PERCEPTION: Intact Impaired (Comments):   Orientation [x]     Vision [] Patient reports poor vision   Hearing []  Appears [place]   Cognition  [x]       MOBILITY: I Mod I S SBA CGA Min Mod Max Total  NT x2 Comments:   Bed Mobility    Rolling [] [] [x] [] [] [] [] [] [] [] []    Supine to Sit [] [] [x] [] [] [] [] [] [] [] []    Scooting [] [] [x] [] [] [] [] [] [] [] []    Sit to Supine [] [] [x] [] [] [] [] [] [] [] []    Transfers    Sit to Stand [] [] [x] [] [] [] [] [] [] [] []    Bed to Chair [] [] [] [] [] [] [] [] [] [x] []    Stand to Sit [] [] [x] [] [] [] [] [] [] [] []     [] [] [] [] [] [] [] [] [] [] []    I=Independent, Mod I=Modified Independent, S=Supervision, SBA=Standby Assistance, CGA=Contact Guard Assistance,   Min=Minimal Assistance, Mod=Moderate Assistance, Max=Maximal Assistance, Total=Total Assistance, NT=Not Tested    GAIT: I Mod I S SBA CGA Min Mod Max Total  NT x2 Comments:   Level of Assistance [] [] [] [] [] [] [] [] [] [x] []    Distance 0 feet    DME N/A    Gait Quality N/A    Weightbearing Status WBAT    Stairs Stairs/Curb  Stairs?: No    I=Independent, Mod I=Modified Independent, S=Supervision, SBA=Standby Assistance, CGA=Contact Guard Assistance,   Min=Minimal Assistance, Mod=Moderate Assistance, Max=Maximal Assistance, Total=Total Assistance, NT=Not Tested    PLAN:   ACUTE PHYSICAL THERAPY GOALS:   (Developed with and agreed upon by patient and/or caregiver.)  (1.) Jelena Llamas will perform bed mobility with INDEPENDENCE within 5 treatment day(s). (2.) Jelena Llamas will transfer from bed to chair and chair to bed with INDEPENDENCE using the least restrictive device within 5 treatment day(s). (3.) Jelena Llamas will ambulate with INDEPENDENCE for 250 feet with the least restrictive device within 5 treatment day(s). (4.) Jelena Llamas will ascend and descend 4 stairs using either hand rail(s) with MODIFIED INDEPENDENCE to improve functional mobility and safety within 5 treatment day(s). (5.) Jelena Llamas will perform bilateral lower extremity exercises x 15 min for HEP with SUPERVISION to improve strength, endurance, and functional mobility within 5 treatment day(s).     FREQUENCY AND DURATION: 3 times/week for duration of hospital stay or until stated goals are met, whichever comes first.    THERAPY PROGNOSIS: Good    PROBLEM LIST:   (Skilled intervention is medically necessary to address:)  Decreased ADL/Functional Activities  Decreased Activity Tolerance  Decreased Balance  Decreased Gait Ability  Decreased Strength  Decreased Transfer Abilities INTERVENTIONS PLANNED:   (Benefits and precautions of physical therapy have been discussed with the patient.)  Therapeutic Activity  Therapeutic Exercise/HEP  Neuromuscular Re-education  Gait Training  Manual Therapy  Modalities  Education       TREATMENT:   EVALUATION: LOW COMPLEXITY: (Untimed Charge)    TREATMENT:   N/A    TREATMENT GRID:  N/A    AFTER TREATMENT PRECAUTIONS: Bed, Call light within reach, Needs within reach, RN at bedside and Side rails x2    INTERDISCIPLINARY COLLABORATION:  RN/ PCT and PT/ PTA    EDUCATION: Education Given To: Patient  Education Provided: Role of Therapy  Education Method: Verbal  Barriers to Learning: None  Education Outcome: Verbalized understanding    TIME IN/OUT:  Time In: 1537  Time Out: [facility] [facility]  Minutes: [address], PT

## 2022-06-16 NOTE — PLAN OF CARE
Problem: Discharge Planning  Goal: Discharge to home or other facility with appropriate resources  Outcome: Progressing  Flowsheets  Taken 6/16/2022 1710  Discharge to home or other facility with appropriate resources: Identify barriers to discharge with patient and caregiver  Taken 6/16/2022 1420  Discharge to home or other facility with appropriate resources: Identify barriers to discharge with patient and caregiver     Problem: Skin/Tissue Integrity  Goal: Absence of new skin breakdown  Description: 1. Monitor for areas of redness and/or skin breakdown  2. Assess vascular access sites hourly  3. Every 4-6 hours minimum:  Change oxygen saturation probe site  4. Every 4-6 hours:  If on nasal continuous positive airway pressure, respiratory therapy assess nares and determine need for appliance change or resting period.   Outcome: Progressing     Problem: Safety - Adult  Goal: Free from fall injury  Outcome: Progressing     Problem: ABCDS Injury Assessment  Goal: Absence of physical injury  Outcome: Progressing

## 2022-06-17 PROBLEM — Z92.3 S/P RADIATION THERAPY: Status: ACTIVE | Noted: 2022-01-01

## 2022-06-17 PROBLEM — D64.9 ANEMIA: Status: ACTIVE | Noted: 2022-01-01

## 2022-06-17 PROBLEM — E87.20 METABOLIC ACIDOSIS: Status: ACTIVE | Noted: 2022-01-01

## 2022-06-17 PROBLEM — K92.2 GI BLEED: Status: ACTIVE | Noted: 2022-01-01

## 2022-06-17 PROBLEM — N18.9 ACUTE KIDNEY INJURY SUPERIMPOSED ON CHRONIC KIDNEY DISEASE (HCC): Status: ACTIVE | Noted: 2020-01-31

## 2022-06-17 PROBLEM — K57.93 GASTROINTESTINAL HEMORRHAGE ASSOCIATED WITH INTESTINAL DIVERTICULITIS: Status: ACTIVE | Noted: 2022-01-01

## 2022-06-17 PROBLEM — N17.9 ACUTE KIDNEY INJURY SUPERIMPOSED ON CHRONIC KIDNEY DISEASE (HCC): Status: ACTIVE | Noted: 2020-01-31

## 2022-06-17 PROBLEM — Z97.8 FOLEY CATHETER IN PLACE: Status: ACTIVE | Noted: 2022-01-01

## 2022-06-17 PROBLEM — C61 PROSTATE CANCER (HCC): Status: ACTIVE | Noted: 2022-01-01

## 2022-06-17 PROBLEM — N20.0 RENAL CALCULUS, RIGHT: Status: ACTIVE | Noted: 2022-01-01

## 2022-06-17 PROBLEM — Z66 DNR (DO NOT RESUSCITATE): Status: ACTIVE | Noted: 2022-01-01

## 2022-06-17 PROBLEM — N13.30 HYDROURETERONEPHROSIS: Status: ACTIVE | Noted: 2022-01-01

## 2022-06-17 PROBLEM — R62.7 FAILURE TO THRIVE IN ADULT: Status: ACTIVE | Noted: 2022-01-01

## 2022-06-17 NOTE — PLAN OF CARE
Problem: Discharge Planning  Goal: Discharge to home or other facility with appropriate resources  6/17/2022 1140 by Shahida Shay RN  Outcome: Progressing  6/17/2022 0059 by Ishan Fletcher RN  Outcome: Progressing     Problem: Skin/Tissue Integrity  Goal: Absence of new skin breakdown  Description: 1. Monitor for areas of redness and/or skin breakdown  2. Assess vascular access sites hourly  3. Every 4-6 hours minimum:  Change oxygen saturation probe site  4. Every 4-6 hours:  If on nasal continuous positive airway pressure, respiratory therapy assess nares and determine need for appliance change or resting period.   6/17/2022 1140 by Shahida Shay RN  Outcome: Progressing  6/17/2022 0059 by Ishan Fletcher RN  Outcome: Progressing     Problem: Safety - Adult  Goal: Free from fall injury  6/17/2022 1140 by Shahida Shay RN  Outcome: Progressing  Flowsheets (Taken 6/17/2022 0720)  Free From Fall Injury: Instruct family/caregiver on patient safety  6/17/2022 0059 by Ishan Fletcher RN  Outcome: Progressing  Flowsheets (Taken 6/16/2022 1940)  Free From Fall Injury: Instruct family/caregiver on patient safety     Problem: ABCDS Injury Assessment  Goal: Absence of physical injury  6/17/2022 1140 by Shahida Shay RN  Outcome: Progressing  6/17/2022 0059 by Ishan Fletcher RN  Outcome: Progressing  Flowsheets (Taken 6/16/2022 1940)  Absence of Physical Injury: Implement safety measures based on patient assessment

## 2022-06-17 NOTE — PLAN OF CARE
Problem: Discharge Planning  Goal: Discharge to home or other facility with appropriate resources  6/17/2022 0059 by Jacky Bautista RN  Outcome: Progressing  6/16/2022 1915 by Kamryn Almaraz RN  Outcome: Progressing  Flowsheets  Taken 6/16/2022 1710  Discharge to home or other facility with appropriate resources: Identify barriers to discharge with patient and caregiver  Taken 6/16/2022 1420  Discharge to home or other facility with appropriate resources: Identify barriers to discharge with patient and caregiver     Problem: Skin/Tissue Integrity  Goal: Absence of new skin breakdown  Description: 1. Monitor for areas of redness and/or skin breakdown  2. Assess vascular access sites hourly  3. Every 4-6 hours minimum:  Change oxygen saturation probe site  4. Every 4-6 hours:  If on nasal continuous positive airway pressure, respiratory therapy assess nares and determine need for appliance change or resting period.   6/17/2022 0059 by Jacky Bautista RN  Outcome: Progressing  6/16/2022 1915 by Kamryn Almaraz RN  Outcome: Progressing     Problem: Safety - Adult  Goal: Free from fall injury  6/17/2022 0059 by Jacky Bautista RN  Outcome: Progressing  Flowsheets (Taken 6/16/2022 1940)  Free From Fall Injury: Instruct family/caregiver on patient safety  6/16/2022 1915 by Kamryn Almaraz RN  Outcome: Progressing     Problem: ABCDS Injury Assessment  Goal: Absence of physical injury  6/17/2022 0059 by Jacky Bautista RN  Outcome: Progressing  Flowsheets (Taken 6/16/2022 1940)  Absence of Physical Injury: Implement safety measures based on patient assessment  6/16/2022 1915 by Kamryn Almaraz RN  Outcome: Progressing

## 2022-06-17 NOTE — PROGRESS NOTES
Hospitalist Progress Note   Admit Date:  2022 10:38 AM   Name:  Jolie Polk   Age:  80 y.o. Sex:  male  :  1932   MRN:  [id number]   Room:      Presenting Complaint: Hypotension     Reason(s) for Admission: Urinary retention [R33.9]  Diverticulitis [K57.92]  Diverticulosis [K57.90]  Diverticulitis of colon [K57.32]  Weight loss [R63.4]  Hypotension, unspecified hypotension type [I95.9]  Sepsis, due to unspecified organism, unspecified whether acute organ dysfunction present [facility]) [A41.9]     Hospital Course & Interval History:   Please refer to the admission H&P for details of presentation. In summary, Jolie Polk is a 80 y.o. male with medical history significant for prostate cancer s/p radiation, CVA, CKD stage 3, HLD, known urinary retention but does not wear a Mckeon at home, aortic valve stenosis, and irregular heart rhythm on low dose Eliquis who presented with cc generalized weakness and 40 pound weight loss for the past two months. Hypotensive with dark red blood per rectum on admission. Stool occult positive. CT C/A/P showed no evidence of malignancy but with extensive colonic diverticulosis with mild acute diverticulitis involving the descending colon, severely distended urinary bladder with right hydroureteronephrosis, b/l renal atrophy with 3mm non obstructing right renal stone. Subjective/24 hr Events (22) : Patient is seen and examined at bedside. No acute events reported overnight by nursing staff. Continues to have multiple episode of diarrhea since admission. Denies n/v, abdominal pain. Able to tolerate PO diet without any issues. Reports having vision issues which he has been dealing with for past several days. Patient denies fever, chills, chest pains, shortness of breath. Review of Systems: 10 point review of systems is otherwise negative with the exception of the elements mentioned above.     Assessment & Plan:     Diverticulitis with GI hemorrhage  CT A/P reviewed. 06/17/22: Continues to have diarrhea since admission.  -Continue with Zosyn (6/16 - )  -Followup BCx    Acute blood loss anemia due to GI bleed from diverticulitis  Hemoglobin of 11.9 on admission with Hb of 12.6 on 6/12.    06/17/22: Hemoglobin down trended to 10.3. Stool occult +ve  - monitor Hb  - Transfuse to maintain Hb > 7       MIKO on CKD3 due to urinary retention from obstructive uropathy  Rght hydroureteronephrosis with 3mm non obstructing right renal stone  Prostate Ca s/p radiation   S/p Corrigan Catheter insertion  Known urinary retention without corrigna at home.   - likely will need to go with corrigan catheter and outpatient urology follow up  - start flomax   - will continue maintenance fluids     Failure to thrive in adult  Weight loss X 2 months. CT C/A/P without any signs of malignancy  - nutrition consulted    Irregular cardiac rhythm  EKG reviewed. Currently on sinus.  - hold eliquis give bleeding.  - will keep him on telemetry    Metabolic acidosis likely due to MIKO  Bicarb of 15 on admission  - continue on sodium bicarb tablets  - monitor BMP    Hypertension // HLD   - resume home lipitor  - hold home benazapril and lasix due to MIKO    Dispo: Home when medically stable    Diet:  ADULT DIET; Regular; Low Fat/Low Chol/High Fiber/2 gm Na  ADULT ORAL NUTRITION SUPPLEMENT; AM Snack, PM Snack, HS Snack; Standard High Calorie/High Protein Oral Supplement  DVT PPx: SDCs  Code status: DNR        I have reviewed and ordered clinical lab tests and independently visualized images, tracing. I spent 36 minutes of time caring for this patient at bedside or nearby, more than 50 percent of which was spent on coordination of care and/or counseling regarding the disease process, treatment options, and treatment plan.       Hospital Problems:  Principal Problem:    Diverticulitis  Active Problems:    Diverticulosis    Failure to thrive in adult    Hydroureteronephrosis    Anemia Gastrointestinal hemorrhage associated with intestinal diverticulitis    Renal calculus, right    Prostate cancer (HCC)    S/P radiation therapy    Metabolic acidosis    Mckeon catheter in place    DNR (do not resuscitate)    Irregular cardiac rhythm    Acute kidney injury superimposed on chronic kidney disease ([facility] [address]    Hypertension    Mixed hyperlipidemia    Stage 3 chronic kidney disease ([facility] [address]    Aortic stenosis, moderate    Urinary retention  Resolved Problems:    * No resolved hospital problems. *      Objective:     Patient Vitals for the past 24 hrs:   Temp Pulse Resp BP SpO2   06/17/22 1050 98.2 °F (36.8 °C) 72 18 (!) 121/57 98 %   06/17/22 0740 98.1 °F (36.7 °C) 73 17 125/60 96 %   06/17/22 0309 98.6 °F (37 °C) 70 20 (!) 105/54 --   06/17/22 0021 98.6 °F (37 °C) 71 20 108/63 97 %   06/16/22 1940 97.7 °F (36.5 °C) 72 20 (!) 104/56 98 %   06/16/22 1420 -- 90 18 -- --   06/16/22 1330 97.7 °F (36.5 °C) 87 17 (!) 104/53 --   06/16/22 1300 -- 86 21 (!) 107/54 93 %   06/16/22 1245 -- 89 17 (!) 103/54 98 %   06/16/22 1230 -- 88 15 112/65 99 %   06/16/22 1215 -- 82 18 (!) 78/60 98 %   06/16/22 1200 -- 85 18 (!) 99/52 97 %   06/16/22 1120 -- 91 18 (!) 112/57 94 %   06/16/22 1100 -- 97 18 (!) 87/49 98 %       Oxygen Therapy  SpO2: 98 %  O2 Device: None (Room air)    Estimated body mass index is 30.7 kg/m² as calculated from the following:    Height as of this encounter: 5' 7\" (1.702 m). Weight as of this encounter: 196 lb (88.9 kg). Intake/Output Summary (Last 24 hours) at 6/17/2022 1055  Last data filed at 6/17/2022 1008  Gross per 24 hour   Intake 2040 ml   Output 1550 ml   Net 490 ml         Physical Exam:     Blood pressure (!) 121/57, pulse 72, temperature 98.2 °F (36.8 °C), temperature source Oral, resp. rate 18, height 5' 7\" (1.702 m), weight 196 lb (88.9 kg), SpO2 98 %. General:    Well nourished.   Alert and awake, ill appearing  Head:  Normocephalic, atraumatic  Eyes:  Sclerae appear normal.  Pupils equally round. ENT:  Nares appear normal, no drainage. Moist oral mucosa  Neck:  No restricted ROM. Trachea midline   CV:   RRR. No jugular venous distension. Lungs:   CTAB. No wheezing  Respirations even, unlabored  Abdomen: Bowel sounds present. Soft, nontender, nondistended. Extremities: No cyanosis or clubbing. No edema  Skin:     No rashes and normal coloration. Warm and dry. Neuro:  CN II-XII grossly intact. A&Ox3  Psych:  Normal mood and affect.       I have reviewed ordered lab tests and independently visualized imaging below:    Recent Labs:  Recent Results (from the past 48 hour(s))   EKG 12 Lead    Collection Time: 06/16/22 10:47 AM   Result Value Ref Range    Ventricular Rate 94 BPM    Atrial Rate 94 BPM    P-R Interval 159 ms    QRS Duration 117 ms    Q-T Interval 367 ms    QTc Calculation (Bazett) 459 ms    P Axis 54 degrees    R Axis -77 degrees    T Axis 56 degrees    Diagnosis Sinus rhythm    TYPE AND SCREEN    Collection Time: 06/16/22 10:51 AM   Result Value Ref Range    Crossmatch expiration date 06/19/2022,2359     ABO/Rh O POSITIVE     Antibody Screen NEG    Comprehensive Metabolic Panel    Collection Time: 06/16/22 10:56 AM   Result Value Ref Range    Sodium 138 138 - 145 mmol/L    Potassium 4.9 3.5 - 5.1 mmol/L    Chloride 112 (H) 98 - 107 mmol/L    CO2 15 (L) 21 - 32 mmol/L    Anion Gap 11 7 - 16 mmol/L    Glucose 168 (H) 65 - 100 mg/dL    BUN 36 (H) 8 - 23 MG/DL    CREATININE 2.20 (H) 0.8 - 1.5 MG/DL    GFR  36 (L) >60 ml/min/1.73m2    GFR Non- 30 (L) >60 ml/min/1.73m2    Calcium 9.9 8.3 - 10.4 MG/DL    Total Bilirubin 0.6 0.2 - 1.1 MG/DL    ALT 15 12 - 65 U/L    AST 17 15 - 37 U/L    Alk Phosphatase 136 50 - 136 U/L    Total Protein 6.1 (L) 6.3 - 8.2 g/dL    Albumin 3.1 (L) 3.2 - 4.6 g/dL    Globulin 3.0 2.3 - 3.5 g/dL    Albumin/Globulin Ratio 1.0 (L) 1.2 - 3.5     CBC with Auto Differential    Collection Time: 06/16/22 10:56 AM   Result Value Ref Range    WBC 14.9 (H) 4.3 - 11.1 K/uL    RBC 3.66 (L) 4.23 - 5.6 M/uL    Hemoglobin 11.9 (L) 13.6 - 17.2 g/dL    Hematocrit 37.9 (L) 41.1 - 50.3 %    .6 (H) 79.6 - 97.8 FL    MCH 32.5 26.1 - 32.9 PG    MCHC 31.4 31.4 - 35.0 g/dL    RDW 15.9 (H) 11.9 - 14.6 %    Platelets 562 591 - 137 K/uL    MPV 12.6 (H) 9.4 - 12.3 FL    nRBC 0.00 0.0 - 0.2 K/uL    Differential Type AUTOMATED      Seg Neutrophils 84 (H) 43 - 78 %    Lymphocytes 9 (L) 13 - 44 %    Monocytes 4 4.0 - 12.0 %    Eosinophils % 2 0.5 - 7.8 %    Basophils 0 0.0 - 2.0 %    Immature Granulocytes 1 0.0 - 5.0 %    Segs Absolute 12.7 (H) 1.7 - 8.2 K/UL    Absolute Lymph # 1.3 0.5 - 4.6 K/UL    Absolute Mono # 0.6 0.1 - 1.3 K/UL    Absolute Eos # 0.2 0.0 - 0.8 K/UL    Basophils Absolute 0.0 0.0 - 0.2 K/UL    Absolute Immature Granulocyte 0.1 0.0 - 0.5 K/UL   Lactate, Sepsis    Collection Time: 06/16/22 10:56 AM   Result Value Ref Range    Lactic Acid, Sepsis 3.1 (H) 0.4 - 2.0 MMOL/L   Procalcitonin    Collection Time: 06/16/22 10:56 AM   Result Value Ref Range    Procalcitonin <0.05 0.00 - 0.49 ng/mL   Troponin    Collection Time: 06/16/22 10:56 AM   Result Value Ref Range    Troponin, High Sensitivity 12.0 0 - 14 pg/mL   Magnesium    Collection Time: 06/16/22 10:56 AM   Result Value Ref Range    Magnesium 2.0 1.8 - 2.4 mg/dL   Lipase    Collection Time: 06/16/22 10:56 AM   Result Value Ref Range    Lipase 92 73 - 393 U/L   TSH    Collection Time: 06/16/22 10:56 AM   Result Value Ref Range    TSH, 3RD GENERATION 2.830 0.358 - 3.740 uIU/mL   Protime-INR    Collection Time: 06/16/22 10:57 AM   Result Value Ref Range    Protime 16.0 (H) 12.6 - 14.5 sec    INR 1.2     Blood Culture 1    Collection Time: 06/16/22 11:21 AM    Specimen: Blood   Result Value Ref Range    Special Requests RIGHT  Antecubital        Culture NO GROWTH AFTER 19 HOURS     Blood Culture 2    Collection Time: 06/16/22 11:21 AM    Specimen: Blood   Result Value Ref Range Special Requests RIGHT  HAND        Culture NO GROWTH AFTER 19 HOURS     Urinalysis with Reflex to Culture    Collection Time: 06/16/22 12:18 PM    Specimen: Urine   Result Value Ref Range    Color, UA YELLOW/STRAW      Appearance CLEAR      Specific Gravity, UA 1.013 1.001 - 1.023      pH, Urine 5.0 5.0 - 9.0      Protein, UA Negative NEG mg/dL    Glucose, UA Negative mg/dL    Ketones, Urine Negative NEG mg/dL    Bilirubin Urine Negative NEG      Blood, Urine Negative NEG      Urobilinogen, Urine 0.2 0.2 - 1.0 EU/dL    Nitrite, Urine Negative NEG      Leukocyte Esterase, Urine Negative NEG      Urine Culture if Indicated CULTURE NOT INDICATED BY UA RESULT      WBC, UA 0-4 (A) NORM /hpf    RBC, UA 0-5 (A) NORM /hpf    BACTERIA, URINE Negative (A) NORM /hpf    Epithelial Cells UA 0-5 (A) NORM /hpf    Casts 0-2 (A) NORM /lpf    Mucus, UA 0 0 /lpf   Lactate, Sepsis    Collection Time: 06/16/22  2:13 PM   Result Value Ref Range    Lactic Acid, Sepsis 1.9 0.4 - 2.0 MMOL/L   Hemoglobin and Hematocrit    Collection Time: 06/16/22 10:35 PM   Result Value Ref Range    Hemoglobin 10.3 (L) 13.6 - 17.2 g/dL    Hematocrit 32.7 (L) 41.1 - 50.3 %   Basic Metabolic Panel w/ Reflex to MG    Collection Time: 06/17/22  7:04 AM   Result Value Ref Range    Sodium 140 138 - 145 mmol/L    Potassium 5.5 (H) 3.5 - 5.1 mmol/L    Chloride 116 (H) 98 - 107 mmol/L    CO2 19 (L) 21 - 32 mmol/L    Anion Gap 5 (L) 7 - 16 mmol/L    Glucose 95 65 - 100 mg/dL    BUN 34 (H) 8 - 23 MG/DL    CREATININE 2.30 (H) 0.8 - 1.5 MG/DL    GFR African American 35 (L) >60 ml/min/1.73m2    GFR Non- 29 (L) >60 ml/min/1.73m2    Calcium 9.3 8.3 - 10.4 MG/DL   CBC with Auto Differential    Collection Time: 06/17/22  7:04 AM   Result Value Ref Range    WBC 12.8 (H) 4.3 - 11.1 K/uL    RBC 3.18 (L) 4.23 - 5.6 M/uL    Hemoglobin 10.3 (L) 13.6 - 17.2 g/dL    Hematocrit 32.2 (L) 41.1 - 50.3 %    .3 (H) 79.6 - 97.8 FL    MCH 32.4 26.1 - 32.9 PG    MCHC 32.0 31.4 - 35.0 g/dL    RDW 15.6 (H) 11.9 - 14.6 %    Platelets 306 (L) 988 - 450 K/uL    MPV 12.5 (H) 9.4 - 12.3 FL    nRBC 0.00 0.0 - 0.2 K/uL    Differential Type AUTOMATED      Seg Neutrophils 83 (H) 43 - 78 %    Lymphocytes 9 (L) 13 - 44 %    Monocytes 7 4.0 - 12.0 %    Eosinophils % 1 0.5 - 7.8 %    Basophils 0 0.0 - 2.0 %    Immature Granulocytes 0 0.0 - 5.0 %    Segs Absolute 10.5 (H) 1.7 - 8.2 K/UL    Absolute Lymph # 1.2 0.5 - 4.6 K/UL    Absolute Mono # 0.9 0.1 - 1.3 K/UL    Absolute Eos # 0.1 0.0 - 0.8 K/UL    Basophils Absolute 0.0 0.0 - 0.2 K/UL    Absolute Immature Granulocyte 0.1 0.0 - 0.5 K/UL       Other Studies:  CT CHEST ABDOMEN PELVIS WO CONTRAST   Final Result      1. No evidence of malignancy in the chest, abdomen, or pelvis by noncontrast CT. 2. Extensive colonic diverticulosis with mild acute diverticulitis involving the   descending colon. 3. Severely distended urinary bladder with severe right hydroureteronephrosis,   possibly resulting from the presumed chronic bladder outlet obstruction. No   obstructing etiology identified at the UVJ. 4. Bilateral renal atrophy with 3 mm nonobstructing right renal calculus. XR CHEST PORTABLE   Final Result      1. Shallow inspiratory changes.       CPT code(s) 06470                      Current Meds:  Current Facility-Administered Medications   Medication Dose Route Frequency    atorvastatin (LIPITOR) tablet 40 mg  40 mg Oral Nightly    traZODone (DESYREL) tablet 50 mg  50 mg Oral Nightly    sodium chloride flush 0.9 % injection 5-40 mL  5-40 mL IntraVENous 2 times per day    sodium chloride flush 0.9 % injection 5-40 mL  5-40 mL IntraVENous PRN    0.9 % sodium chloride infusion   IntraVENous PRN    ondansetron (ZOFRAN-ODT) disintegrating tablet 4 mg  4 mg Oral Q8H PRN    Or    ondansetron (ZOFRAN) injection 4 mg  4 mg IntraVENous Q6H PRN    polyethylene glycol (GLYCOLAX) packet 17 g  17 g Oral Daily PRN    acetaminophen (TYLENOL) tablet 650 mg  650 mg Oral Q6H PRN    Or    acetaminophen (TYLENOL) suppository 650 mg  650 mg Rectal Q6H PRN    tuberculin injection 5 Units  5 Units IntraDERmal Once    sodium bicarbonate tablet 650 mg  650 mg Oral TID    tamsulosin (FLOMAX) capsule 0.4 mg  0.4 mg Oral Daily    piperacillin-tazobactam (ZOSYN) 3,375 mg in sodium chloride 0.9 % 50 mL IVPB (mini-bag)  3,375 mg IntraVENous q8h       Signed:  Regino Mixon MD    Part of this note may have been written by using a voice dictation software. The note has been proof read but may still contain some grammatical/other typographical errors.

## 2022-06-17 NOTE — CONSULTS
Comprehensive Nutrition Assessment    Type and Reason for Visit: Initial,Consult  Unintentional Weight Loss (Hospitalists)    Nutrition Recommendations/Plan:   Meals and Snacks:  Diet: Continue current order  Nutrition Supplement Therapy:   Medical food supplement therapy:  Change Ensure Enlive once per day (this provides 350 kcal and 20 grams protein per bottle) and Magic Cup twice per day (this provides 290 kcal and 9 grams protein per serving)      Malnutrition Assessment:  Malnutrition Status: Moderate malnutrition  Context: Chronic Illness  Findings of clinical characteristics of malnutrition:   Energy Intake:  Mild decrease in energy intake (Comment) (declining po d/t taste alteration unable to quantify)  Weight Loss:  Greater than 7.5% over 3 months (8% loss over 2 mos)     Body Fat Loss:  Mild body fat loss Triceps   Muscle Mass Loss:  Mild muscle mass loss Temples (temporalis),Hand (interosseous)  Fluid Accumulation:   (+ edema ble)     Strength:  Not Performed  Nutrition Assessment:  Nutrition History: Hx per pt and daughter (who does not live w him) at bedside. Pt c/o taste alterations and poor appetite leading to declining oral intake. HE reports at times foods sounds good but he end sup giving it to the dog including a recent trip from [place] primarily associated with taste alterations. He states everything tastes the same but is unable to describe the taste. He reports water, milk and oj still taste the same. Daughters have been attempting to get him to drink Ensure and gatorade varied success reported. Daughter at bedside reports 50# wt loss over several months, h&p indicates 40# wt loss. Wt hx per EMR review: Cards office:  233# Aug 2021, 228 Jan 2022, IM office 215# April 2022, 199# early June.        Do You Have Any Cultural, Zoroastrianism, or Ethnic Food Preferences?: No   Nutrition Background:   medical history significant for prostate cancer s/p radiation, CVA, CKD stage 3, HLD, known urinary retention but does not wear a Mckeon at home, aortic valve stenosis, and irregular heart rhythm on low dose Eliquis. Admitted with diverticulitis with GI hemorrhage, acute blood loss anemia d/t GIB, MIKO on CKD d/t urinary retention from obstructive uropathy, FTT, irregular cardiac rhythm, metabolic acidosis d/t miko. Nutrition Interval:  Wt loss of 37# since August of 2021, wt loss noted to be more rapid since April. Daughter notes intake here time one meal is greater than baseline. Pt states this is because he know he has to eat. Pt continues with diarrhea, reports ongoing concerns about vision issues that have occurred over the past few days. Tongue is light maroon in color with a darker spot on the left side that pt reports has been there for years. Current Nutrition Therapies:  ADULT DIET; Regular; Low Fat/Low Chol/High Fiber/2 gm Na  ADULT ORAL NUTRITION SUPPLEMENT; AM Snack, PM Snack, HS Snack; Standard High Calorie/High Protein Oral Supplement    Current Intake:   Average Meal Intake: 51-75% (one meal)        Anthropometric Measures:  Height: 5' 7\" (170.2 cm)  Current Body Wt: 195 lb 15.8 oz (88.9 kg) (6/16), Weight source: Stated  BMI: 30.7  Admission Body Weight: 195 lb 15.8 oz (88.9 kg) (stated, pt reprots standing daily home wt)  Ideal Body Weight (Kg) (Calculated): 67 kg (148 lbs), 132.4 %  Usual Body Wt: 215 lb (97.5 kg) (IM office April, 199# IM office early June), Percent weight change: -8.8       Edema: No data recorded  BMI Category Obese Class 1 (BMI 30.0-34. 9)  Estimated Daily Nutrient Needs:  Energy (kcal/day): 9646-4367 (20-25 kcal/kg) (Kcal/kg Weight used: 88.9 kg Admission  Protein (g/day):  (1-1.2 g/kg) Weight Used: (Admission) 88.9 kg  Fluid (ml/day):   (1 ml/kcal)    Nutrition Diagnosis:   · Inadequate oral intake related to  (taste alterations, poor appetite) as evidenced by  (self reported barreirs, po here greater than home thus far)    · Moderate malnutrition,In context of chronic illness related to altered taste perception (declining oral intake) as evidenced by Criteria as identified in malnutrition assessment    Nutrition Interventions:   Food and/or Nutrient Delivery: Continue Current Diet,Start Oral Nutrition Supplement     Coordination of Nutrition Care: Continue to monitor while inpatient       Goals: Active Goal: PO intake 50% or greater,by next RD assessment       Nutrition Monitoring and Evaluation:      Food/Nutrient Intake Outcomes: Food and Nutrient Intake,Supplement Intake  Physical Signs/Symptoms Outcomes: Biochemical Data,GI Status,Fluid Status or Edema,Weight,Meal Time Behavior    Discharge Planning:     Too soon to determine    JOHANNE PHAM, RD

## 2022-06-17 NOTE — PROGRESS NOTES
Chart reviewed by [facility] and discussed in IDR. Patient MIKO on CKD stage 3 due to urinary retention. On IVF, starting Flomax. PT/OT evals with no needs after discharge. Potential discharge Sunday, June 19th. CM following.

## 2022-06-17 NOTE — PROGRESS NOTES
PHYSICAL THERAPY Daily Note and AM  (Link to Caseload Tracking: PT Visit Days : 2  Time In/Out PT Charge Capture  Rehab Caseload Tracker  Orders      Belen Ventura is a 80 y.o. male   PRIMARY DIAGNOSIS: Diverticulitis  Urinary retention [R33.9]  Diverticulitis [K57.92]  Diverticulosis [K57.90]  Diverticulitis of colon [K57.32]  Weight loss [R63.4]  Hypotension, unspecified hypotension type [I95.9]  Sepsis, due to unspecified organism, unspecified whether acute organ dysfunction present ([facility] [address] [A41.9]       Inpatient: Payor: Zak Maradiaga / Plan: [employer] / Product Type: *No Product type* /     ASSESSMENT:     REHAB RECOMMENDATIONS:   Recommendation to date pending progress:  Setting:   No further skilled therapy after discharge from hospital    Equipment:     To Be Determined     ASSESSMENT:  Mr. Maya Ospina was supine at arrival desires to get out of bed. He got to EOB with Srinivasan. Stood and ambulated returning to chair. Pace was slow with social stopping talking with RNs. Pt improving with ambulation and less asst levels.  .     SUBJECTIVE:   Mr. Maya Ospina states, \"I need to get out of this bed\"     Social/Functional Lives With: Daughter  Type of Home: House  Home Layout: Able to Live on Main level with bedroom/bathroom  Home Access: Stairs to enter without rails  Entrance Stairs - Number of Steps: 4  OBJECTIVE:     PAIN: Gwendolyn Hubert / O2: Chasity Toledo / Theresa Odell / Mary Gonzalez:   Pre Treatment:          Post Treatment: 0 Vitals        Oxygen    Mckeon Catheter and IV    RESTRICTIONS/PRECAUTIONS:        MOBILITY: I Mod I S SBA CGA Min Mod Max Total  NT x2 Comments:   Bed Mobility    Rolling [] [] [] [] [] [] [] [] [] [] []    Supine to Sit [] [] [] [] [] [] [] [] [] [] []    Scooting [] [] [] [] [] [] [] [] [] [] []    Sit to Supine [] [] [] [] [] [] [] [] [] [] []    Transfers    Sit to Stand [] [] [] [] [] [] [] [] [] [] []    Bed to Chair [] [] [] [] [] [] [] [] [] [] []    Stand to Sit [] [] [] [] [] [] [] [] [] [] []     [] [] [] [] [] [] [] [] [] [] []    I=Independent, Mod I=Modified Independent, S=Supervision, SBA=Standby Assistance, CGA=Contact Guard Assistance,   Min=Minimal Assistance, Mod=Moderate Assistance, Max=Maximal Assistance, Total=Total Assistance, NT=Not Tested    BALANCE: Good Fair+ Fair Fair- Poor NT Comments   Sitting Static [] [] [] [] [] []    Sitting Dynamic [] [] [] [] [] []              Standing Static [] [] [] [] [] []    Standing Dynamic [] [] [] [] [] []      GAIT: I Mod I S SBA CGA Min Mod Max Total  NT x2 Comments:   Level of Assistance [] [] [] [] [] [] [] [] [] [] []    Distance   feet    DME Rolling Walker    Gait Quality Decreased justin , Decreased step clearance, Decreased step length, Decreased stance and Shuffling     Weightbearing Status      Stairs      I=Independent, Mod I=Modified Independent, S=Supervision, SBA=Standby Assistance, CGA=Contact Guard Assistance,   Min=Minimal Assistance, Mod=Moderate Assistance, Max=Maximal Assistance, Total=Total Assistance, NT=Not Tested    PLAN:   ACUTE PHYSICAL THERAPY GOALS:   (Developed with and agreed upon by patient and/or caregiver.)  (1.) Belen Ventura will perform bed mobility with INDEPENDENCE within 5 treatment day(s). (2.) Belen Ventura will transfer from bed to chair and chair to bed with INDEPENDENCE using the least restrictive device within 5 treatment day(s). (3.) Belen Ventura will ambulate with INDEPENDENCE for 250 feet with the least restrictive device within 5 treatment day(s). (4.) Belen Ventura will ascend and descend 4 stairs using either hand rail(s) with MODIFIED INDEPENDENCE to improve functional mobility and safety within 5 treatment day(s). (5.) Belen Ventura will perform bilateral lower extremity exercises x 15 min for HEP with SUPERVISION to improve strength, endurance, and functional mobility within 5 treatment day(s).     FREQUENCY AND DURATION: 3 times/week for duration of hospital stay or until stated goals are met, whichever comes first.    TREATMENT:   TREATMENT:   Therapeutic Activity (24 Minutes): Therapeutic activity included Rolling, Supine to Sit, Scooting, Lateral Scooting, Transfer Training, Ambulation on level ground, Sitting balance  and Standing balance to improve functional Activity tolerance, Balance, Coordination, Mobility and Strength.     TREATMENT GRID:  N/A    AFTER TREATMENT PRECAUTIONS: Call light within reach, Chair and Needs within reach    INTERDISCIPLINARY COLLABORATION:  RN/ PCT and PT/ PTA    EDUCATION:      TIME IN/OUT:  Time In: 1100  Time Out: [address]  Minutes: [address] Hopkins PT

## 2022-06-18 NOTE — PROGRESS NOTES
Attempted to place consult ordered for today by hospitalist yesterday for ophthalmology due to blurred vision. Only reached voicemail, left message but unsure if this will be followed up on today. Pt today states he has no vision javier R eye, this is new. (consult was placed for blurred vision that started week ago). Dr Efren Miller notified.

## 2022-06-18 NOTE — CONSULTS
Ophthalmology Consult Note            Date:   06/18/22  Patient name:  Belen Ventura  Date of admission:  6/16/2022 10:38 AM  MRN:   [id number]  YOB: 1932    Physician Requesting Consult: Andre Pickens MD    Reason for Consult:  Vision loss right eye    Complaint:     Vision loss right eye    Chief Complaint   Patient presents with    Hypotension       History Obtained From:     patient    History of Present Illness: The patient is a 80 y.o.  male with PMHx significant for prostate cancer s/p radiation, CVA, CKD stage 3, HLD, known urinary retention but does not wear a Mckeon at home, aortic valve stenosis, and irregular heart rhythm on low dose Eliquis who presented with generalized weakness and 40 pound weight loss for the past two months and is currently admitted for diverticulitis who reports blurred vision in his right eye for approximately 1 week with worsening today. He reports he saw his regular ophthalmologist a little over 1 week ago and was told \"everything was fine\", however started having blurred vision in the right eye the next day. He reports that this persisted over the following week and through his hospital admission on 6/16. He woke up this morning and the blurred vision in the right eye was significantly worse and he is now having blurred vision in his left eye as well. He also reports a frontal headache throughout this time. He denies scalp tenderness, jaw pain/claudication, difficulty chewing, ocular or periorbital pain, pain with EOMs, floaters, photopsias, or curtain-signs. He has a history of bilateral cataract surgery but denies history of glaucoma, uveitis, or other significant ocular history.    Past Medical History:     Past Medical History:   Diagnosis Date    Acute CVA (cerebrovascular accident) ([address]     Cancer of prostate ([facility] 2001    radiation tx     CKD (chronic kidney disease) stage 3, GFR 30-59 ml/min ([address] 8/18/2012    Gout 8/18/2012    Metabolic syndrome 5/32/3902    Mixed hyperlipidemia 8/18/2012    Moderate aortic valve stenosis     Peptic ulcer disease 8/18/2012    Unspecified essential hypertension 8/18/2012    Urinary retention         Past Surgical History:     Past Surgical History:   Procedure Laterality Date    APPENDECTOMY  1950    CHOLECYSTECTOMY      COLONOSCOPY  2002    GASTRECTOMY      V&A, 1999    TURP  2000, 2015    Dx CA, followed by radiation Rx        Medications: Allergies: Allergies   Allergen Reactions    Gabapentin Other (See Comments)     Dizzy. Pt reports that he's now taking the medicine at night before bed - no longer having issues and is continuing to take this med.  9/22/2016       Medications Prior to Admission:  Prior to Admission medications    Medication Sig Start Date End Date Taking?  Authorizing Provider   ondansetron (ZOFRAN) 4 MG tablet Take 1 tablet by mouth every 8 hours as needed for Nausea or Vomiting 6/12/22   Cj Willis Potlatch MD James   fluticasone [facility]) 50 MCG/ACT nasal spray USE 2 SPRAYS IN EACH NOSTRIL EVERY DAY  Patient not taking: Reported on 6/8/2022 6/8/22   Jazz Leon MD   traZODone (DESYREL) 50 MG tablet TAKE 1 TABLET EVERY NIGHT 6/2/22   Jazz Leon MD   atorvastatin (LIPITOR) 40 MG tablet TAKE 1 TABLET EVERY NIGHT 6/2/22   Jazz Leon MD   acetaminophen (TYLENOL) 500 MG tablet Take 1,000 mg by mouth    Ar Automatic Reconciliation   allopurinol (ZYLOPRIM) 300 MG tablet Take 300 mg by mouth daily 3/21/22   Ar Automatic Reconciliation   apixaban (ELIQUIS) 2.5 MG TABS tablet Take 2.5 mg by mouth 2 times daily 1/31/22   Ar Automatic Reconciliation   aspirin 81 MG EC tablet Take 81 mg by mouth daily 2/2/20   Ar Automatic Reconciliation   benazepril (LOTENSIN) 40 MG tablet Take 40 mg by mouth daily 3/21/22   Ar Automatic Reconciliation   furosemide (LASIX) 20 MG tablet Take 20 mg by mouth daily 8/3/21   Ar Automatic Reconciliation       Current Meds: Scheduled Meds:    pantoprazole (PROTONIX) 40 mg injection  40 mg IntraVENous Daily    methylPREDNISolone (SOLU-MEDROL) IVPB  500 mg IntraVENous Q12H    atorvastatin  40 mg Oral Nightly    traZODone  50 mg Oral Nightly    sodium chloride flush  5-40 mL IntraVENous 2 times per day    sodium bicarbonate  650 mg Oral TID    tamsulosin  0.4 mg Oral Daily    piperacillin-tazobactam  3,375 mg IntraVENous q8h     Infusions:    sodium chloride 75 mL/hr at 06/18/22 3620    sodium chloride       PRN Meds: sodium chloride flush, sodium chloride, ondansetron **OR** ondansetron, polyethylene glycol, acetaminophen **OR** acetaminophen    Allergies:     Gabapentin    Social History:     Tobacco:    reports that he quit smoking about 62 years ago. He smoked 1.00 pack per day. He has never used smokeless tobacco.  Alcohol:      reports no history of alcohol use. Drug Use:  reports no history of drug use. Family History:     Family History   Problem Relation Age of Onset    Dementia Father        Review of Systems:     Positive and Negative as described in HPI    · CONSTITUTIONAL:   +fatigue, negative for fevers, chills,  · HEENT: +frontal headache, blurred vision right>>left   · RESPIRATORY:negative for shortness of breath, cough, congestion, nowheezing. · CARDIOVASCULAR: negative for chest pain, no palpitations.   · GASTROINTESTINAL: +diarrhea, no nausea, no vomiting, no abdominal pain   · NEUROLOGICAL: no numbness,no tingling, dizziness, lightheadedness    10 point ROS otherwise negative with exception of those noted above    Physical Exam:     Vitals:    06/18/22 0346 06/18/22 0703 06/18/22 0915 06/18/22 1146   BP: 110/69 108/60  115/65   Pulse: 75 69 81 70   Resp: 18 17 17   Temp: 98.4 °F (36.9 °C) 98.2 °F (36.8 °C)  98.2 °F (36.8 °C)   TempSrc: Oral Oral  Oral   SpO2: 96% 97%  98%   Weight:       Height:           Physical Exam   General Appearance  Alert, awake, oriented x 3, not in acute distress    Eye examination:     Right eye Left eye   Visual acuity  HAND MOTION   20/100 ECC   Pupils     SLUGGISH, +APD   WNL   Extraocular movement     EOMS WNL    Confrontational visual field     GLOBAL DEPRESSION  SUPERIOR LOSS   Intraocular pressure   STP  STP        External   WNL WNL   Lids/lashes/adnexae WNL, NO PTOSIS WNL, NO PTOSIS        Conjunctivae/sclerae  W/Q  W/Q   Corneas CLEAR CLEAR   Anterior chambers D/Q D/Q   Irides WNL WNL   Lenses PCIOL PCIOL        Vitreous  PVD  PVD   Optic nerves  360 PALLID EDEMA  PALLID EDEMA INFERIORLY   Macula PALLOR NASALLY, DRUSEN, PIGMENT CHANGES DRUSEN, PIGMENT CHANGES   Vessels  ATTENUATED, PERFUSED ATTENUATED, PERFUSED   Retinal periphery FLAT/ATTACHED, NO TEARS/DETACHMENTS  CHOROIDAL NEVUS (2 DD) WITH OVERLYING DRUSEN FLAT/ATTACHED, NO TEARS/DETACHMENTS       Investigations:      Laboratory Testing:  Recent Results (from the past 24 hour(s))   PLEASE READ & DOCUMENT PPD TEST IN 24 HRS    Collection Time: 06/17/22  4:46 PM   Result Value Ref Range    PPD, (POC) Negative Negative    mm Induration 0 0 - 5 mm   CBC    Collection Time: 06/18/22  6:43 AM   Result Value Ref Range    WBC 12.2 (H) 4.3 - 11.1 K/uL    RBC 3.09 (L) 4.23 - 5.6 M/uL    Hemoglobin 9.9 (L) 13.6 - 17.2 g/dL    Hematocrit 31.2 (L) 41.1 - 50.3 %    .0 (H) 79.6 - 97.8 FL    MCH 32.0 26.1 - 32.9 PG    MCHC 31.7 31.4 - 35.0 g/dL    RDW 15.9 (H) 11.9 - 14.6 %    Platelets 145 (L) 546 - 450 K/uL    MPV 12.5 (H) 9.4 - 12.3 FL    nRBC 0.00 0.0 - 0.2 K/uL   Basic Metabolic Panel    Collection Time: 06/18/22  6:43 AM   Result Value Ref Range    Sodium 142 136 - 145 mmol/L    Potassium 4.8 3.5 - 5.1 mmol/L    Chloride 116 (H) 98 - 107 mmol/L    CO2 19 (L) 21 - 32 mmol/L    Anion Gap 7 7 - 16 mmol/L    Glucose 102 (H) 65 - 100 mg/dL    BUN 33 (H) 8 - 23 MG/DL    CREATININE 2.20 (H) 0.8 - 1.5 MG/DL    GFR  36 (L) >60 ml/min/1.73m2    GFR Non- 30 (L) >60 ml/min/1.73m2    Calcium 9.6 8.3 - 10.4 MG/DL   Transferrin Saturation    Collection Time: 06/18/22  6:43 AM   Result Value Ref Range    Iron 15 (L) 35 - 150 ug/dL    TIBC 315 250 - 450 ug/dL    TRANSFERRIN SATURATION 5 (L) >20 %   Vitamin B12    Collection Time: 06/18/22  6:43 AM   Result Value Ref Range    Vitamin B-12 468 193 - 986 pg/mL   Ferritin    Collection Time: 06/18/22  6:43 AM   Result Value Ref Range    Ferritin 82 8 - 388 NG/ML   Folate    Collection Time: 06/18/22  6:43 AM   Result Value Ref Range    Folate 5.6 3.1 - 17.5 ng/mL   C-Reactive Protein    Collection Time: 06/18/22 11:29 AM   Result Value Ref Range    CRP 7.1 (H) 0.0 - 0.9 mg/dL   Sedimentation Rate    Collection Time: 06/18/22 11:29 AM   Result Value Ref Range    Sed Rate, Automated 18 (H) 15 mm/hr       Imaging/[employer]agonstics:  XR CHEST PA LAT (2 VIEWS)    Result Date: 6/8/2022  CHEST X-RAY, 2 views. INDICATION:  Loss of appetite and weight loss and weakness. TECHNIQUE: PA and lateral views. COMPARISON: None. FINDINGS: Lungs: Calcified nodules in both debi and right lower lobe. Lungs are hyperinflated. Costophrenic angles: are sharp. Heart size: is normal. Pulmonary vasculature: is unremarkable. Aorta: Mildly tortuous. . Included portion of the upper abdomen: is unremarkable. Bones: No gross bony lesions. Other: None. Negative for acute abnormality. Calcified granulomas. CT HEAD WO CONTRAST    Result Date: 6/12/2022  Exam: CT head without contrast. Indication: Photosensitivity, weight loss, weakness. Comparison: Head CT dated February 21, 2022. Multiple axial images obtained through the brain without intravenous contrast. Radiation dose reduction techniques were used for this study: All CT scans performed at this facility use one or all of the following: Automated exposure control, adjustment of the mA and/or kVp according to patient's size, iterative reconstruction. FINDINGS: Diffuse cerebral atrophy with commensurate ex vacuo dilatation of the ventricles. Scattered periventricular and centrum semiovale white matter hypointensities most indicative of chronic ischemic white matter change. No evidence of intracranial mass, hemorrhage, or large territorial infarct. The basal cisterns are patent. No extra-axial fluid collection or mass effect. Bilateral lens replacements. The paranasal sinuses are clear. The mastoid air cells and middle ears are clear. No significant osseous or extracranial soft tissue lesions. 1. No evidence of acute intracranial abnormality. Chronic changes as above. XR CHEST PORTABLE    Result Date: 6/16/2022  Exam: XR CHEST PORTABLE on 6/16/2022 11:10 AM Clinical History: The Male patient is 80years old  presenting for Sepsis. Comparison:  Chest x-ray 6/18/2022 Findings:  Frontal view of the chest was obtained. Shallow inspiratory changes are seen. No pleural effusions are demonstrated. The cardiomediastinal silhouette is within normal limits. There are no acute osseous abnormalities. 1. Shallow inspiratory changes. CPT code(s) 19382     CT CHEST ABDOMEN PELVIS WO CONTRAST    Result Date: 6/16/2022  EXAMINATION: CT CHEST, ABDOMEN, AND PELVIS 6/16/2022 11:26 AM ACCESSION NUMBER: [id number] INDICATION: 40 lb weight loss. Lower abdominal pain w/ bloody diarrhea COMPARISON: None available TECHNIQUE: Multiple contiguous axial CT images of the chest, abdomen, and pelvis were obtained from the thoracic inlet to the pubic symphysis without contrast. Radiation dose reduction techniques were used for this study:  Our CT scanners use one or all of the following: Automated exposure control, adjustment of the mA and/or kVp according to patient's size, iterative reconstruction. FINDINGS: CHEST: LUNGS/PLEURA: Central airways are patent. No consolidation, pleural effusion, or pneumothorax. Calcified granulomas in the right lower lobe. No suspicious pulmonary nodule or mass.  MEDIASTINUM: The visualized thyroid is normal. The thoracic esophagus is unremarkable. Small sliding hiatal hernia. The heart is normal in size without pericardial effusion. There are calcifications on the aortic valve. The great vessels are normal in caliber with atherosclerotic calcifications in the aorta, branch vessels, and coronary arteries. No mediastinal or hilar lymphadenopathy. There are multiple calcified right hilar and mediastinal lymph nodes compatible with prior granulomatous disease. BONES AND SOFT TISSUES: Subcutaneous soft tissues are within normal limits. No acute or aggressive osseous abnormality. ABDOMEN/PELVIS: LIVER: Normal BILIARY: Status post cholecystectomy. No biliary duct dilation. SPLEEN: Normal in size with numerous calcified granulomas. PANCREAS: No pancreatic duct dilation or mass. ADRENALS: No adrenal nodules or hypertrophy. URINARY: There is bilateral cortical thinning and renal atrophy. The urinary bladder is massively distended with severe right-sided hydroureteronephrosis. No obstructing etiology identified at the right UVJ. 3 mm nonobstructing right inferior pole renal calculus. Subcentimeter exophytic lesion on the anterior cortex of the left lower pole is too small to characterize but statistically a cyst. BOWEL: Stomach is normal in caliber and wall thickness. There are a few surgical clips adjacent to the gastric antrum. Small bowel is normal in caliber and wall thickness. Colonic diverticulosis with mild wall thickening and pericolonic fat stranding along the descending colon. No pericolonic abscess or free air. VESSELS: The abdominal aorta and iliac arterial system are nonaneurysmal with mild atherosclerotic calcifications. NODES: No lymphadenopathy. FLUID: No free intraperitoneal fluid. REPRODUCTIVE: The prostate is diminutive with a central area of hypoattenuation likely related to prior TURP. BONES/SOFT TISSUES: No acute or aggressive osseous abnormality. Regional soft tissues are unremarkable.      1. No evidence of malignancy in the chest, abdomen, or pelvis by noncontrast CT. 2. Extensive colonic diverticulosis with mild acute diverticulitis involving the descending colon. 3. Severely distended urinary bladder with severe right hydroureteronephrosis, possibly resulting from the presumed chronic bladder outlet obstruction. No obstructing etiology identified at the UVJ. 4. Bilateral renal atrophy with 3 mm nonobstructing right renal calculus. XR ABDOMEN (2 VIEWS)    Result Date: 6/8/2022  ABDOMINAL FILMS, 2 view(s). INDICATION: Loss of appetite unintentional weight loss. TECHNIQUE:  Supine and upright  views of the abdomen on 3 image(s). COMPARISON: November 2018 FINDINGS: The bowel gas pattern is unremarkable. No abnormally dilated bowel loops. No free air under the hemidiaphragm. No radiopaque calculi. The lungs are clear. Calcified granuloma right lung base Surgical clips right upper quadrant. Degenerative changes lumbar spine. Negative for free air, ileus or obstruction. 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    - Cilioretinal artery occlusion in right eye consistent with association of retinal artery occlusions in cases of GCA  - Recommend lab workup with ESR, CRP, platelets and fibrinogen, though with multiple comorbidities I suspect these non-specific inflammatory markers may not be as accurate in this case  - Given bilateral involvement and rapid progression, would recommend IV steroids (solumedrol 1g daily for 3 days) followed by transition to PO steroids (1 mg/kg), however understand that multiple comorbidities and current admission for diverticulitis makes this challenging. May benefit from Rheumatology input regarding potential treatment alternatives given this. - Recommend temporal artery biopsy within 3-5 days of initiating treatment to confirm diagnosis. 3. Choroidal Nevus, Right Eye  - appears benign with overlying drusen, flat, no feeder vessels or SRF  - will monitor with follow up      Thank you very much for this consult. If you have any questions or concerns, please do not hesitate to contact us at [facility].     [facility], [address] [facility]  Fax: [phone]  Main: [phone]    Additional offices in [place], [place], [facility], and [place]

## 2022-06-18 NOTE — PROGRESS NOTES
Hospitalist Progress Note   Admit Date:  2022 10:38 AM   Name:  Arline Harada   Age:  80 y.o. Sex:  male  :  1932   MRN:  [id number]   Room:      Presenting Complaint: Hypotension     Reason(s) for Admission: Urinary retention [R33.9]  Diverticulitis [K57.92]  Diverticulosis [K57.90]  Diverticulitis of colon [K57.32]  Weight loss [R63.4]  Hypotension, unspecified hypotension type [I95.9]  Sepsis, due to unspecified organism, unspecified whether acute organ dysfunction present [facility]) [A41.9]     Hospital Course & Interval History:   Please refer to the admission H&P for details of presentation. In summary, Arline Harada is a 80 y.o. male with medical history significant for prostate cancer s/p radiation, CVA, CKD stage 3, HLD, known urinary retention but does not wear a Mckeon at home, aortic valve stenosis, and irregular heart rhythm on low dose Eliquis who presented with cc generalized weakness and 40 pound weight loss for the past two months. Hypotensive with dark red blood per rectum on admission. Stool occult positive. CT C/A/P showed no evidence of malignancy but with extensive colonic diverticulosis with mild acute diverticulitis involving the descending colon, severely distended urinary bladder with right hydroureteronephrosis, b/l renal atrophy with 3mm non obstructing right renal stone. Subjective/24 hr Events (22) : Patient is seen and examined at bedside. No acute events reported overnight by nursing staff. Patient reports that he continues to have diarrhea but has improved and frequency. Unclear whether he is still having bloody BM. Patient tolerating p.o. diet and states that his appetite has slightly improved as well as his taste. Prior to arrival, patient reports not having good appetite and everything tasting bland, as a result he has lost a good amount of weight. He also complains of blurriness that has been ongoing for about a week.   He woke up this morning and noticed that he is unable to see through his right eye when covering his left. He is able to see light in his right eye but unable to fully differentiate objects or things. He denies any jaw pain/claudication, scalp tenderness or difficulty chewing. Patient denies fever, chills, chest pains, shortness of breath. Review of Systems: 10 point review of systems is otherwise negative with the exception of the elements mentioned above. Assessment & Plan:     Diverticulitis with GI hemorrhage  CT A/P reviewed. 06/18/22: Continues to have diarrhea but has improved compared to prior to arrival.  Blood culture with no growth for 1 day  -Continue with Zosyn (6/16 - )  -Followup BCx    Acute blood loss anemia due to GI bleed from diverticulitis  Hemoglobin of 11.9 on admission with Hb of 12.6 on 6/12.    06/18/22: Hemoglobin now down to 9.9. 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 -Ophthalmology consulted. Discussed with ophthalmology and they are concerned for possible giant cell arteritis.  -Neurology consulted  -Obtain ESR and CRP  -We will start on Solu-Medrol 500 mg twice daily for 3 days. We will need to monitor his diverticulitis closely given that high-dose steroids could worsen the situation.  - obtain MRI to rule out mass, CVA    MIKO on CKD3 due to urinary retention from obstructive uropathy  Rght hydroureteronephrosis with 3mm non obstructing right renal stone  Prostate Ca s/p radiation   S/p Corrigan Catheter insertion  Known urinary retention without corrigan at home. 06/18/22: Renal function not improving. Cr still elevated.   - likely will need to go with corrigan catheter and outpatient urology follow up  - start flomax   - will continue maintenance fluids Failure to thrive in adult  Weight loss X 2 months. CT C/A/P without any signs of malignancy  - nutrition consulted    Irregular cardiac rhythm  EKG reviewed. Currently on sinus.  - hold eliquis give bleeding. May need to restart if Hb stable given stroke risk  - will keep him on telemetry    Metabolic acidosis likely due to MIKO  Bicarb of 15 on admission  - continue on sodium bicarb tablets  - monitor BMP    Hypertension // HLD   - resume home lipitor  - hold home benazapril and lasix due to MIKO    Dispo: Home when medically stable    Diet:  ADULT DIET; Regular; Low Fat/Low Chol/High Fiber/2 gm Na  ADULT ORAL NUTRITION SUPPLEMENT; Breakfast; Standard High Calorie/High Protein Oral Supplement  ADULT ORAL NUTRITION SUPPLEMENT; Lunch, Dinner; Frozen Oral Supplement  DVT PPx: SDCs  Code status: DNR        I have reviewed and ordered clinical lab tests and independently visualized images, tracing. I spent 45 minutes of time caring for this patient at bedside or nearby, more than 50 percent of which was spent on coordination of care and/or counseling regarding the disease process, treatment options, and treatment plan. Discussed with ophthalmology. Discussed with patient and patient's daughter at bedside. Hospital Problems:  Principal Problem:    Diverticulitis  Active Problems:    Diverticulosis    Failure to thrive in adult    Hydroureteronephrosis    Anemia    Gastrointestinal hemorrhage associated with intestinal diverticulitis    Renal calculus, right    Prostate cancer (HCC)    S/P radiation therapy    Metabolic acidosis    Mckeon catheter in place    DNR (do not resuscitate)    Irregular cardiac rhythm    Acute kidney injury superimposed on chronic kidney disease ([address]    Hypertension    Mixed hyperlipidemia    Stage 3 chronic kidney disease ([address]    Aortic stenosis, moderate    Urinary retention  Resolved Problems:    * No resolved hospital problems.  *      Objective:     Patient Vitals for the past 24 hrs:   Temp Pulse Resp BP SpO2   06/18/22 1146 98.2 °F (36.8 °C) 70 17 115/65 98 %   06/18/22 0915 -- 81 -- -- --   06/18/22 0703 98.2 °F (36.8 °C) 69 17 108/60 97 %   06/18/22 0346 98.4 °F (36.9 °C) 75 18 110/69 96 %   06/18/22 0242 98.4 °F (36.9 °C) 75 18 110/69 96 %   06/17/22 2339 -- 74 -- -- --   06/17/22 2256 98.8 °F (37.1 °C) 67 18 (!) 107/51 96 %   06/17/22 2057 -- -- -- (!) 100/53 --   06/17/22 2014 98.4 °F (36.9 °C) 64 18 (!) 95/52 97 %   06/17/22 1905 -- 64 -- -- 97 %   06/17/22 1657 -- -- 18 -- --   06/17/22 1553 98.1 °F (36.7 °C) 69 17 (!) 123/56 99 %       Oxygen Therapy  SpO2: 98 %  O2 Device: None (Room air)    Estimated body mass index is 30.7 kg/m² as calculated from the following:    Height as of this encounter: 5' 7\" (1.702 m). Weight as of this encounter: 196 lb (88.9 kg). Intake/Output Summary (Last 24 hours) at 6/18/2022 1309  Last data filed at 6/18/2022 1148  Gross per 24 hour   Intake 560.5 ml   Output 1210 ml   Net -649.5 ml         Physical Exam:     Blood pressure 115/65, pulse 70, temperature 98.2 °F (36.8 °C), temperature source Oral, resp. rate 17, height 5' 7\" (1.702 m), weight 196 lb (88.9 kg), SpO2 98 %. General:    Well nourished. Alert and awake, ill appearing  Head:  Normocephalic, atraumatic  Eyes:  Sclerae appear normal.  Pupils equally round. ENT:  Nares appear normal, no drainage. Moist oral mucosa  Neck:  No restricted ROM. Trachea midline   CV:   RRR. No jugular venous distension. Lungs:   CTAB. No wheezing  Respirations even, unlabored  Abdomen: Bowel sounds present. Soft, nontender, nondistended. Extremities: No cyanosis or clubbing. No edema  Skin:     No rashes and normal coloration. Warm and dry. Neuro:  CN II-XII grossly intact. A&Ox3  Psych:  Normal mood and affect.       I have reviewed ordered lab tests and independently visualized imaging below:    Recent Labs:  Recent Results (from the past 48 hour(s))   Lactate, Sepsis Collection Time: 06/16/22  2:13 PM   Result Value Ref Range    Lactic Acid, Sepsis 1.9 0.4 - 2.0 MMOL/L   Hemoglobin and Hematocrit    Collection Time: 06/16/22 10:35 PM   Result Value Ref Range    Hemoglobin 10.3 (L) 13.6 - 17.2 g/dL    Hematocrit 32.7 (L) 41.1 - 50.3 %   Basic Metabolic Panel w/ Reflex to MG    Collection Time: 06/17/22  7:04 AM   Result Value Ref Range    Sodium 140 138 - 145 mmol/L    Potassium 5.5 (H) 3.5 - 5.1 mmol/L    Chloride 116 (H) 98 - 107 mmol/L    CO2 19 (L) 21 - 32 mmol/L    Anion Gap 5 (L) 7 - 16 mmol/L    Glucose 95 65 - 100 mg/dL    BUN 34 (H) 8 - 23 MG/DL    CREATININE 2.30 (H) 0.8 - 1.5 MG/DL    GFR African American 35 (L) >60 ml/min/1.73m2    GFR Non- 29 (L) >60 ml/min/1.73m2    Calcium 9.3 8.3 - 10.4 MG/DL   CBC with Auto Differential    Collection Time: 06/17/22  7:04 AM   Result Value Ref Range    WBC 12.8 (H) 4.3 - 11.1 K/uL    RBC 3.18 (L) 4.23 - 5.6 M/uL    Hemoglobin 10.3 (L) 13.6 - 17.2 g/dL    Hematocrit 32.2 (L) 41.1 - 50.3 %    .3 (H) 79.6 - 97.8 FL    MCH 32.4 26.1 - 32.9 PG    MCHC 32.0 31.4 - 35.0 g/dL    RDW 15.6 (H) 11.9 - 14.6 %    Platelets 589 (L) 642 - 450 K/uL    MPV 12.5 (H) 9.4 - 12.3 FL    nRBC 0.00 0.0 - 0.2 K/uL    Differential Type AUTOMATED      Seg Neutrophils 83 (H) 43 - 78 %    Lymphocytes 9 (L) 13 - 44 %    Monocytes 7 4.0 - 12.0 %    Eosinophils % 1 0.5 - 7.8 %    Basophils 0 0.0 - 2.0 %    Immature Granulocytes 0 0.0 - 5.0 %    Segs Absolute 10.5 (H) 1.7 - 8.2 K/UL    Absolute Lymph # 1.2 0.5 - 4.6 K/UL    Absolute Mono # 0.9 0.1 - 1.3 K/UL    Absolute Eos # 0.1 0.0 - 0.8 K/UL    Basophils Absolute 0.0 0.0 - 0.2 K/UL    Absolute Immature Granulocyte 0.1 0.0 - 0.5 K/UL   PLEASE READ & DOCUMENT PPD TEST IN 24 HRS    Collection Time: 06/17/22  4:46 PM   Result Value Ref Range    PPD, (POC) Negative Negative    mm Induration 0 0 - 5 mm   CBC    Collection Time: 06/18/22  6:43 AM   Result Value Ref Range    WBC 12.2 (H) 4.3 - 11.1 K/uL    RBC 3.09 (L) 4.23 - 5.6 M/uL    Hemoglobin 9.9 (L) 13.6 - 17.2 g/dL    Hematocrit 31.2 (L) 41.1 - 50.3 %    .0 (H) 79.6 - 97.8 FL    MCH 32.0 26.1 - 32.9 PG    MCHC 31.7 31.4 - 35.0 g/dL    RDW 15.9 (H) 11.9 - 14.6 %    Platelets 551 (L) 339 - 450 K/uL    MPV 12.5 (H) 9.4 - 12.3 FL    nRBC 0.00 0.0 - 0.2 K/uL   Basic Metabolic Panel    Collection Time: 06/18/22  6:43 AM   Result Value Ref Range    Sodium 142 136 - 145 mmol/L    Potassium 4.8 3.5 - 5.1 mmol/L    Chloride 116 (H) 98 - 107 mmol/L    CO2 19 (L) 21 - 32 mmol/L    Anion Gap 7 7 - 16 mmol/L    Glucose 102 (H) 65 - 100 mg/dL    BUN 33 (H) 8 - 23 MG/DL    CREATININE 2.20 (H) 0.8 - 1.5 MG/DL    GFR  36 (L) >60 ml/min/1.73m2    GFR Non- 30 (L) >60 ml/min/1.73m2    Calcium 9.6 8.3 - 10.4 MG/DL   Transferrin Saturation    Collection Time: 06/18/22  6:43 AM   Result Value Ref Range    Iron 15 (L) 35 - 150 ug/dL    TIBC 315 250 - 450 ug/dL    TRANSFERRIN SATURATION 5 (L) >20 %   Vitamin B12    Collection Time: 06/18/22  6:43 AM   Result Value Ref Range    Vitamin B-12 468 193 - 986 pg/mL   Ferritin    Collection Time: 06/18/22  6:43 AM   Result Value Ref Range    Ferritin 82 8 - 388 NG/ML   Folate    Collection Time: 06/18/22  6:43 AM   Result Value Ref Range    Folate 5.6 3.1 - 17.5 ng/mL   C-Reactive Protein    Collection Time: 06/18/22 11:29 AM   Result Value Ref Range    CRP 7.1 (H) 0.0 - 0.9 mg/dL   Sedimentation Rate    Collection Time: 06/18/22 11:29 AM   Result Value Ref Range    Sed Rate, Automated 18 (H) 15 mm/hr       Other Studies:  CT CHEST ABDOMEN PELVIS WO CONTRAST   Final Result      1. No evidence of malignancy in the chest, abdomen, or pelvis by noncontrast CT. 2. Extensive colonic diverticulosis with mild acute diverticulitis involving the   descending colon.    3. Severely distended urinary bladder with severe right hydroureteronephrosis,   possibly resulting from the presumed chronic bladder outlet obstruction. No   obstructing etiology identified at the [facility]. 4. Bilateral renal atrophy with 3 mm nonobstructing right renal calculus. XR CHEST PORTABLE   Final Result      1. Shallow inspiratory changes. CPT code(s) 56343                  MRI BRAIN W WO CONTRAST    (Results Pending)   MRI BRAIN WO CONTRAST    (Results Pending)       Current Meds:  Current Facility-Administered Medications   Medication Dose Route Frequency    pantoprazole (PROTONIX) 40 mg in sodium chloride (PF) 10 mL injection  40 mg IntraVENous Daily    0.9 % sodium chloride infusion   IntraVENous Continuous    methylPREDNISolone sodium (SOLU-MEDROL) 500 mg in dextrose 5 % 250 mL IVPB  500 mg IntraVENous Q12H    atorvastatin (LIPITOR) tablet 40 mg  40 mg Oral Nightly    traZODone (DESYREL) tablet 50 mg  50 mg Oral Nightly    sodium chloride flush 0.9 % injection 5-40 mL  5-40 mL IntraVENous 2 times per day    sodium chloride flush 0.9 % injection 5-40 mL  5-40 mL IntraVENous PRN    0.9 % sodium chloride infusion   IntraVENous PRN    ondansetron (ZOFRAN-ODT) disintegrating tablet 4 mg  4 mg Oral Q8H PRN    Or    ondansetron (ZOFRAN) injection 4 mg  4 mg IntraVENous Q6H PRN    polyethylene glycol (GLYCOLAX) packet 17 g  17 g Oral Daily PRN    acetaminophen (TYLENOL) tablet 650 mg  650 mg Oral Q6H PRN    Or    acetaminophen (TYLENOL) suppository 650 mg  650 mg Rectal Q6H PRN    sodium bicarbonate tablet 650 mg  650 mg Oral TID    tamsulosin (FLOMAX) capsule 0.4 mg  0.4 mg Oral Daily    piperacillin-tazobactam (ZOSYN) 3,375 mg in sodium chloride 0.9 % 50 mL IVPB (mini-bag)  3,375 mg IntraVENous q8h       Signed:  Mann Madrid MD    Part of this note may have been written by using a voice dictation software. The note has been proof read but may still contain some grammatical/other typographical errors.

## 2022-06-18 NOTE — CONSULTS
[facility]  [address] [facility], [address]            Skye Polk is a 80 y.o. male who presents on referral from the inpatient service  I have seen the patient in the past approximately 3 years ago when we initially saw him for an embolic stroke secondary to atrial fibrillation subsequent to which she was on anticoagulation I am asked to see him on this occasion with reference to ongoing visual blurring particularly on the right. He has been hospitalized with a 40 pound weight loss is also had issues with GI diverticulitis which obliged him to discontinue the usage of Eliquis. The consideration has been raised by ophthalmology with regards to giant cell arteritis      Past Medical History:   Diagnosis Date    Acute CVA (cerebrovascular accident) ([address]     Cancer of prostate ([address]     radiation tx     CKD (chronic kidney disease) stage 3, GFR 30-59 ml/min ([address] 2012    Gout 5495    Metabolic syndrome     Mixed hyperlipidemia 2012    Moderate aortic valve stenosis     Peptic ulcer disease 2012    Unspecified essential hypertension 2012    Urinary retention        Past Surgical History:   Procedure Laterality Date    APPENDECTOMY  1950    CHOLECYSTECTOMY      COLONOSCOPY  2002    GASTRECTOMY      V&A,     TURP  ,     Dx CA, followed by radiation Rx        Family History   Problem Relation Age of Onset    Dementia Father         Social History     Socioeconomic History    Marital status:       Spouse name: Not on file    Number of children: Not on file    Years of education: Not on file    Highest education level: Not on file   Occupational History    Not on file   Tobacco Use    Smoking status: Former Smoker     Packs/day: 1.00     Quit date: 1960     Years since quittin.4    Smokeless tobacco: Never Used    Tobacco comment: Quit smoking: quit 60 years ago   Substance and Sexual Activity    Alcohol use: No    Drug use: No    Sexual activity: Not on file   Other Topics Concern    Not on file   Social History Narrative    Lives alone, , drives, two daughters he is close to, Samuel June [phone], Wilfredo Hernandez, close to both of them     Social Determinants of Health     Financial Resource Strain: Low Risk     Difficulty of Paying Living Expenses: Not hard at all   Food Insecurity: No Food Insecurity    Worried About [address] in the Last Year: Never true    [facility] in the Last Year: Never true   Transportation Needs:     Lack of Transportation (Medical): Not on file    Lack of Transportation (Non-Medical):  Not on file   Physical Activity:     Days of Exercise per Week: Not on file    Minutes of Exercise per Session: Not on file   Stress:     Feeling of Stress : Not on file   Social Connections:     Frequency of Communication with Friends and Family: Not on file    Frequency of Social Gatherings with Friends and Family: Not on file    Attends Qu[employer] Services: Not on file    Active Member of [address] [place] [address] or Organizations: Not on file    Attends Club or Organization Meetings: Not on file    Marital Status: Not on file   Intimate Partner Violence:     Fear of Current or Ex-Partner: Not on file    Emotionally Abused: Not on file    Physically Abused: Not on file    Sexually Abused: Not on file   Housing Stability:     Unable to Pay for Housing in the Last Year: Not on file    Number of [place] in the Last Year: Not on file    Unstable Housing in the Last Year: Not on file         Current Facility-Administered Medications   Medication Dose Route Frequency Provider Last Rate Last Admin    pantoprazole (PROTONIX) 40 mg in sodium chloride (PF) 10 mL injection  40 mg IntraVENous Daily Aman Kraus MD   40 mg at 06/18/22 0809    0.9 % sodium chloride infusion   IntraVENous Continuous Aman Kraus MD 75 mL/hr at 06/18/22 22 [facility] at 06/18/22 9753  methylPREDNISolone sodium (SOLU-MEDROL) 500 mg in dextrose 5 % 250 mL IVPB  500 mg IntraVENous Q12H Latha Rodriguez MD        atorvastatin (LIPITOR) tablet 40 mg  40 mg Oral Nightly Buzzy Ramp, DO   40 mg at 06/17/22 2237    traZODone (DESYREL) tablet 50 mg  50 mg Oral Nightly Buzzy Ramp, DO   50 mg at 06/17/22 2237    sodium chloride flush 0.9 % injection 5-40 mL  5-40 mL IntraVENous 2 times per day Buzzy Ramp, DO   10 mL at 06/18/22 0811    sodium chloride flush 0.9 % injection 5-40 mL  5-40 mL IntraVENous PRN Buzzy Ramp, DO        0.9 % sodium chloride infusion   IntraVENous PRN Buzzy Ramp, DO        ondansetron (ZOFRAN-ODT) disintegrating tablet 4 mg  4 mg Oral Q8H PRN Buzzy Ramp, DO        Or    ondansetron [facility]) injection 4 mg  4 mg IntraVENous Q6H PRN Buzzy Ramp, DO        polyethylene glycol (GLYCOLAX) packet 17 g  17 g Oral Daily PRN Buzzy Ramp, DO        acetaminophen (TYLENOL) tablet 650 mg  650 mg Oral Q6H PRN Buzzy Ramp, DO   650 mg at 06/18/22 3884    Or    acetaminophen (TYLENOL) suppository 650 mg  650 mg Rectal Q6H PRN Buzzy Ramp, DO        sodium bicarbonate tablet 650 mg  650 mg Oral TID Buzzy Ramp, DO   650 mg at 06/18/22 0808    tamsulosin (FLOMAX) capsule 0.4 mg  0.4 mg Oral Daily Buzzy Ramp, DO   0.4 mg at 06/18/22 0808    piperacillin-tazobactam (ZOSYN) 3,375 mg in sodium chloride 0.9 % 50 mL IVPB (mini-bag)  3,375 mg IntraVENous q8h Buzzy Ramp, DO 12.5 mL/hr at 06/18/22 1008 3,375 mg at 06/18/22 1008        Allergies   Allergen Reactions    Gabapentin Other (See Comments)     Dizzy.     Pt reports that he's now taking the medicine at night before bed - no longer having issues and is continuing to take this med.  9/22/2016       Review of Systems  Symptoms in terms of ongoing myalgias fatigue anorexia weight loss chronic shortness of breath poor balance diarrhea  See history of present illness  rest of the review of systems negative  /60   Pulse 81 Comment: NSR per monitor room  Temp 98.2 °F (36.8 °C) (Oral)   Resp 17   Ht 5' 7\" (1.702 m)   Wt 196 lb (88.9 kg)   SpO2 97%   BMI 30.70 kg/m²      Neurologic Exam  Fatigued elderly gentleman  Visual examination tracks a target with full visual fields there is no ptosis. Arcus senilis bilaterally pupillary light reflex intact no afferent pupillary defect some loss of visual acuity on the right and is inaccurate on finger counting bilaterally. There is no drift of the upper or lower extremities. Osteoarthritic changes are noted in the joints. there is proximal weakness noted in a general fashion    Most recent MRI   No results found for this or any previous visit. Most recent MRA   No results found for this or any previous visit. Most recent CTA  Results for orders placed during the hospital encounter of 06/12/22    CT HEAD WO CONTRAST    Narrative  Exam: CT head without contrast.  Indication: Photosensitivity, weight loss, weakness. Comparison: Head CT dated February 21, 2022. Multiple axial images obtained through the brain without intravenous contrast.  Radiation dose reduction techniques were used for this study: All CT scans  performed at this facility use one or all of the following: Automated exposure  control, adjustment of the mA and/or kVp according to patient's size, iterative  reconstruction. FINDINGS:  Diffuse cerebral atrophy with commensurate ex vacuo dilatation of the  ventricles. Scattered periventricular and centrum semiovale white matter  hypointensities most indicative of chronic ischemic white matter change. No  evidence of intracranial mass, hemorrhage, or large territorial infarct. The  basal cisterns are patent. No extra-axial fluid collection or mass effect. Bilateral lens replacements. The paranasal sinuses are clear. The mastoid air  cells and middle ears are clear.  No significant osseous or extracranial soft  tissue lesions. Impression  1. No evidence of acute intracranial abnormality. Chronic changes as above. Most recent Echo  No results found for this or any previous visit. Most recent lipid panels  Lab Results   Component Value Date    CHOL 98 11/19/2021    HDL 51 11/19/2021    VLDL 13 11/19/2021       Most recent Hgb A1C  No results found for: HBA1C, [id number]      Assessment/Plan:    Complex multi systemic disorder. Checking inflammatory indices with regards to giant cell arteritis. The management of this will be extraordinarily challenging given the presence of diverticulitis which will make the usage of any corticosteroid or autoimmune agent potentially quite hazardous.   Await the results of labs and will get an MRI of the brain in terms of excluding occipital lobe infarction as he is clearly with discontinuation of anticoagulants at risk for an embolic cerebrovascular event as well        Adriana Romero MD

## 2022-06-18 NOTE — PROGRESS NOTES
OCCUPATIONAL THERAPY Initial Assessment and Daily Note       OT Visit Days: 1  Acknowledge Orders  Time  OT Charge Capture  Rehab Caseload Tracker      Jolie Polk is a 80 y.o. male   PRIMARY DIAGNOSIS: Diverticulitis  Urinary retention [R33.9]  Diverticulitis [K57.92]  Diverticulosis [K57.90]  Diverticulitis of colon [K57.32]  Weight loss [R63.4]  Hypotension, unspecified hypotension type [I95.9]  Sepsis, due to unspecified organism, unspecified whether acute organ dysfunction present ([facility] [address] [A41.9]       Reason for Referral: Generalized Muscle Weakness (M62.81)  Other abnormalities of gait and mobility (R26.89)  Inpatient: Payor: Pauline Pearl / Plan: [employer] / Product Type: *No Product type* /     ASSESSMENT:     REHAB RECOMMENDATIONS:   Recommendation to date pending progress:  [address] [place] [address] Therapy    Equipment:     To Be Determined     ASSESSMENT:  Mr. Jaquelin Smith is a 79 y/o M presenting with diverticulitis. Pt supine in bed on entry with daughter present (not daughter that resides with pt). PLOF Mod I. Pt denies any falls in past 6 months or presence of any DME in home. Pt denied light headedness, dizziness, SOB or pain throughout session. Vitals monitored and WNL. Pt c/o dry lips, daughter reports pt using blistex to address. At end of session prior to therapist leaving pt and daughter reported decreased vision in R eye recently, both reported MD and NSG aware, disucssed with NSG and NSG and MD are aware. No abnormalities R vs L AROM/STR noted at present time Rest breaks utilized as needed throughout session. Pt presents with deficits in ADLs, functional t/fs, household mobility for ADLs and activity tolerance compared to reported PLOF. Pt tolerated session well. Pt presents pleasant and motivated with good rehab potential. Pt would benefit from continued skilled OT services for duration of hospital stay and after t/f to next level of care or until all stated goals met. [address] [facility] [address] [id number] [facility] 6 Clicks Daily Activity Inpatient Short Form:    AM-PAC Daily Activity Inpatient   How much help for putting on and taking off regular lower body clothing?: A Little  How much help for Bathing?: A Little  How much help for Toileting?: A Little  How much help for putting on and taking off regular upper body clothing?: None  How much help for taking care of personal grooming?: A Little  How much help for eating meals?: None  AM-[facility] Inpatient Daily Activity Raw Score: 20  AM-PAC Inpatient ADL T-Scale Score : 42.03  ADL Inpatient CMS 0-100% Score: 38.32  ADL Inpatient CMS G-Code Modifier : CJ           SUBJECTIVE:     Mr. David Duggan states, \"I'm not really in no pain\"     Social/Functional Lives With: Daughter  Type of Home: House  Home Layout: Able to Live on Main level with bedroom/bathroom  Home Access: Stairs to enter without rails  Entrance Stairs - Number of Steps: 4    OBJECTIVE:     Thien Moyer / Tito Thompson / AIRWAY: Mckeon Catheter and IV    RESTRICTIONS/PRECAUTIONS:       PAIN: VITALS / O2:   Pre Treatment:   Pain Assessment: None - Denies Pain      Post Treatment: None       Vitals   Vitals  Heart Rate: 68  Heart Rate Source: Monitor  BP: 115/68  BP Location: Left upper arm  Patient Position: Sitting      Oxygen  O2 Device: None (Room air)  Heart Rate: 68         GROSS EVALUATION: INTACT IMPAIRED   (See Comments)   UE AROM [x] []   UE PROM [x] []   Strength [x]       Posture / Balance [] Sitting - Static: Good  Sitting - Dynamic: Fair,+   Sensation [x]     Coordination [x]       Tone []  NT     Edema [] NT   Activity Tolerance [] Patient limited by fatigue     Hand Dominance R [x] L [x]      COGNITION/  PERCEPTION: INTACT IMPAIRED   (See Comments)   Orientation [x]     Vision []  [facility] for ADLs, pt and jodie present reported visual deficits recently onset in R eye, MD and NSG aware.    Hearing [x]     Cognition  [x]     Perception [x]       MOBILITY: I Mod I S SBA CGA Min Mod Max Total  NT x2 Comments:   Bed Mobility    Rolling [] [] [] [x] [] [] [] [] [] [] []    Supine to Sit [] [] [] [x] [] [] [] [] [] [] []    Scooting [] [] [] [] [x] [] [] [] [] [] []    Sit to Supine [] [] [] [] [] [] [] [] [] [] []    Transfers    Sit to Stand [] [] [] [] [] [] [] [] [] [x] []    Bed to Chair [] [] [] [] [] [] [] [] [] [x] []    Stand to Sit [] [] [] [] [] [] [] [] [] [x] []    Tub/Shower [] [] [] [] [] [] [] [] [] [x] []     Toilet [] [] [] [] [] [] [] [] [] [x] []      [] [] [] [] [] [] [] [] [] [] []    I=Independent, Mod I=Modified Independent, S=Supervision/Setup, SBA=Standby Assistance, CGA=Contact Guard Assistance, Min=Minimal Assistance, Mod=Moderate Assistance, Max=Maximal Assistance, Total=Total Assistance, NT=Not Tested    ACTIVITIES OF DAILY LIVING: I Mod I S SBA CGA Min Mod Max Total NT Comments   BASIC ADLs:              Upper Body Bathing  [] [] [] [x] [] [] [] [] [] []    Lower Body Bathing [] [] [] [] [x] [] [] [] [] []    Toileting [] [] [] [] [x] [] [] [] [] []    Upper Body Dressing [] [] [] [x] [] [] [] [] [] []    Lower Body Dressing [] [] [] [] [x] [] [] [] [] [] Doff/don socks   Feeding [] [x] [] [] [] [] [] [] [] []    Grooming [] [] [] [x] [] [] [] [] [] [] Face washing and hand hygiene seated EOB   Personal Device Care [] [] [] [] [] [] [] [] [] [x]    Functional Mobility [] [] [] [x] [x] [] [] [] [] [] CGA - SBA bed mobility   I=Independent, Mod I=Modified Independent, S=Supervision/Setup, SBA=Standby Assistance, CGA=Contact Guard Assistance, Min=Minimal Assistance, Mod=Moderate Assistance, Max=Maximal Assistance, Total=Total Assistance, NT=Not Tested    PLAN:     FREQUENCY/DURATION   OT Plan of Care: 3 times/week for duration of hospital stay or until stated goals are met, whichever comes first.    ACUTE OCCUPATIONAL THERAPY GOALS:   (Developed with and agreed upon by patient and/or caregiver.)  1.  Patient will complete lower body bathing and dressing with Mod I and adaptive equipment as   needed. 2. Patient will completed upper body bathing and dressing with Mod I and adaptive equipment as needed. 3. Patient will complete grooming standing at sink with Mod I and adaptive equipment as needed. 4. Patient will complete toileting with Mod I.   5. Patient will tolerate 30 minutes of OT treatment with no rest breaks to increase activity tolerance for ADLs. 6. Patient will complete functional transfers with Mod I and adaptive equipment as needed. Timeframe: 7 visits       PROBLEM LIST:   (Skilled intervention is medically necessary to address:)  Decreased ADL/Functional Activities  Decreased Activity Tolerance  Decreased Balance  Decreased Strength  Decreased Transfer Abilities   INTERVENTIONS PLANNED:  (Benefits and precautions of occupational therapy have been discussed with the patient.)  Self Care Training  Therapeutic Activity  Therapeutic Exercise/HEP  Neuromuscular Re-education  Manual Therapy  Education         TREATMENT:     EVALUATION: LOW COMPLEXITY: (Untimed Charge)    TREATMENT:   Self Care (10 minutes): Patient participated in lower body dressing and grooming ADLs in unsupported sitting with no   cueing to increase independence, decrease assistance required and increase activity tolerance. Patient also participated in energy conservation training to increase independence, decrease assistance required and increase activity tolerance. TREATMENT GRID:  N/A    AFTER TREATMENT PRECAUTIONS: Bed, Bed/Chair Locked, Call light within reach, Needs within reach, RN notified, Side rails x2 and Visitors at bedside    INTERDISCIPLINARY COLLABORATION:  RN/ PCT and OT/ ROGERS    EDUCATION:  Education Given To: Patient; Family  Education Provided: Role of Therapy;Plan of Care;Energy Conservation  Education Method: Verbal  Barriers to Learning: None  Education Outcome: Verbalized understanding    TOTAL TREATMENT DURATION AND TIME:  Time In: 1230  Time Out: [address]  Minutes: [address], OT

## 2022-06-18 NOTE — PROGRESS NOTES
END OF SHIFT NOTE:    INTAKE/OUTPUT  06/17 0701 - 06/18 0700  In: 740.5 [P.O.:480; I.V.:260.5]  Out: 1360 [Urine:1360]  Voiding: Yes  Catheter: Yes  Drain:   Urinary Catheter Mckeon (Active)   $ Urethral catheter insertion $ Not inserted for procedure 06/16/22 1336   Catheter Indications Urinary retention (acute or chronic), continuous bladder irrigation or bladder outlet obstruction 06/17/22 2000   Site Assessment Pink 06/17/22 2000   Urine Color Bloody;Brown;Gabriel 06/17/22 2000   Urine Appearance Sediment 06/17/22 2000   Collection Container Standard 06/17/22 2000   Securement Method Securing device (Describe) 06/17/22 2000   Catheter Care Completed Yes 06/17/22 2000   Catheter Best Practices  Drainage tube clipped to bed;Catheter secured to thigh; Tamper seal intact; Bag below bladder;Bag not on floor; Lack of dependent loop in tubing;Drainage bag less than half full 06/17/22 2000   Status Draining;Patent 06/17/22 2000   Output (mL) 300 mL 06/18/22 0512   Discontinuation Reason Per nurse-driven protocol 98/42/43 1336               Flatus: Patient Does have flatus present. Stool:  0 occurrences. Emesis: 0 occurrences. VITAL SIGNS  Patient Vitals for the past 12 hrs:   Temp Pulse Resp BP SpO2   06/18/22 0346 98.4 °F (36.9 °C) 75 18 110/69 96 %   06/18/22 0242 98.4 °F (36.9 °C) 75 18 110/69 96 %   06/17/22 2339 -- 74 -- -- --   06/17/22 2256 98.8 °F (37.1 °C) 67 18 (!) 107/51 96 %   06/17/22 2057 -- -- -- (!) 100/53 --   06/17/22 2014 98.4 °F (36.9 °C) 64 18 (!) 95/52 97 %   06/17/22 1905 -- 64 -- -- 97 %         Ambulating  Yes    Shift report given to oncoming nurse at the bedside.     Aung Ortiz RN

## 2022-06-19 PROBLEM — H46.9 OPTIC NEUROPATHY, BILATERAL: Status: ACTIVE | Noted: 2022-01-01

## 2022-06-19 PROBLEM — H53.8 BLURRY VISION: Status: ACTIVE | Noted: 2022-01-01

## 2022-06-19 PROBLEM — H34.231: Status: ACTIVE | Noted: 2022-01-01

## 2022-06-19 NOTE — PROGRESS NOTES
Hospitalist Progress Note   Admit Date:  2022 10:38 AM   Name:  Yumiko Robles   Age:  80 y.o. Sex:  male  :  1932   MRN:  [id number]   Room:      Presenting Complaint: Hypotension     Reason(s) for Admission: Urinary retention [R33.9]  Diverticulitis [K57.92]  Diverticulosis [K57.90]  Diverticulitis of colon [K57.32]  Weight loss [R63.4]  Hypotension, unspecified hypotension type [I95.9]  Sepsis, due to unspecified organism, unspecified whether acute organ dysfunction present [facility]) [A41.9]     Hospital Course & Interval History:   Please refer to the admission H&P for details of presentation. In summary, Yumiko Robles is a 80 y.o. male with medical history significant for prostate cancer s/p radiation, CVA, CKD stage 3, HLD, known urinary retention but does not wear a Mckeon at home, aortic valve stenosis, and irregular heart rhythm on low dose Eliquis who presented with cc generalized weakness and 40 pound weight loss for the past two months. Hypotensive with dark red blood per rectum on admission. Stool occult positive. CT C/A/P showed no evidence of malignancy but with extensive colonic diverticulosis with mild acute diverticulitis involving the descending colon, severely distended urinary bladder with right hydroureteronephrosis, b/l renal atrophy with 3mm non obstructing right renal stone. Subjective/24 hr Events (22) : Patient is seen and examined at bedside. No acute events reported overnight by nursing staff. No BM in 24hrs. Tolerating diet but states still with lack of taste except for grits yesterday AM.   Still with right eye blurriness/reduced vision. Left eye is ok. Patient denies fever, chills, chest pains, shortness of breath, n/v/, abdominal pain. Review of Systems: 10 point review of systems is otherwise negative with the exception of the elements mentioned above.     Assessment & Plan:     Diverticulitis with GI hemorrhage  CT A/P reviewed. 06/19/22: Diarrhea resolved. Blood culture with no growth for 3 day  -Continue with Zosyn (6/16 - )    Acute blood loss anemia due to GI bleed from diverticulitis  Hemoglobin of 11.9 on admission with Hb of 12.6 on 6/12.    06/19/22: Hemoglobin of 10.4 today. 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 Concerning for GCS.   06/19/22: Continues to have vision blindness of right eye. MRI brain reviewed. No acute infarction. Elevated ESR and CRP  - appreciate Neurology and Ophthalmology recommendations  -Neurology consulted  -IV Solumedrol 500mg BID x 3 days (EOT 6/20) followed by prednisone 1mg/kg  -Surgery consult for Temporal Artery biopsy    MIKO on CKD3 due to urinary retention from obstructive uropathy  Rght hydroureteronephrosis with 3mm non obstructing right renal stone  Prostate Ca s/p radiation   S/p Corrigan Catheter insertion  Known urinary retention without corrigan at home. 06/19/22: Renal function not improving. Cr still elevated. - likely will need to go with corrigan catheter and outpatient urology follow up  - on flomax   - will continue maintenance fluids     Failure to thrive in adult  Weight loss X 2 months. CT C/A/P without any signs of malignancy  - nutrition consulted    Irregular cardiac rhythm  EKG reviewed. Currently on sinus.  - hold eliquis give bleeding.  May need to restart if Hb stable given stroke risk  - will keep him on telemetry    Metabolic acidosis likely due to MIKO  Bicarb of 15 on admission  - continue on sodium bicarb tablets  - monitor BMP    Hypertension // HLD   - resume home lipitor  - hold home benazapril and lasix due to MIKO    Dispo: Home when medically stable    Diet:  ADULT ORAL NUTRITION SUPPLEMENT; Breakfast; Standard High Calorie/High Protein Oral Supplement  ADULT ORAL NUTRITION SUPPLEMENT; Lunch, Dinner; Frozen Oral Supplement  ADULT DIET; Regular; Low Fat/Low Chol/High Fiber/2 gm Na; Grits for breakfast please! DVT PPx: SDCs  Code status: DNR        I have reviewed and ordered clinical lab tests and independently visualized images, tracing. I spent 31 minutes of time caring for this patient at bedside or nearby, more than 50 percent of which was spent on coordination of care and/or counseling regarding the disease process, treatment options, and treatment plan. Discussed with ophthalmology. Discussed with patient and patient's daughter at bedside. Hospital Problems:  Principal Problem:    Diverticulitis  Active Problems:    Diverticulosis    Failure to thrive in adult    Hydroureteronephrosis    Anemia    Gastrointestinal hemorrhage associated with intestinal diverticulitis    Renal calculus, right    Prostate cancer (HCC)    S/P radiation therapy    Metabolic acidosis    Mckeon catheter in place    DNR (do not resuscitate)    Blurry vision    Occlusion of right cilioretinal artery    Optic neuropathy, bilateral    Irregular cardiac rhythm    Acute kidney injury superimposed on chronic kidney disease ([address]    Hypertension    Mixed hyperlipidemia    Stage 3 chronic kidney disease (HCC)    Aortic stenosis, moderate    Urinary retention  Resolved Problems:    * No resolved hospital problems.  *      Objective:     Patient Vitals for the past 24 hrs:   Temp Pulse Resp BP SpO2   06/19/22 0542 97.9 °F (36.6 °C) 51 18 (!) 148/67 95 %   06/19/22 0330 -- -- -- 139/68 --   06/19/22 0318 97.5 °F (36.4 °C) 54 18 (!) 150/72 96 %   06/19/22 0000 -- -- -- 135/69 --   06/18/22 2356 97.7 °F (36.5 °C) 64 18 (!) 158/63 95 %   06/18/22 2000 97.7 °F (36.5 °C) 65 18 138/74 100 %   06/18/22 1908 97.7 °F (36.5 °C) 65 18 138/74 100 %   06/18/22 1516 98.8 °F (37.1 °C) 60 18 116/70 95 %   06/18/22 1230 -- 68 -- 115/68 --   06/18/22 1146 98.2 °F (36.8 °C) 70 17 115/65 98 %       Oxygen Therapy  SpO2: 95 %  O2 Device: None (Room air)    Estimated body mass index is 32.31 kg/m² as calculated from the following:    Height as of this encounter: 5' 7\" (1.702 m). Weight as of this encounter: 206 lb 4.8 oz (93.6 kg). Intake/Output Summary (Last 24 hours) at 6/19/2022 1032  Last data filed at 6/19/2022 0915  Gross per 24 hour   Intake 86689. 11 ml   Output 2525 ml   Net 9936.11 ml         Physical Exam:     Blood pressure (!) 148/67, pulse 51, temperature 97.9 °F (36.6 °C), temperature source Oral, resp. rate 18, height 5' 7\" (1.702 m), weight 206 lb 4.8 oz (93.6 kg), SpO2 95 %. General:    Well nourished. Alert and awake, ill appearing  Head:  Normocephalic, atraumatic  Eyes:  Sclerae appear normal.  Pupils equally round. ENT:  Nares appear normal, no drainage. Moist oral mucosa  Neck:  No restricted ROM. Trachea midline   CV:   RRR. No jugular venous distension. Lungs:   CTAB. No wheezing  Respirations even, unlabored  Abdomen: Bowel sounds present. Soft, nontender, nondistended. Extremities: No cyanosis or clubbing. No edema  Skin:     No rashes and normal coloration. Warm and dry. Neuro:  CN II-XII grossly intact. A&Ox3  Psych:  Normal mood and affect.       I have reviewed ordered lab tests and independently visualized imaging below:    Recent Labs:  Recent Results (from the past 48 hour(s))   PLEASE READ & DOCUMENT PPD TEST IN 24 HRS    Collection Time: 06/17/22  4:46 PM   Result Value Ref Range    PPD, (POC) Negative Negative    mm Induration 0 0 - 5 mm   PLEASE READ & DOCUMENT PPD TEST IN 48 HRS    Collection Time: 06/18/22 12:00 AM   Result Value Ref Range    PPD, (POC) Negative Negative    mm Induration 0 0 - 5 mm   CBC    Collection Time: 06/18/22  6:43 AM   Result Value Ref Range    WBC 12.2 (H) 4.3 - 11.1 K/uL    RBC 3.09 (L) 4.23 - 5.6 M/uL    Hemoglobin 9.9 (L) 13.6 - 17.2 g/dL    Hematocrit 31.2 (L) 41.1 - 50.3 %    .0 (H) 79.6 - 97.8 FL    MCH 32.0 26.1 - 32.9 PG    MCHC 31.7 31.4 - 35.0 g/dL    RDW 15.9 (H) 11.9 - 14.6 %    Platelets 235 (L) 861 - 450 K/uL    MPV 12.5 (H) 9.4 - 12.3 FL    nRBC 0.00 0.0 - 0.2 K/uL   Basic Metabolic Panel    Collection Time: 06/18/22  6:43 AM   Result Value Ref Range    Sodium 142 136 - 145 mmol/L    Potassium 4.8 3.5 - 5.1 mmol/L    Chloride 116 (H) 98 - 107 mmol/L    CO2 19 (L) 21 - 32 mmol/L    Anion Gap 7 7 - 16 mmol/L    Glucose 102 (H) 65 - 100 mg/dL    BUN 33 (H) 8 - 23 MG/DL    CREATININE 2.20 (H) 0.8 - 1.5 MG/DL    GFR  36 (L) >60 ml/min/1.73m2    GFR Non- 30 (L) >60 ml/min/1.73m2    Calcium 9.6 8.3 - 10.4 MG/DL   Transferrin Saturation    Collection Time: 06/18/22  6:43 AM   Result Value Ref Range    Iron 15 (L) 35 - 150 ug/dL    TIBC 315 250 - 450 ug/dL    TRANSFERRIN SATURATION 5 (L) >20 %   Vitamin B12    Collection Time: 06/18/22  6:43 AM   Result Value Ref Range    Vitamin B-12 468 193 - 986 pg/mL   Ferritin    Collection Time: 06/18/22  6:43 AM   Result Value Ref Range    Ferritin 82 8 - 388 NG/ML   Folate    Collection Time: 06/18/22  6:43 AM   Result Value Ref Range    Folate 5.6 3.1 - 17.5 ng/mL   C-Reactive Protein    Collection Time: 06/18/22 11:29 AM   Result Value Ref Range    CRP 7.1 (H) 0.0 - 0.9 mg/dL   Sedimentation Rate    Collection Time: 06/18/22 11:29 AM   Result Value Ref Range    Sed Rate, Automated 18 (H) 15 mm/hr   CBC    Collection Time: 06/19/22  7:18 AM   Result Value Ref Range    WBC 7.8 4.3 - 11.1 K/uL    RBC 3.26 (L) 4.23 - 5.6 M/uL    Hemoglobin 10.4 (L) 13.6 - 17.2 g/dL    Hematocrit 32.7 (L) 41.1 - 50.3 %    .3 (H) 79.6 - 97.8 FL    MCH 31.9 26.1 - 32.9 PG    MCHC 31.8 31.4 - 35.0 g/dL    RDW 15.7 (H) 11.9 - 14.6 %    Platelets 399 (L) 575 - 450 K/uL    MPV 12.8 (H) 9.4 - 12.3 FL    nRBC 0.00 0.0 - 0.2 K/uL   Basic Metabolic Panel    Collection Time: 06/19/22  7:18 AM   Result Value Ref Range    Sodium 140 136 - 145 mmol/L    Potassium 4.3 3.5 - 5.1 mmol/L    Chloride 114 (H) 98 - 107 mmol/L    CO2 17 (L) 21 - 32 mmol/L    Anion Gap 9 7 - 16 mmol/L    Glucose 210 (H) 65 - 100 mg/dL    BUN 26 (H) 8 - 23 MG/DL    CREATININE 1.80 (H) 0.8 - 1.5 MG/DL    GFR  46 (L) >60 ml/min/1.73m2    GFR Non- 38 (L) >60 ml/min/1.73m2    Calcium 9.6 8.3 - 10.4 MG/DL       Other Studies:  MRI BRAIN W WO CONTRAST   Final Result      1. No acute infarction. 2.  Old lacunar infarct in the left corona radiata. 3.  White matter findings compatible with moderate chronic small vessel ischemic   disease. CT CHEST ABDOMEN PELVIS WO CONTRAST   Final Result      1. No evidence of malignancy in the chest, abdomen, or pelvis by noncontrast CT. 2. Extensive colonic diverticulosis with mild acute diverticulitis involving the   descending colon. 3. Severely distended urinary bladder with severe right hydroureteronephrosis,   possibly resulting from the presumed chronic bladder outlet obstruction. No   obstructing etiology identified at the UVJ. 4. Bilateral renal atrophy with 3 mm nonobstructing right renal calculus. XR CHEST PORTABLE   Final Result      1. Shallow inspiratory changes.       CPT code(s) 69337                      Current Meds:  Current Facility-Administered Medications   Medication Dose Route Frequency    pantoprazole (PROTONIX) 40 mg in sodium chloride (PF) 10 mL injection  40 mg IntraVENous Daily    methylPREDNISolone sodium (SOLU-MEDROL) 500 mg in dextrose 5 % 250 mL IVPB  500 mg IntraVENous Q12H    sodium chloride flush 0.9 % injection 10 mL  10 mL IntraVENous PRN    atorvastatin (LIPITOR) tablet 40 mg  40 mg Oral Nightly    traZODone (DESYREL) tablet 50 mg  50 mg Oral Nightly    sodium chloride flush 0.9 % injection 5-40 mL  5-40 mL IntraVENous 2 times per day    sodium chloride flush 0.9 % injection 5-40 mL  5-40 mL IntraVENous PRN    0.9 % sodium chloride infusion   IntraVENous PRN    ondansetron (ZOFRAN-ODT) disintegrating tablet 4 mg  4 mg Oral Q8H PRN    Or    ondansetron (ZOFRAN) injection 4 mg  4 mg IntraVENous Q6H PRN    polyethylene glycol (GLYCOLAX) packet 17 g  17 g Oral Daily PRN    acetaminophen (TYLENOL) tablet 650 mg  650 mg Oral Q6H PRN    Or    acetaminophen (TYLENOL) suppository 650 mg  650 mg Rectal Q6H PRN    sodium bicarbonate tablet 650 mg  650 mg Oral TID    tamsulosin (FLOMAX) capsule 0.4 mg  0.4 mg Oral Daily    piperacillin-tazobactam (ZOSYN) 3,375 mg in sodium chloride 0.9 % 50 mL IVPB (mini-bag)  3,375 mg IntraVENous q8h       Signed:  Latha Rodriguez MD    Part of this note may have been written by using a voice dictation software. The note has been proof read but may still contain some grammatical/other typographical errors.

## 2022-06-20 NOTE — PROGRESS NOTES
PHYSICAL THERAPY Daily Note and PM  (Link to Caseload Tracking: PT Visit Days : 2  Time In/Out PT Charge Capture  Rehab Caseload Tracker  Orders      Mabel Tompkins is a 80 y.o. male   PRIMARY DIAGNOSIS: Diverticulitis  Urinary retention [R33.9]  Diverticulitis [K57.92]  Diverticulosis [K57.90]  Diverticulitis of colon [K57.32]  Weight loss [R63.4]  Hypotension, unspecified hypotension type [I95.9]  Sepsis, due to unspecified organism, unspecified whether acute organ dysfunction present ([facility] [address] [A41.9]       Inpatient: Payor: Amanda Lyles / Plan: [employer] / Product Type: *No Product type* /     ASSESSMENT:     REHAB RECOMMENDATIONS:   Recommendation to date pending progress:  Setting:   No further skilled therapy after discharge from hospital    Equipment:     To Be Determined     ASSESSMENT:  Mr. Molly Arvizu is sitting in the recliner and agreeable to therapy. Mobility is slow and slightly guarded. Sit to stand with min assist to the walker. Gait training with rolling walker x 80 feet with slow justin. Patient is returned to the room and to supine in bed, bed mobility is independent as the patient is able to roll for replacing his brief, patient is able to position himself in the bed. Needs within reach and alarm intact. Pt improving with ambulation. Making progress.  No further therapy at d/c     SUBJECTIVE:   Mr. Molly Arvizu states, \"Hello\"     Social/Functional Lives With: Daughter  Type of Home: House  Home Layout: Able to Live on Main level with bedroom/bathroom  Home Access: Stairs to enter without rails  Entrance Stairs - Number of Steps: 4  OBJECTIVE:     PAIN: Caterina Kand / O2: PRECAUTION / Velton Carota / Quinn Goodwin:   Pre Treatment: 0/10         Post Treatment: 0/10 Vitals        Oxygen    IV    RESTRICTIONS/PRECAUTIONS:        MOBILITY: I Mod I S SBA CGA Min Mod Max Total  NT x2 Comments:   Bed Mobility    Rolling [] [] [x] [] [] [] [] [] [] [] []    Supine to Sit [] [] [] [] [] [] [] [] [] [x] [] Scooting [] [] [x] [] [] [] [] [] [] [] []    Sit to Supine [] [] [x] [] [] [] [] [] [] [] []    Transfers    Sit to Stand [] [] [] [] [] [x] [] [] [] [] []    Bed to Chair [] [] [] [] [] [] [] [] [] [x] []    Stand to Sit [] [] [] [] [] [x] [] [] [] [] []     [] [] [] [] [] [] [] [] [] [] []    I=Independent, Mod I=Modified Independent, S=Supervision, SBA=Standby Assistance, CGA=Contact Guard Assistance,   Min=Minimal Assistance, Mod=Moderate Assistance, Max=Maximal Assistance, Total=Total Assistance, NT=Not Tested    BALANCE: Good Fair+ Fair Fair- Poor NT Comments   Sitting Static [x] [] [] [] [] []    Sitting Dynamic [x] [] [] [] [] []              Standing Static [] [x] [] [] [] []    Standing Dynamic [] [x] [] [] [] []      GAIT: I Mod I S SBA CGA Min Mod Max Total  NT x2 Comments:   Level of Assistance [] [] [] [] [x] [] [] [] [] [] []    Distance   80 feet    DME Rolling Walker    Gait Quality Decreased justin , Decreased step clearance, Decreased step length, Decreased stance and Shuffling     Weightbearing Status      Stairs      I=Independent, Mod I=Modified Independent, S=Supervision, SBA=Standby Assistance, CGA=Contact Guard Assistance,   Min=Minimal Assistance, Mod=Moderate Assistance, Max=Maximal Assistance, Total=Total Assistance, NT=Not Tested    PLAN:   ACUTE PHYSICAL THERAPY GOALS:   (Developed with and agreed upon by patient and/or caregiver.)  (1.) Anthony Jacob will perform bed mobility with INDEPENDENCE within 5 treatment day(s). (2.) Anthony Jacob will transfer from bed to chair and chair to bed with INDEPENDENCE using the least restrictive device within 5 treatment day(s). (3.) Anthony Jacob will ambulate with INDEPENDENCE for 250 feet with the least restrictive device within 5 treatment day(s).    (4.) Anthony Jacob will ascend and descend 4 stairs using either hand rail(s) with MODIFIED INDEPENDENCE to improve functional mobility and safety within 5 treatment day(s). (5.) Yumi Leigh will perform bilateral lower extremity exercises x 15 min for HEP with SUPERVISION to improve strength, endurance, and functional mobility within 5 treatment day(s). FREQUENCY AND DURATION: 3 times/week for duration of hospital stay or until stated goals are met, whichever comes first.    TREATMENT:   TREATMENT:   Therapeutic Activity (10 Minutes): Therapeutic activity included Rolling, Scooting, Transfer Training, Ambulation on level ground, Sitting balance  and Standing balance to improve functional Activity tolerance, Balance, Coordination, Mobility and Strength.     TREATMENT GRID:  N/A    AFTER TREATMENT PRECAUTIONS: Alarm Activated, Bed, Bed/Chair Locked, Call light within reach, Needs within reach and RN notified    INTERDISCIPLINARY COLLABORATION:  RN/ PCT and PT/ PTA    EDUCATION:      TIME IN/OUT:  Time In: 1336  Time Out: 1403 [facility]  Minutes: 10    Gay Peters PTA

## 2022-06-20 NOTE — PROGRESS NOTES
LOS 4d    Chart reviewed by [facility] and discussed in IDR. Generally surgery consulted for temporal artery BX, recommending f/up outpatient. PT/OT recommending home health after discharge. Patient potential discharge for 06/21. CM following.

## 2022-06-20 NOTE — PROGRESS NOTES
Physician Progress Note      PATIENT:               Swati Saha  CSN #:                  [id number]  :                       1932  ADMIT DATE:       2022 10:38 AM  DISCH DATE:  RESPONDING  PROVIDER #:        Rogelio Jordan MD          QUERY TEXT:    Patient admitted with sepsis, diverticulitis . If possible, please document in   progress notes and discharge summary if you are evaluating and /or treating   any of the following: The medical record reflects the following:  Risk Factors: history of CKD, prostate cancer, 40 lb weight loss in last 2   months  Clinical Indicators:  Weight Loss:  Greater than 7.5% over 3  months (8% loss   over 2 mos)   Body Fat Loss:  Mild  body fat loss Triceps  Muscle Mass Loss:  Mild muscle mass loss Temples   (temporalis),Hand (interosseous),  Registered Dietician consult note: \"Moderate malnutrition\"  Treatment: Nutritional consult, Oral Nutrition Supplement    ASPEN Criteria:    https://aspenjournals. onlinelibrary. vale. com/doi/full/10.1177/457438252495349  5  email: [email or web address]  Options provided:  -- Protein calorie malnutrition mild  -- Protein calorie malnutrition moderate  -- Protein calorie malnutrition severe  -- Other - I will add my own diagnosis  -- Disagree - Not applicable / Not valid  -- Disagree - Clinically unable to determine / Unknown  -- Refer to Clinical Documentation Reviewer    PROVIDER RESPONSE TEXT:    This patient has severe protein calorie malnutrition.     Query created by: Dari Vasquez on 2022 9:35 AM      Electronically signed by:  Rogelio Jordan MD 2022 11:06 AM

## 2022-06-20 NOTE — PROGRESS NOTES
Hospitalist Progress Note   Admit Date:  2022 10:38 AM   Name:  Gauri Sheriff   Age:  80 y.o. Sex:  male  :  1932   MRN:  [id number]   Room:      Presenting Complaint: Hypotension     Reason(s) for Admission: Urinary retention [R33.9]  Diverticulitis [K57.92]  Diverticulosis [K57.90]  Diverticulitis of colon [K57.32]  Weight loss [R63.4]  Hypotension, unspecified hypotension type [I95.9]  Sepsis, due to unspecified organism, unspecified whether acute organ dysfunction present [facility]) [A41.9]     Hospital Course & Interval History:   Please refer to the admission H&P for details of presentation. In summary, Gauri Sheriff is a 80 y.o. male with medical history significant for prostate cancer s/p radiation, CVA, CKD stage 3, HLD, known urinary retention but does not wear a Mckeon at home, aortic valve stenosis, and irregular heart rhythm on low dose Eliquis who presented with cc generalized weakness and 40 pound weight loss for the past two months. Hypotensive with dark red blood per rectum on admission. Stool occult positive. CT C/A/P showed no evidence of malignancy but with extensive colonic diverticulosis with mild acute diverticulitis involving the descending colon, severely distended urinary bladder with right hydroureteronephrosis, b/l renal atrophy with 3mm non obstructing right renal stone. Patient complains of blurry vision in the right eye after seeing his ophthalmologist little over a week ago. On morning of , right vision blurriness worsened. 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 Patient was started on high-dose IV steroids. MRI of the brain was obtained and did not show any acute infarction. Subjective/24 hr Events (22) : Patient is seen and examined at bedside.   No acute events reported overnight by nursing staff. Reports no abdominal pain, nausea vomiting. Tolerating p.o. diet. 1 bowel movement on 6/19. Continues to have blurry vision on the right eye. Patient denies fever, chills, chest pains, shortness of breath, n/v/, abdominal pain. Review of Systems: 10 point review of systems is otherwise negative with the exception of the elements mentioned above. Assessment & Plan:     Diverticulitis with GI hemorrhage  CT A/P reviewed. 06/20/22: Diarrhea resolved. Blood culture with no growth for 3 day  -dc with Zosyn (6/16 - ). Will start him on Augmentin tomorrow for 2 more days. Acute blood loss anemia due to GI bleed from diverticulitis  Hemoglobin of 11.9 on admission with Hb of 12.6 on 6/12.    06/20/22: Hemoglobin of 10.7 today. 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 Concerning for GCS. MRI brain reviewed. No acute infarction. Elevated ESR and CRP   06/20/22: Continues to have vision blindness of right eye.   - appreciate Neurology and Ophthalmology recommendations  -IV Solumedrol 500mg BID x 3 days (EOT 6/20) followed by prednisone 1mg/kg  -Surgery consult for Temporal Artery biopsy. Recommended outpatient follow up. MIKO on CKD3 due to urinary retention from obstructive uropathy  Rght hydroureteronephrosis with 3mm non obstructing right renal stone  Prostate Ca s/p radiation   S/p Corrigan Catheter insertion  Known urinary retention without corrigan at home.   06/20/22: Renal function not improving. Cr still elevated. Discussed with recurrent Corrigan catheter. Patient would like to try a trial of voiding. Agreed that if he is unable to void he will have to have Corrigan upon discharge. Patient will need outpatient urology follow-up. -Start patient on Proscar.   Already on Flomax    Failure to thrive in adult  Weight loss X 2 months. CT C/A/P without any signs of malignancy  - nutrition consulted    Irregular cardiac rhythm  EKG reviewed. Currently on sinus.  - will restart eliquis  - will keep him on telemetry    Metabolic acidosis likely due to MIKO  Bicarb of 15 on admission  - continue on sodium bicarb tablets  - monitor BMP    Hypertension // HLD   - resume home lipitor  - hold home benazapril and lasix due to MIKO    Dispo: Home when medically stable    Diet:  ADULT ORAL NUTRITION SUPPLEMENT; Breakfast; Standard High Calorie/High Protein Oral Supplement  ADULT ORAL NUTRITION SUPPLEMENT; Lunch, Dinner; Frozen Oral Supplement  ADULT DIET; Regular; Low Fat/Low Chol/High Fiber/2 gm Na; Grits for breakfast please! DVT PPx: SDCs  Code status: DNR        I have reviewed and ordered clinical lab tests and independently visualized images, tracing. I spent 30 minutes of time caring for this patient at bedside or nearby, more than 50 percent of which was spent on coordination of care and/or counseling regarding the disease process, treatment options, and treatment plan. Discussed with ophthalmology. Discussed with patient and patient's daughter at bedside. Hospital Problems:  Principal Problem:    Diverticulitis  Active Problems:    Diverticulosis    Failure to thrive in adult    Hydroureteronephrosis    Anemia    Gastrointestinal hemorrhage associated with intestinal diverticulitis    Renal calculus, right    Prostate cancer (HCC)    S/P radiation therapy    Metabolic acidosis    Mckeon catheter in place    DNR (do not resuscitate)    Blurry vision    Occlusion of right cilioretinal artery    Optic neuropathy, bilateral    Irregular cardiac rhythm    Acute kidney injury superimposed on chronic kidney disease ([address]    Hypertension    Mixed hyperlipidemia    Stage 3 chronic kidney disease (HCC)    Aortic stenosis, moderate    Urinary retention  Resolved Problems:    * No resolved hospital problems. *      Objective:     Patient Vitals for the past 24 hrs:   Temp Pulse Resp BP SpO2   06/20/22 1047 97.8 °F (36.6 °C) 55 18 124/62 98 %   06/20/22 0707 97.9 °F (36.6 °C) 62 20 (!) 140/74 96 %   06/20/22 0321 98.3 °F (36.8 °C) 63 18 (!) 145/84 96 %   06/20/22 0256 98.2 °F (36.8 °C) 63 18 137/75 96 %   06/19/22 2338 97.9 °F (36.6 °C) 59 17 (!) 148/68 94 %   06/19/22 2315 97.9 °F (36.6 °C) 59 17 (!) 148/68 94 %   06/19/22 2000 98.1 °F (36.7 °C) 69 19 (!) 142/63 95 %   06/19/22 1905 -- 69 -- -- 95 %   06/19/22 1815 98.1 °F (36.7 °C) 69 19 (!) 142/63 95 %   06/19/22 1501 97.7 °F (36.5 °C) 53 18 (!) 140/56 95 %       Oxygen Therapy  SpO2: 98 %  O2 Device: None (Room air)    Estimated body mass index is 32 kg/m² as calculated from the following:    Height as of this encounter: 5' 7\" (1.702 m). Weight as of this encounter: 204 lb 4.8 oz (92.7 kg). Intake/Output Summary (Last 24 hours) at 6/20/2022 1315  Last data filed at 6/20/2022 1020  Gross per 24 hour   Intake 1713.44 ml   Output 1425 ml   Net 288.44 ml         Physical Exam:     Blood pressure 124/62, pulse 55, temperature 97.8 °F (36.6 °C), temperature source Oral, resp. rate 18, height 5' 7\" (1.702 m), weight 204 lb 4.8 oz (92.7 kg), SpO2 98 %. General:    Well nourished. Alert and awake, ill appearing  Head:  Normocephalic, atraumatic  Eyes:  Sclerae appear normal.  Pupils equally round. ENT:  Nares appear normal, no drainage. Moist oral mucosa  Neck:  No restricted ROM. Trachea midline   CV:   RRR. No jugular venous distension. Lungs:   CTAB. No wheezing  Respirations even, unlabored  Abdomen: Bowel sounds present. Soft, nontender, nondistended. Extremities: No cyanosis or clubbing. No edema  Skin:     No rashes and normal coloration. Warm and dry. Neuro:  CN II-XII grossly intact. A&Ox3  Psych:  Normal mood and affect.       I have reviewed ordered lab tests and independently visualized imaging below:    Recent Labs:  Recent Results (from the past 48 hour(s))   CBC    Collection Time: 06/19/22  7:18 AM   Result Value Ref Range    WBC 7.8 4.3 - 11.1 K/uL    RBC 3.26 (L) 4.23 - 5.6 M/uL    Hemoglobin 10.4 (L) 13.6 - 17.2 g/dL    Hematocrit 32.7 (L) 41.1 - 50.3 %    .3 (H) 79.6 - 97.8 FL    MCH 31.9 26.1 - 32.9 PG    MCHC 31.8 31.4 - 35.0 g/dL    RDW 15.7 (H) 11.9 - 14.6 %    Platelets 098 (L) 281 - 450 K/uL    MPV 12.8 (H) 9.4 - 12.3 FL    nRBC 0.00 0.0 - 0.2 K/uL   Basic Metabolic Panel    Collection Time: 06/19/22  7:18 AM   Result Value Ref Range    Sodium 140 136 - 145 mmol/L    Potassium 4.3 3.5 - 5.1 mmol/L    Chloride 114 (H) 98 - 107 mmol/L    CO2 17 (L) 21 - 32 mmol/L    Anion Gap 9 7 - 16 mmol/L    Glucose 210 (H) 65 - 100 mg/dL    BUN 26 (H) 8 - 23 MG/DL    CREATININE 1.80 (H) 0.8 - 1.5 MG/DL    GFR  46 (L) >60 ml/min/1.73m2    GFR Non- 38 (L) >60 ml/min/1.73m2    Calcium 9.6 8.3 - 10.4 MG/DL   PLEASE READ & DOCUMENT PPD TEST IN 72 HRS    Collection Time: 06/19/22  4:46 PM   Result Value Ref Range    PPD, (POC) Negative Negative    mm Induration 0 0 - 5 mm   CBC    Collection Time: 06/20/22  6:40 AM   Result Value Ref Range    WBC 11.5 (H) 4.3 - 11.1 K/uL    RBC 3.30 (L) 4.23 - 5.6 M/uL    Hemoglobin 10.7 (L) 13.6 - 17.2 g/dL    Hematocrit 33.0 (L) 41.1 - 50.3 %    .0 (H) 79.6 - 97.8 FL    MCH 32.4 26.1 - 32.9 PG    MCHC 32.4 31.4 - 35.0 g/dL    RDW 15.6 (H) 11.9 - 14.6 %    Platelets 589 638 - 605 K/uL    MPV 12.4 (H) 9.4 - 12.3 FL    nRBC 0.02 0.0 - 0.2 K/uL   Basic Metabolic Panel    Collection Time: 06/20/22  6:40 AM   Result Value Ref Range    Sodium 140 136 - 145 mmol/L    Potassium 3.9 3.5 - 5.1 mmol/L    Chloride 112 (H) 98 - 107 mmol/L    CO2 20 (L) 21 - 32 mmol/L    Anion Gap 8 7 - 16 mmol/L    Glucose 171 (H) 65 - 100 mg/dL    BUN 32 (H) 8 - 23 MG/DL    CREATININE 1.80 (H) 0.8 - 1.5 MG/DL    GFR  46 (L) >60 ml/min/1.73m2    GFR Non- 38 (L) >60 ml/min/1.73m2    Calcium 9.7 8.3 - 10.4 MG/DL       Other Studies:  MRI BRAIN W WO CONTRAST   Final Result      1. No acute infarction. 2.  Old lacunar infarct in the left corona radiata. 3.  White matter findings compatible with moderate chronic small vessel ischemic   disease. CT CHEST ABDOMEN PELVIS WO CONTRAST   Final Result      1. No evidence of malignancy in the chest, abdomen, or pelvis by noncontrast CT. 2. Extensive colonic diverticulosis with mild acute diverticulitis involving the   descending colon. 3. Severely distended urinary bladder with severe right hydroureteronephrosis,   possibly resulting from the presumed chronic bladder outlet obstruction. No   obstructing etiology identified at the UVJ. 4. Bilateral renal atrophy with 3 mm nonobstructing right renal calculus. XR CHEST PORTABLE   Final Result      1. Shallow inspiratory changes.       CPT code(s) 77189                      Current Meds:  Current Facility-Administered Medications   Medication Dose Route Frequency    [START ON 6/21/2022] predniSONE (DELTASONE) tablet 60 mg  60 mg Oral Daily    finasteride (PROSCAR) tablet 5 mg  5 mg Oral Daily    pantoprazole (PROTONIX) 40 mg in sodium chloride (PF) 10 mL injection  40 mg IntraVENous Daily    methylPREDNISolone sodium (SOLU-MEDROL) 500 mg in dextrose 5 % 250 mL IVPB  500 mg IntraVENous Q12H    sodium chloride flush 0.9 % injection 10 mL  10 mL IntraVENous PRN    atorvastatin (LIPITOR) tablet 40 mg  40 mg Oral Nightly    traZODone (DESYREL) tablet 50 mg  50 mg Oral Nightly    sodium chloride flush 0.9 % injection 5-40 mL  5-40 mL IntraVENous 2 times per day    sodium chloride flush 0.9 % injection 5-40 mL  5-40 mL IntraVENous PRN    0.9 % sodium chloride infusion   IntraVENous PRN    ondansetron (ZOFRAN-ODT) disintegrating tablet 4 mg  4 mg Oral Q8H PRN    Or    ondansetron (ZOFRAN) injection 4 mg  4 mg IntraVENous Q6H PRN    polyethylene glycol (GLYCOLAX) packet 17 g  17 g Oral Daily PRN    acetaminophen (TYLENOL) tablet 650 mg  650 mg Oral Q6H PRN    Or    acetaminophen (TYLENOL) suppository 650 mg  650 mg Rectal Q6H PRN    sodium bicarbonate tablet 650 mg  650 mg Oral TID    tamsulosin (FLOMAX) capsule 0.4 mg  0.4 mg Oral Daily    piperacillin-tazobactam (ZOSYN) 3,375 mg in sodium chloride 0.9 % 50 mL IVPB (mini-bag)  3,375 mg IntraVENous q8h       Signed:  Mirian Jackson MD    Part of this note may have been written by using a voice dictation software. The note has been proof read but may still contain some grammatical/other typographical errors.

## 2022-06-20 NOTE — PROGRESS NOTES
END OF SHIFT NOTE:    INTAKE/OUTPUT  06/19 0701 - 06/20 0700  In: 1773.4 [P.O.:880; I.V.:893.4]  Out: 1425 [Urine:1425]  Voiding: Yes  Catheter: Yes  Drain:   Urinary Catheter Mckeon (Active)   $ Urethral catheter insertion $ Not inserted for procedure 06/16/22 1336   Catheter Indications Urinary retention (acute or chronic), continuous bladder irrigation or bladder outlet obstruction 06/19/22 2000   Site Assessment Urethral drainage 06/19/22 2000   Urine Color Yellow 06/19/22 2000   Urine Appearance Clear 06/19/22 2000   Collection Container Standard 06/19/22 2000   Securement Method Securing device (Describe) 06/19/22 2000   Catheter Care Completed Yes 06/17/22 2000   Catheter Best Practices  Drainage tube clipped to bed;Catheter secured to thigh; Tamper seal intact; Bag below bladder;Bag not on floor; Lack of dependent loop in tubing;Drainage bag less than half full 06/19/22 2000   Status Draining;Patent 06/19/22 2000   Output (mL) 650 mL 06/19/22 2342   Discontinuation Reason Per nurse-driven protocol 05/58/69 1336               Flatus: Patient does have flatus present. Stool:  0 occurrences. Emesis: 0 occurrences. VITAL SIGNS  Patient Vitals for the past 12 hrs:   Temp Pulse Resp BP SpO2   06/20/22 0321 98.3 °F (36.8 °C) 63 18 (!) 145/84 96 %   06/19/22 2338 97.9 °F (36.6 °C) 59 17 (!) 148/68 94 %   06/19/22 2315 97.9 °F (36.6 °C) 59 17 (!) 148/68 94 %   06/19/22 2000 98.1 °F (36.7 °C) 69 19 (!) 142/63 95 %   06/19/22 1905 -- 69 -- -- 95 %   06/19/22 1815 98.1 °F (36.7 °C) 69 19 (!) 142/63 95 %         Ambulating  No    Shift report given to oncoming nurse at the bedside.     Jason Crenshaw RN

## 2022-06-20 NOTE — CONSULTS
General Surgery  Consult for Temporal Artery Biopsy noted. Recommend patient follow up after treatment for diverticulitis is completed and done as Outpatient.  Will place follow up with Dr Benja Soto as LEIF Soto - CNP

## 2022-06-21 PROBLEM — R63.0 DECREASED APPETITE: Status: ACTIVE | Noted: 2022-01-01

## 2022-06-21 PROBLEM — K57.92 DIVERTICULITIS: Status: RESOLVED | Noted: 2022-01-01 | Resolved: 2022-01-01

## 2022-06-21 PROBLEM — R43.2 LOSS OF TASTE: Status: ACTIVE | Noted: 2022-01-01

## 2022-06-21 PROBLEM — N17.9 ACUTE KIDNEY INJURY SUPERIMPOSED ON CHRONIC KIDNEY DISEASE (HCC): Status: RESOLVED | Noted: 2020-01-31 | Resolved: 2022-01-01

## 2022-06-21 PROBLEM — N18.9 ACUTE KIDNEY INJURY SUPERIMPOSED ON CHRONIC KIDNEY DISEASE (HCC): Status: RESOLVED | Noted: 2020-01-31 | Resolved: 2022-01-01

## 2022-06-21 PROBLEM — M31.6 GCA (GIANT CELL ARTERITIS) (HCC): Status: ACTIVE | Noted: 2022-01-01

## 2022-06-21 PROBLEM — E44.0 MODERATE PROTEIN-CALORIE MALNUTRITION (HCC): Chronic | Status: ACTIVE | Noted: 2022-01-01

## 2022-06-21 NOTE — PROGRESS NOTES
Spiritual Care visit. Initial Visit, Pre Surgery Consult. Visit and prayer before patient goes to surgery.     Visit by Isai Acosta M.Ed., Th.B. ,Staff

## 2022-06-21 NOTE — PROGRESS NOTES
Notified by primary nurse that patient and family does not want to get discharge. I have extensively discussed with patient and family regarding care plan as well as discharge disposition. Case management has also seen and discussed at length with patient and patient's family. Patient was seen along with case management at bedside around 3:30 PM.  Patient's daughter at bedside. They are concerned patient going home as he does not have 24-hour care at home. Plan to the patient that there is no medical necessity to keep the patient in the hospital at this time. Patient has received all the treatments that he would need as an inpatient and has been transitioned to oral medication for outpatient treatment. All the appropriate follow-up has been arranged. Case management has also ordered PT/OT, Meals on Wheels as well as referral to home RN. Patient and family are appreciative of the medical care that he had received here, however, they wish for the patient to remain in the hospital as they do not have help at home. They expressed understanding that patient does have any medical necessities to be in the hospital.   Patient is fully functioning based on PT/OT's evaluation therefore does not meet requirement to rehab facility. Discussed with patient that they have the right to appeal the discharge decision and phone number has been provided.

## 2022-06-21 NOTE — DISCHARGE SUMMARY
Hospitalist Discharge Summary   Admit Date:  2022 10:38 AM   DC Note date: 2022  Name:  Chichi Flores   Age:  80 y.o. Sex:  male  :  1932   MRN:  [id number]   Room:    PCP:  Annabelle Doe MD    Presenting Complaint: Hypotension     Initial Admission Diagnosis: Urinary retention [R33.9]  Diverticulitis [K57.92]  Diverticulosis [K57.90]  Diverticulitis of colon [K57.32]  Weight loss [R63.4]  Hypotension, unspecified hypotension type [I95.9]  Sepsis, due to unspecified organism, unspecified whether acute organ dysfunction present [facility]) [A41.9]     Problem List for this Hospitalization (present on admission):    Principal Problem (Resolved):    Diverticulitis  Active Problems:    Diverticulosis    Failure to thrive in adult    Hydroureteronephrosis    Anemia    Gastrointestinal hemorrhage associated with intestinal diverticulitis    Renal calculus, right    Prostate cancer ([address]    S/P radiation therapy    Metabolic acidosis    Mckeon catheter in place    DNR (do not resuscitate)    Blurry vision    Occlusion of right cilioretinal artery    Optic neuropathy, bilateral    Moderate protein-calorie malnutrition (HCC)    Decreased appetite    Loss of taste    GCA (giant cell arteritis) ([address]    Irregular cardiac rhythm    Hypertension    Mixed hyperlipidemia    Stage 3 chronic kidney disease (HCC)    Aortic stenosis, moderate    Urinary retention  Resolved Problems:    Acute kidney injury superimposed on chronic kidney disease ([address]    Did Patient have Sepsis (YES OR NO): No    Hospital Course:  Please refer to the admission H&P for details of presentation.      In summary, Chichi Flores is a 80 y.o. male with medical history significant for prostate cancer s/p radiation, CVA, CKD stage 3, HLD, known urinary retention but does not wear a Mckeon at home, aortic valve stenosis, and irregular heart rhythm on low dose Eliquis who presented with cc generalized weakness and 40 pound weight NUTRITION SUPPLEMENT; Breakfast; Standard High Calorie/High Protein Oral Supplement  ADULT ORAL NUTRITION SUPPLEMENT; Lunch, Dinner; Frozen Oral Supplement  ADULT DIET; Regular; Low Fat/Low Chol/High Fiber/2 gm Na; Grits for breakfast please! Code Status: DNR    Follow Ups:   Follow-up Information     Keara Carnes MD On 7/1/2022. Specialty: General Surgery  Why: Temporal Artery Biopsy /Temporal Arteritis   Contact information:  [address] [facility] [address]  [address] [facility]  [address] [facility] [address]              Nuzhat Crenshaw MD In 1 week. Specialty: Internal Medicine  Why: Diverticulitis and Vision loss likely due to Giant Cell Arteritis  Contact information:  Khadijah Espinoza              Deandre Santamaria MD In 2 weeks. Specialty: Ophthalmology  Why: Optic Neuropathy, Cilioretinal Artery Occlusion of right eye. Possible GCA on PO steriods  Contact information:  [address] [address]  [facility] In 2 weeks. Specialty: Neurology  Why: Aparna Bradley concerning for Giant Cell Arteritis   Contact information:  [address] [facility] [address] [phone]           [facility] [address]    Why: Urinary retention with 3mm non obstructing right renal stone  Contact information:  [address]  [address] [place] [id number] [phone]                     Time spent in patient discharge and coordination 37 minutes. Plan was discussed with patient and family. All questions answered. Patient was stable at time of discharge. Instructions given to call a physician or return if any concerns. Current Discharge Medication List      START taking these medications    Details   dronabinol (MARINOL) 2.5 MG capsule Take 1 capsule by mouth 2 times daily for 20 days. Qty: 40 capsule, Refills: 0    Associated Diagnoses:  Moderate protein-calorie malnutrition ([facility]      pantoprazole (PROTONIX) 40 MG tablet Take 1 tablet by mouth every morning (before breakfast)  Qty: 30 tablet, Refills: 3      amoxicillin-clavulanate (AUGMENTIN) 875-125 MG per tablet Take 1 tablet by mouth 2 times daily for 3 days  Qty: 6 tablet, Refills: 0      finasteride (PROSCAR) 5 MG tablet Take 1 tablet by mouth daily  Qty: 30 tablet, Refills: 0      tamsulosin (FLOMAX) 0.4 MG capsule Take 1 capsule by mouth daily  Qty: 30 capsule, Refills: 0      predniSONE (DELTASONE) 20 MG tablet Take 3 tablets by mouth daily for 20 days  Qty: 60 tablet, Refills: 0         CONTINUE these medications which have CHANGED    Details   acetaminophen (TYLENOL) 500 MG tablet Take 2 tablets by mouth every 6 hours as needed for Pain  Qty: 120 tablet, Refills: 0         CONTINUE these medications which have NOT CHANGED    Details   ondansetron (ZOFRAN) 4 MG tablet Take 1 tablet by mouth every 8 hours as needed for Nausea or Vomiting  Qty: 12 tablet, Refills: 0      traZODone (DESYREL) 50 MG tablet TAKE 1 TABLET EVERY NIGHT  Qty: 90 tablet, Refills: 0      atorvastatin (LIPITOR) 40 MG tablet TAKE 1 TABLET EVERY NIGHT  Qty: 90 tablet, Refills: 0      allopurinol (ZYLOPRIM) 300 MG tablet Take 300 mg by mouth daily      apixaban (ELIQUIS) 2.5 MG TABS tablet Take 2.5 mg by mouth 2 times daily      aspirin 81 MG EC tablet Take 81 mg by mouth daily      benazepril (LOTENSIN) 40 MG tablet Take 40 mg by mouth daily      furosemide (LASIX) 20 MG tablet Take 20 mg by mouth daily         STOP taking these medications       fluticasone (FLONASE) 50 MCG/ACT nasal spray Comments:   Reason for Stopping:               Procedures done this admission:  * No surgery found *    Consults this admission:  IP CONSULT TO DIETITIAN  IP CONSULT TO SPIRITUAL SERVICES  IP CONSULT TO SPIRITUAL SERVICES  IP CONSULT TO OPHTHALMOLOGY  IP CONSULT TO NEUROLOGY  IP CONSULT TO [facility]    Echocardiogram results:  No results found for this or any previous visit. Diagnostic Imaging/Tests:   XR CHEST PORTABLE    Result Date: 6/16/2022  Exam: XR CHEST PORTABLE on 6/16/2022 11:10 AM Clinical History: The Male patient is 80years old  presenting for Sepsis. Comparison:  Chest x-ray 6/18/2022 Findings:  Frontal view of the chest was obtained. Shallow inspiratory changes are seen. No pleural effusions are demonstrated. The cardiomediastinal silhouette is within normal limits. There are no acute osseous abnormalities. 1. Shallow inspiratory changes. CPT code(s) 10034     CT CHEST ABDOMEN PELVIS WO CONTRAST    Result Date: 6/16/2022  EXAMINATION: CT CHEST, ABDOMEN, AND PELVIS 6/16/2022 11:26 AM ACCESSION NUMBER: [id number] INDICATION: 40 lb weight loss. Lower abdominal pain w/ bloody diarrhea COMPARISON: None available TECHNIQUE: Multiple contiguous axial CT images of the chest, abdomen, and pelvis were obtained from the thoracic inlet to the pubic symphysis without contrast. Radiation dose reduction techniques were used for this study:  Our CT scanners use one or all of the following: Automated exposure control, adjustment of the mA and/or kVp according to patient's size, iterative reconstruction. FINDINGS: CHEST: LUNGS/PLEURA: Central airways are patent. No consolidation, pleural effusion, or pneumothorax. Calcified granulomas in the right lower lobe. No suspicious pulmonary nodule or mass. MEDIASTINUM: The visualized thyroid is normal. The thoracic esophagus is unremarkable. Small sliding hiatal hernia. The heart is normal in size without pericardial effusion. There are calcifications on the aortic valve. The great vessels are normal in caliber with atherosclerotic calcifications in the aorta, branch vessels, and coronary arteries. No mediastinal or hilar lymphadenopathy. There are multiple calcified right hilar and mediastinal lymph nodes compatible with prior granulomatous disease. BONES AND SOFT TISSUES: Subcutaneous soft tissues are within normal limits. No acute or aggressive osseous abnormality. ABDOMEN/PELVIS: LIVER: Normal BILIARY: Status post cholecystectomy. No biliary duct dilation. SPLEEN: Normal in size with numerous calcified granulomas. PANCREAS: No pancreatic duct dilation or mass. ADRENALS: No adrenal nodules or hypertrophy. URINARY: There is bilateral cortical thinning and renal atrophy. The urinary bladder is massively distended with severe right-sided hydroureteronephrosis. No obstructing etiology identified at the right UVJ. 3 mm nonobstructing right inferior pole renal calculus. Subcentimeter exophytic lesion on the anterior cortex of the left lower pole is too small to characterize but statistically a cyst. BOWEL: Stomach is normal in caliber and wall thickness. There are a few surgical clips adjacent to the gastric antrum. Small bowel is normal in caliber and wall thickness. Colonic diverticulosis with mild wall thickening and pericolonic fat stranding along the descending colon. No pericolonic abscess or free air. VESSELS: The abdominal aorta and iliac arterial system are nonaneurysmal with mild atherosclerotic calcifications. NODES: No lymphadenopathy. FLUID: No free intraperitoneal fluid. REPRODUCTIVE: The prostate is diminutive with a central area of hypoattenuation likely related to prior TURP. BONES/SOFT TISSUES: No acute or aggressive osseous abnormality. Regional soft tissues are unremarkable. 1. No evidence of malignancy in the chest, abdomen, or pelvis by noncontrast CT. 2. Extensive colonic diverticulosis with mild acute diverticulitis involving the descending colon. 3. Severely distended urinary bladder with severe right hydroureteronephrosis, possibly resulting from the presumed chronic bladder outlet obstruction. No obstructing etiology identified at the UVJ. 4. Bilateral renal atrophy with 3 mm nonobstructing right renal calculus.          Labs: Results: BMP, Mg, Phos Recent Labs     06/19/22  0718 06/20/22  0640 06/21/22  0756    140 138   K 4.3 3.9 4.0   * 112* 110*   CO2 17* 20* 21   BUN 26* 32* 37*      CBC Recent Labs     06/19/22  0718 06/20/22  0640 06/21/22  0756   WBC 7.8 11.5* 11.1   RBC 3.26* 3.30* 3.33*   HGB 10.4* 10.7* 10.8*   HCT 32.7* 33.0* 32.8*   * 160 169      LFT No results for input(s): ALT, TP, ALB, GLOB in the last 72 hours.     Invalid input(s): SGOT, TBIL, AP, AGRAT, GPT   Cardiac Testing No results found for: BNP, CPK, RCK1, CKMB   Coagulation Tests Lab Results   Component Value Date    INR 1.2 06/16/2022    INR 1.0 02/17/2020      A1c No results found for: HBA1C   Lipid Panel Lab Results   Component Value Date    CHOL 98 11/19/2021    HDL 51 11/19/2021    VLDL 13 11/19/2021      Thyroid Panel Lab Results   Component Value Date    TSH 3.920 11/27/2019        Most Recent UA Lab Results   Component Value Date    MUCUS 0 06/16/2022          All Labs from Last 24 Hrs:  Recent Results (from the past 24 hour(s))   CBC    Collection Time: 06/21/22  7:56 AM   Result Value Ref Range    WBC 11.1 4.3 - 11.1 K/uL    RBC 3.33 (L) 4.23 - 5.6 M/uL    Hemoglobin 10.8 (L) 13.6 - 17.2 g/dL    Hematocrit 32.8 (L) 41.1 - 50.3 %    MCV 98.5 (H) 79.6 - 97.8 FL    MCH 32.4 26.1 - 32.9 PG    MCHC 32.9 31.4 - 35.0 g/dL    RDW 15.4 (H) 11.9 - 14.6 %    Platelets 973 917 - 609 K/uL    MPV 12.1 9.4 - 12.3 FL    nRBC 0.03 0.0 - 0.2 K/uL   Basic Metabolic Panel w/ Reflex to MG    Collection Time: 06/21/22  7:56 AM   Result Value Ref Range    Sodium 138 136 - 145 mmol/L    Potassium 4.0 3.5 - 5.1 mmol/L    Chloride 110 (H) 98 - 107 mmol/L    CO2 21 21 - 32 mmol/L    Anion Gap 7 7 - 16 mmol/L    Glucose 140 (H) 65 - 100 mg/dL    BUN 37 (H) 8 - 23 MG/DL    CREATININE 1.80 (H) 0.8 - 1.5 MG/DL    GFR  46 (L) >60 ml/min/1.73m2    GFR Non- 38 (L) >60 ml/min/1.73m2    Calcium 9.5 8.3 - 10.4 MG/DL       Allergies   Allergen Reactions    Gabapentin Other (See Comments)     Dizzy. Pt reports that he's now taking the medicine at night before bed - no longer having issues and is continuing to take this med.  9/22/2016     Immunization History   Administered Date(s) Administered    COVID-19, Pfizer Purple top, DILUTE for use, 12+ yrs, 30mcg/0.3mL dose 01/21/2021, 02/13/2021, 10/06/2021    Influenza Virus Vaccine 11/08/2012, 10/29/2013, 10/05/2020    Influenza, High Dose (Fluzone 65 yrs and older) 09/22/2016, 10/03/2017, 09/30/2018, 10/16/2019, 11/19/2021    Influenza, Trivalent Pf 08/31/2015    PPD Test 02/17/2020, 06/16/2022    Pneumococcal Conjugate 13-valent ([id number]) 08/31/2015    Pneumococcal Polysaccharide ([id number]) 08/19/2013    Pneumococcal Vaccine 11/21/1997    Tdap (Boostrix, Adacel) 12/12/2014    Zoster Live (Zostavax) 12/09/2015       Recent Vital Data:  Patient Vitals for the past 24 hrs:   Temp Pulse Resp BP SpO2   06/21/22 1123 97.9 °F (36.6 °C) 59 18 134/60 98 %   06/21/22 0744 -- 56 -- (!) 147/66 97 %   06/21/22 0742 98.4 °F (36.9 °C) 62 18 (!) 186/83 97 %   06/21/22 0456 98.4 °F (36.9 °C) 60 20 133/65 94 %   06/20/22 2306 97.3 °F (36.3 °C) 64 20 (!) 154/81 94 %   06/20/22 1924 97.9 °F (36.6 °C) 57 20 137/67 95 %   06/20/22 1524 97.7 °F (36.5 °C) 53 17 (!) 140/64 95 %       Oxygen Therapy  SpO2: 98 %  O2 Device: None (Room air)    Estimated body mass index is 32 kg/m² as calculated from the following:    Height as of this encounter: 5' 7\" (1.702 m). Weight as of this encounter: 204 lb 4.8 oz (92.7 kg). Intake/Output Summary (Last 24 hours) at 6/21/2022 1342  Last data filed at 6/21/2022 1321  Gross per 24 hour   Intake 477 ml   Output 300 ml   Net 177 ml         Physical Exam:     General:          Well nourished. Alert and awake, ill appearing  Head:               Normocephalic, atraumatic  Eyes:               Sclerae appear normal.  Pupils equally round.   ENT:                Nares appear normal, no drainage. Moist oral mucosa  Neck:               No restricted ROM. Trachea midline   CV:                  RRR. No jugular venous distension. Lungs:             CTAB. No wheezing  Respirations even, unlabored  Abdomen: Bowel sounds present. Soft, nontender, nondistended. Extremities:     No cyanosis or clubbing. No edema  Skin:                No rashes and normal coloration. Warm and dry. Neuro:             A&Ox3  Psych:             Normal mood and affect. Signed:  Andre Pickens MD    Part of this note may have been written by using a voice dictation software. The note has been proof read but may still contain some grammatical/other typographical errors.

## 2022-06-21 NOTE — PROGRESS NOTES
PHYSICAL THERAPY Daily Note and AM  (Link to Caseload Tracking: PT Visit Days : 3  Time In/Out PT Charge Capture  Rehab Caseload Tracker  Orders      Yumi Leigh is a 80 y.o. male   PRIMARY DIAGNOSIS: Diverticulitis  Urinary retention [R33.9]  Diverticulitis [K57.92]  Diverticulosis [K57.90]  Diverticulitis of colon [K57.32]  Weight loss [R63.4]  Hypotension, unspecified hypotension type [I95.9]  Sepsis, due to unspecified organism, unspecified whether acute organ dysfunction present ([facility] [A41.9]       Inpatient: Payor: Bonnie Amaya / Plan: [employer]MO / Product Type: *No Product type* /     ASSESSMENT:     REHAB RECOMMENDATIONS:   Recommendation to date pending progress:  Setting:   No further skilled therapy after discharge from hospital    HHPT    Equipment:     To Be Determined     ASSESSMENT:  Mr. Jj Nichole is supine in bed  and agreeable to therapy. Mobility is slow and slightly guarded. Sit to stand with min assist to the walker. Gait training with rolling walker x 90 feet with slow justin. Patient is returned to the room and to sitting in the recliner. Daughter at bedside and states that she is not sure about the patient going home today. Patient is able to position himself. Needs within reach and alarm intact. Pt improving with ambulation. Making progress. No further therapy at d/c vs HHPT.      SUBJECTIVE:   Mr. Jj Nichole states, \"Hello\"     Social/Functional Lives With: Daughter  Type of Home: House  Home Layout: Able to Live on Main level with bedroom/bathroom  Home Access: Stairs to enter without rails  Entrance Stairs - Number of Steps: 4  OBJECTIVE:     PAIN: [place] [employer]oon / O2: PRECAUTION / [place] / DRAINS:   Pre Treatment: 0/10         Post Treatment: 0/10 Vitals        Oxygen    IV    RESTRICTIONS/PRECAUTIONS:        MOBILITY: I Mod I S SBA CGA Min Mod Max Total  NT x2 Comments:   Bed Mobility    Rolling [] [] [x] [] [] [] [] [] [] [] []    Supine to Sit [] [] [] [] [] [] [] [] [] [x] []    Scooting [] [] [x] [] [] [] [] [] [] [] []    Sit to Supine [] [] [x] [] [] [] [] [] [] [] []    Transfers    Sit to Stand [] [] [] [x] [] [] [] [] [] [] []    Bed to Chair [] [] [] [] [] [] [] [] [] [x] []    Stand to Sit [] [] [] [x] [] [] [] [] [] [] []     [] [] [] [] [] [] [] [] [] [] []    I=Independent, Mod I=Modified Independent, S=Supervision, SBA=Standby Assistance, CGA=Contact Guard Assistance,   Min=Minimal Assistance, Mod=Moderate Assistance, Max=Maximal Assistance, Total=Total Assistance, NT=Not Tested    BALANCE: Good Fair+ Fair Fair- Poor NT Comments   Sitting Static [x] [] [] [] [] []    Sitting Dynamic [x] [] [] [] [] []              Standing Static [] [x] [] [] [] []    Standing Dynamic [] [x] [] [] [] []      GAIT: I Mod I S SBA CGA Min Mod Max Total  NT x2 Comments:   Level of Assistance [] [] [] [] [x] [] [] [] [] [] []    Distance   90 feet    DME Rolling Walker    Gait Quality Decreased justin , Decreased step clearance, Decreased step length, Decreased stance and Shuffling     Weightbearing Status      Stairs      I=Independent, Mod I=Modified Independent, S=Supervision, SBA=Standby Assistance, CGA=Contact Guard Assistance,   Min=Minimal Assistance, Mod=Moderate Assistance, Max=Maximal Assistance, Total=Total Assistance, NT=Not Tested    PLAN:   ACUTE PHYSICAL THERAPY GOALS:   (Developed with and agreed upon by patient and/or caregiver.)  (1.) Arthur Rodriguez will perform bed mobility with INDEPENDENCE within 5 treatment day(s). (2.) Arthur Rodriguez will transfer from bed to chair and chair to bed with INDEPENDENCE using the least restrictive device within 5 treatment day(s). (3.) Arthursarina Rodriguez will ambulate with INDEPENDENCE for 250 feet with the least restrictive device within 5 treatment day(s).    (4.) Arthur Heap will ascend and descend 4 stairs using either hand rail(s) with MODIFIED INDEPENDENCE to improve functional mobility and safety within 5 treatment day(s). (5.) Mary Jo Mark will perform bilateral lower extremity exercises x 15 min for HEP with SUPERVISION to improve strength, endurance, and functional mobility within 5 treatment day(s). FREQUENCY AND DURATION: 3 times/week for duration of hospital stay or until stated goals are met, whichever comes first.    TREATMENT:   TREATMENT:   Therapeutic Activity (23 Minutes): Therapeutic activity included Rolling, Supine to Sit, Scooting, Transfer Training, Ambulation on level ground, Sitting balance  and Standing balance to improve functional Activity tolerance, Balance, Coordination, Mobility and Strength.     TREATMENT GRID:  N/A    AFTER TREATMENT PRECAUTIONS: Bed/Chair Locked, Call light within reach, Chair, Needs within reach, RN notified and Visitors at bedside    INTERDISCIPLINARY COLLABORATION:  RN/ PCT and PT/ PTA    EDUCATION:      TIME IN/OUT:  Time In: 1055  Time Out: 1118  Minutes: 23    Gay Peters PTA

## 2022-06-21 NOTE — PROGRESS NOTES
Comprehensive Nutrition Assessment    Type and Reason for Visit: Reassess  Unintentional Weight Loss (Hospitalists)    Nutrition Recommendations/Plan:   Meals and Snacks:  Diet: Continue current order   Milk with meals for additional calories and protein. Nutrition Supplement Therapy:   Medical food supplement therapy:  Continue Ensure Enlive once per day (this provides 350 kcal and 20 grams protein per bottle) and Magic Cup twice per day (this provides 290 kcal and 9 grams protein per serving)      Malnutrition Assessment:  Malnutrition Status: Moderate malnutrition  Context: Chronic Illness  Findings of clinical characteristics of malnutrition:   Energy Intake:  Mild decrease in energy intake (Comment) (declining po d/t taste alteration unable to quantify)  Weight Loss:  Greater than 7.5% over 3 months (8% loss over 2 mos)     Body Fat Loss:  Mild body fat loss Triceps   Muscle Mass Loss:  Mild muscle mass loss Temples (temporalis),Hand (interosseous)  Fluid Accumulation:   (+ edema ble)     Strength:  Not Performed  Nutrition Assessment:  Nutrition History: Hx per pt and daughter (who does not live w him) at bedside. Pt c/o taste alterations and poor appetite leading to declining oral intake. HE reports at times foods sounds good but he end sup giving it to the dog including a recent trip from [employer] primarily associated with taste alterations. He states everything tastes the same but is unable to describe the taste. He reports water, milk and oj still taste the same. Daughters have been attempting to get him to drink Ensure and gatorade varied success reported. Daughter at bedside reports 50# wt loss over several months, h&p indicates 40# wt loss. Wt hx per EMR review: Cards office:  233# Aug 2021, 228 Jan 2022, IM office 215# April 2022, 199# early June.        Do You Have Any Cultural, Zoroastrian, or Ethnic Food Preferences?: No   Nutrition Background:   medical history significant for prostate cancer s/p radiation, CVA, CKD stage 3, HLD, known urinary retention but does not wear a Mckeon at home, aortic valve stenosis, and irregular heart rhythm on low dose Eliquis. Admitted with diverticulitis with GI hemorrhage, acute blood loss anemia d/t GIB, MIKO on CKD d/t urinary retention from obstructive uropathy, FTT, irregular cardiac rhythm, metabolic acidosis d/t miko. 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 Nutrition Interval:  Wt loss of 37# since August of 2021, wt loss noted to be more rapid since April. Visit with pt and daughter at bedside. Pt up to recliner. He continues with struggles with oral intake stating today everything taste like medicine. He is accepting of magic cups, some outside pudding cups and oral intake of foods has increased to greater than bites. Pt accepted milk at RD visit, tried glucerna for varied taste preference and pt reported it tasted worse than others. Current Nutrition Therapies:  ADULT ORAL NUTRITION SUPPLEMENT; Breakfast; Standard High Calorie/High Protein Oral Supplement  ADULT ORAL NUTRITION SUPPLEMENT; Lunch, Dinner; Frozen Oral Supplement  ADULT DIET; Regular; Low Fat/Low Chol/High Fiber/2 gm Na; Grits for breakfast please! Current Intake:   Average Meal Intake: 26-50% Average Supplements Intake: 26-50%      Anthropometric Measures:  Height: 5' 7\" (170.2 cm)  Current Body Wt: 204 lb 5.9 oz (92.7 kg) (6/20), Weight source: Bed Scale  BMI: 32  Admission Body Weight: 195 lb 15.8 oz (88.9 kg) (stated, pt reprots standing daily home wt)  Ideal Body Weight (Kg) (Calculated): 67 kg (148 lbs), 132.4 %  Usual Body Wt: 215 lb (97.5 kg) (IM office April, 199# IM office early June), Percent weight change: -8.8       Edema: No data recorded  BMI Category Obese Class 1 (BMI 30.0-34. 9)  Estimated Daily Nutrient Needs:  Energy (kcal/day): 4634-6987 (20-25 kcal/kg) (Kcal/kg Weight used: 88.9 kg Admission  Protein (g/day):  (1-1.2 g/kg) Weight Used: (Admission) 88.9 kg  Fluid (ml/day):   (1 ml/kcal)    Nutrition Diagnosis:   · Inadequate oral intake related to  (taste alterations, poor appetite) as evidenced by  (self reported barriers, po slow to improve <50% needs)    · Moderate malnutrition,In context of chronic illness related to altered taste perception (declining oral intake) as evidenced by Criteria as identified in malnutrition assessment    Nutrition Interventions:   Food and/or Nutrient Delivery: Continue Current Diet,Continue Oral Nutrition Supplement     Coordination of Nutrition Care: Continue to monitor while inpatient       Goals:   Previous Goal Met: Progressing toward Goal(s)  Active Goal: PO intake 50% or greater,by next RD assessment       Nutrition Monitoring and Evaluation:      Food/Nutrient Intake Outcomes: Food and Nutrient Intake,Supplement Intake  Physical Signs/Symptoms Outcomes: Biochemical Data,GI Status,Meal Time Behavior,Weight    Discharge Planning:    Continue Oral Nutrition Supplement    Milvia RICHARD, RD

## 2022-06-21 NOTE — PROGRESS NOTES
Patient with discharge orders today. RNCM and Dr Trevin Rainey met with patient and patient's daughter to discuss concerns going home alone during the day with no care. RNCM explained ordering  [facility] RN,PT,OT, SW and HHA, referral sent [phone]. Meals on Wheels referral sent via email and fax. Explained to patient's daughter that patient is medically stable for discharge. Patient does not have a skilled need to go to rehab nor does patient have diagnosis of legally blind in right eye that warrants 24hr care. Explained patient could dispute discharge home; however, if results not in patient's favor then patient will be responsible for cost. RNCM explained that [employer] [facility] was notified for possible Medicaid, should patient qualify. Naren to provide application to patient. Also encouraged patient's daughter to assist with care at discharge since patient's daughter at home during the day. Patient and daughter at bedside verbalized understanding. Patient;s daughter at bedside to provide transportation. Patient has met all treatment goals and milestones. No further supportive needs identified. CM following until discharged.         ASSESSMENT NOTE    Attending Physician: Andre Pickens MD  Admit Problem: Urinary retention [R33.9]  Diverticulitis [K57.92]  Diverticulosis [K57.90]  Diverticulitis of colon [K57.32]  Weight loss [R63.4]  Hypotension, unspecified hypotension type [I95.9]  Sepsis, due to unspecified organism, unspecified whether acute organ dysfunction present [facility]) [A41.9]  Date/Time of Admission: 6/16/2022 10:38 AM  Problem List:  Patient Active Problem List   Diagnosis    Acquired contracture of bladder neck    Dizziness    YOUSSEF (dyspnea on exertion)    Irregular cardiac rhythm    Primary osteoarthritis of right knee    Acute [facility]bro[facility] accident) [facility] [address]    Chronic right-sided headaches    Gout    Left leg paresthesias    Left-sided weakness    TIA (transient ischemic attack)    Slurred speech    Localized edema    Chronic low back pain    Peripheral neuropathy    Hypertension    Peptic ulcer disease    Mixed hyperlipidemia    Stage 3 chronic kidney disease (HCC)    Aortic stenosis, moderate    Urinary retention    Hyperkalemia    Diverticulosis    Failure to thrive in adult    Hydroureteronephrosis    Anemia    Gastrointestinal hemorrhage associated with intestinal diverticulitis    Renal calculus, right    Prostate cancer ([facility] [address]    S/P radiation therapy    Metabolic acidosis    Mckeon catheter in place    DNR (do not resuscitate)    Blurry vision    Occlusion of right cilioretinal artery    Optic neuropathy, bilateral    Moderate protein-calorie malnutrition (HCC)    Decreased appetite    Loss of taste    GCA (giant cell arteritis) [facility])       Service Assessment  Patient Orientation Person,Place   Cognition Alert   History Provided By Child/Family   Primary Caregiver Family   Accompanied By/Relationship patient's daughter: Vallecito Mooring  [address] [place] [address] is: Legal Next of Kin   PCP Verified by CM Yes (Dr Marylen Ober  [phone]; [facility] in [place]   Last Visit to PCP Within last 3 months   Prior Functional Level Assistance with the following:,Mobility   Current Functional Level     Can patient return to prior living arrangement Yes   Ability to make needs known: Good   Family able to assist with home care needs: Yes   Would you like for me to discuss the discharge plan with any other family members/significant others, and if so, who?      Financial Resources     Community Resources     CM/SW Referral       Social/Functional History  Lives With Daughter   Type of [address] to Live on Main level with bedroom/bathroom   Home Access Stairs to enter without rails   Entrance Stairs - Number of Steps 4   Entrance Stairs - [employer] [facility] [facility] Resource Information Provided? No   Mode of Transport at Discharge [facility] Time of Discharge     Confirm Follow Up Transport Family     Condition of Participation: Discharge Planning  The plan for Transition of Care is related to the following treatment goals: return to baseline   The Patient and/or Patient Representative was provided with a Choice of Provider? Patient   Name of the Patient Representative who was provided with the Choice of Provider and agrees with the Discharge Plan? The Patient and/or Patient Representative Agree with the Discharge Plan? Yes   Freedom of Choice list was provided with basic dialogue that supports the individualized plan of care/goals, treatment preferences, and shares the quality data associated with the providers?  Yes     Documentation for Discharge Appeal  Discharge Appealed by     Date notified by QIO of appeal request:     Time notified by QIO of appeal request:     Detailed Notice of Discharge given to:     Date Notice of Discharge given:     Time Notice of Discharge given:     [facility] [address] [facility]     Time [address] sent [address]     Date Notified of Outcome     Time Notified of Outcome     Outcome of appeal           Li Peña RN 06/21/22 2:09 PM

## 2022-06-21 NOTE — PROGRESS NOTES
Pt and family are very worried about discharge home. Pt worried he will not be able to take care of himself. SW at bedside. Provider notified.

## 2022-06-22 NOTE — TELEPHONE ENCOUNTER
I called the pts daughter, Robyn Mckee (on MCKAYLA), back. She stated the pt just got out of the hospital and he has a place on his arm (she is unsure if it is where they took the IV out or not) that won't stop bleeding. She notes she thought she was aggravating the site more when she changed the gauze; so, she went and bought some non-stick gauze. I suggested she create a pressure bandage, by placing the non-stick gauze on first, then folding a couple of pieces of regular gauze and placing it on top of the non-stick gauze, then wrap it with the ace bandage. I told her not to wrap it too tight or too loose. If it is no better by tomorrow to call us back, or if it begins to bleed profusely she needs to take him to Urgent Care. She verbalized understanding. She asked if she could put polysporin on the site. I told her I thought that would be fine. She again verbalized understanding.

## 2022-06-22 NOTE — CARE COORDINATION
Care Transitions Outreach Attempt    Call within 2 business days of discharge: Yes   Attempted to reach patient for transitions of care follow up. Unable to reach patient. Patient: Henrique Luu Patient : 1932 MRN: [id number]    Last Discharge [facility]       Complaint Diagnosis Description Type Department Provider    22 Hypotension Hypotension, unspecified hypotension type . .. ED to Hosp-Admission (Discharged) (ADMITTED) Anjali Luevano MD; Cj Delarosa. .. Was this an external facility discharge?  No Discharge Facility: N/A    Noted following upcoming appointments from discharge chart review:   [facility] follow up appointment(s):   Future Appointments   Date Time Provider Apurva Haskins   2022 10:00 AM Alexis Garcia MD [facility] AMB   2022 10:20 [facility]   8/15/2022  1:00 PM LEIF Atkins [facility] AMB   2022 11:00 AM Tamie Ruff MD [facility] AMB     Non-[facility] follow up appointment(s):

## 2022-06-23 NOTE — CARE COORDINATION
Care Transitions Outreach Attempt    Call within 2 business days of discharge: Yes   Attempted to reach patient for transitions of care follow up. Unable to reach patient. Patient: Jelena Llamas Patient : 1932 MRN: [id number]    Last Discharge [facility]       Complaint Diagnosis Description Type Department Provider    22 Hypotension Hypotension, unspecified hypotension type . .. ED to Hosp-Admission (Discharged) (ADMITTED) Salty Holt MD; Cj Delarosa. .. Was this an external facility discharge?  No Discharge Facility: N/A    Noted following upcoming appointments from discharge chart review:   [facility] follow up appointment(s):   Future Appointments   Date Time Provider Apurva Haskins   2022 10:00 AM Astrid Orr MD [address] [facility] AMB   2022 10:20 AM [facility]   8/15/2022  1:00 PM LEIF Westbrook [facility] AMB   2022 11:00 AM Peggy Mansfield MD [facility]     Non-[facility] follow up appointment(s):

## 2022-06-23 NOTE — TELEPHONE ENCOUNTER
I called and notified Lilly Khanna that Dr. Chaitanya Alexis will not do verbal orders and the pt will need a hosp f/u before he will sign. She stated she would see if the hospitalist would sign until he comes in.

## 2022-06-23 NOTE — TELEPHONE ENCOUNTER
Daniela from [facility] is needing a verbal order from  to see the pt. He came home from the hospital Tuesday and they are needing an order for him to be seen for home health care. Adryan Barfield can be reached at [phone] and it is okay to leave a voicemail message.

## 2022-06-28 NOTE — TELEPHONE ENCOUNTER
Daniela from [address] [facility] called to inform that the pt has a wound on his left fore arm and they just need  to evaluate and document that he looked at it tomorrow at the pt's appt.

## 2022-06-29 NOTE — PROGRESS NOTES
Marcela Woods M.D. Internal Medicine  [address] [facility] [address] , [address]   Office : [phone]  Fax : [phone]    Chief Complaint   Patient presents with    Weight Loss     The pt is in for a 3 wk f/u, but it appears he has been in the hospital since. The pt will need HH services: nursing, PT, OT       History of Present Illness:  Lisha Jiménez is a 80 y.o. male. HPI    Acute Post-renal failure due to obstruction and has a stone  Admitted June 16th and discharged June 21. Needs outpatient follow up with urology    Possible Temporal Arteritis  Sed rate 29 preadmission with CRP of 0.6. Currently on oral steroids with follow up scheduled with ophthalmology and neurology and has appt with surgery for temporal artery biopsy    ? Diverticulitis  Completed antibiotics      Past Medical History:  Past Medical History:   Diagnosis Date    Acute CVA (cerebrovascular accident) ([address]     Cancer of prostate ([facility] [address] 2001    radiation tx     CKD (chronic kidney disease) stage 3, GFR 30-59 ml/min ([address] 8/18/2012    Gout 5/15/7294    Metabolic syndrome 1/89/8312    Mixed hyperlipidemia 8/18/2012    Moderate aortic valve stenosis     Peptic ulcer disease 8/18/2012    Unspecified essential hypertension 8/18/2012    Urinary retention      Past Surgical History:  Past Surgical History:   Procedure Laterality Date    APPENDECTOMY  1950    CHOLECYSTECTOMY      COLONOSCOPY  2002    GASTRECTOMY      V&A, 1999    TURP  2000, 2015    Dx CA, followed by radiation Rx     Allergies: Allergies   Allergen Reactions    Gabapentin Other (See Comments)     Dizzy.     Pt reports that he's now taking the medicine at night before bed - no longer having issues and is continuing to take this med.  9/22/2016     Medications:   Current Outpatient Medications   Medication Sig Dispense Refill    dronabinol (MARINOL) 2.5 MG capsule Take 1 capsule by mouth 2 times daily for 20 days. 40 capsule 0    pantoprazole (PROTONIX) 40 MG tablet Take 1 tablet by mouth every morning (before breakfast) 30 tablet 3    finasteride (PROSCAR) 5 MG tablet Take 1 tablet by mouth daily 30 tablet 0    tamsulosin (FLOMAX) 0.4 MG capsule Take 1 capsule by mouth daily 30 capsule 0    predniSONE (DELTASONE) 20 MG tablet Take 3 tablets by mouth daily for 20 days 60 tablet 0    acetaminophen (TYLENOL) 500 MG tablet Take 2 tablets by mouth every 6 hours as needed for Pain 120 tablet 0    traZODone (DESYREL) 50 MG tablet TAKE 1 TABLET EVERY NIGHT 90 tablet 0    atorvastatin (LIPITOR) 40 MG tablet TAKE 1 TABLET EVERY NIGHT 90 tablet 0    allopurinol (ZYLOPRIM) 300 MG tablet Take 300 mg by mouth daily      apixaban (ELIQUIS) 2.5 MG TABS tablet Take 2.5 mg by mouth 2 times daily      aspirin 81 MG EC tablet Take 81 mg by mouth daily      benazepril (LOTENSIN) 40 MG tablet Take 40 mg by mouth daily      furosemide (LASIX) 20 MG tablet Take 20 mg by mouth daily      ondansetron (ZOFRAN) 4 MG tablet Take 1 tablet by mouth every 8 hours as needed for Nausea or Vomiting (Patient not taking: Reported on 2022) 12 tablet 0     No current facility-administered medications for this visit. Social History:  Social History     Tobacco Use    Smoking status: Former Smoker     Packs/day: 1.00     Quit date: 1960     Years since quittin.4    Smokeless tobacco: Never Used    Tobacco comment: Quit smoking: quit 60 years ago   Substance Use Topics    Alcohol use: No     Family History  Family History   Problem Relation Age of Onset    Dementia Father        Review of Systems   Constitutional: Positive for activity change, appetite change and unexpected weight change. Negative for chills and fever. Eyes: Negative for visual disturbance. Respiratory: Negative for shortness of breath. Cardiovascular: Negative for chest pain and leg swelling.    Gastrointestinal: Negative for abdominal distention and abdominal pain. Genitourinary: Negative for difficulty urinating. Skin: Negative for rash. Neurological: Negative for headaches. Psychiatric/Behavioral: Negative for confusion. Vital Signs  /61 (Site: Right Upper Arm, Position: Sitting, Cuff Size: Medium Adult)   Pulse 81   Temp 99.1 °F (37.3 °C) (Temporal)   Ht 5' 7\" (1.702 m)   Wt 189 lb 9.6 oz (86 kg)   SpO2 98%   BMI 29.70 kg/m²   Body mass index is 29.7 kg/m². Physical Exam  Vitals reviewed. Constitutional:       General: He is not in acute distress. Appearance: Normal appearance. He is not ill-appearing. HENT:      Head: Normocephalic and atraumatic. Eyes:      General: No scleral icterus. Extraocular Movements: Extraocular movements intact. Conjunctiva/sclera: Conjunctivae normal.   Neck:      Vascular: No carotid bruit. Cardiovascular:      Rate and Rhythm: Normal rate and regular rhythm. Heart sounds: Normal heart sounds. No murmur heard. Pulmonary:      Effort: Pulmonary effort is normal.      Breath sounds: Normal breath sounds. Abdominal:      General: Bowel sounds are normal.      Palpations: Abdomen is soft. Musculoskeletal:         General: No swelling. Skin:     Coloration: Skin is not jaundiced. Findings: Signs of injury and wound present. No rash. Neurological:      General: No focal deficit present. Mental Status: He is alert. Mental status is at baseline. Cranial Nerves: No cranial nerve deficit. Motor: Weakness present. Gait: Gait abnormal.   Psychiatric:         Behavior: Behavior normal.         Thought Content: Thought content normal.         Judgment: Judgment normal.           Assessment/Plan:  Monika Garcia was seen today for weight loss.     Diagnoses and all orders for this visit:    Skin tear of forearm without complication, left, initial encounter  -     External Referral to Home Health    Moderate protein-calorie malnutrition ([facility]    GCA (giant cell arteritis) (HCC)    Prostate cancer (HCC)    Poor appetite    Weight loss    Hydroureteronephrosis    Optic neuropathy, bilateral    Wound care with antibiotic ointment and nonadherent dressing BID    Continue all current medications    Return in about 4 weeks (around 7/27/2022), or if symptoms worsen or fail to improve.   __  Paola Dixon M.D.

## 2022-07-01 NOTE — CARE COORDINATION
Spoke with daughter Gwendolyn Cano, patient is not feeling well and they are at [facility] now. For follow up.

## 2022-07-11 NOTE — CARE COORDINATION
Care Transitions Outreach Attempt    Call within 2 business days of discharge: Yes   Attempted to reach patient for transitions of care follow up. Unable to reach patient. Patient: Aubrie Whiting Patient : 1932 MRN: [id number]    Last Discharge [facility]       Complaint Diagnosis Description Type Department Provider    22 Hypotension Hypotension, unspecified hypotension type . .. ED to Hosp-Admission (Discharged) (ADMITTED) Henny Rene MD; Cj Delarosa. .. Was this an external facility discharge?  No Discharge Facility: N/A    Noted following upcoming appointments from discharge chart review:   [facility] follow up appointment(s):   Future Appointments   Date Time Provider Apurva Haskins   2022 10:15 AM Randy Overton MD [address] [facility] AMB   8/15/2022  1:00 PM Amey Snellen, APRN [facility] AMB   2022 11:00 AM Xavier Holt MD [facility] AMB     Non-[facility] follow up appointment(s):